# Patient Record
Sex: MALE | Race: WHITE | NOT HISPANIC OR LATINO | Employment: OTHER | URBAN - METROPOLITAN AREA
[De-identification: names, ages, dates, MRNs, and addresses within clinical notes are randomized per-mention and may not be internally consistent; named-entity substitution may affect disease eponyms.]

---

## 2018-04-03 ENCOUNTER — HOSPITAL ENCOUNTER (OUTPATIENT)
Dept: LAB | Facility: MEDICAL CENTER | Age: 78
End: 2018-04-03
Attending: OBSTETRICS & GYNECOLOGY
Payer: MEDICARE

## 2018-04-03 LAB
ALP SERPL-CCNC: 61 U/L (ref 30–99)
ALT SERPL-CCNC: 14 U/L (ref 2–50)
AST SERPL-CCNC: 16 U/L (ref 12–45)
VALPROATE SERPL-MCNC: 72.6 UG/ML (ref 50–100)

## 2018-04-03 PROCEDURE — 84075 ASSAY ALKALINE PHOSPHATASE: CPT

## 2018-04-03 PROCEDURE — 84460 ALANINE AMINO (ALT) (SGPT): CPT

## 2018-04-03 PROCEDURE — 84450 TRANSFERASE (AST) (SGOT): CPT

## 2018-04-03 PROCEDURE — 36415 COLL VENOUS BLD VENIPUNCTURE: CPT

## 2018-04-03 PROCEDURE — 80164 ASSAY DIPROPYLACETIC ACD TOT: CPT

## 2019-01-11 ENCOUNTER — APPOINTMENT (OUTPATIENT)
Dept: PHARMACY | Facility: CLINIC | Age: 79
End: 2019-01-11
Payer: SELF-PAY

## 2019-01-11 ENCOUNTER — APPOINTMENT (OUTPATIENT)
Dept: OTOLARYNGOLOGY | Facility: CLINIC | Age: 79
End: 2019-01-11
Payer: MEDICARE

## 2019-01-11 VITALS — HEIGHT: 70 IN | BODY MASS INDEX: 25.77 KG/M2 | WEIGHT: 180 LBS

## 2019-01-11 VITALS
TEMPERATURE: 97.2 F | HEART RATE: 60 BPM | SYSTOLIC BLOOD PRESSURE: 105 MMHG | DIASTOLIC BLOOD PRESSURE: 61 MMHG | OXYGEN SATURATION: 99 % | RESPIRATION RATE: 20 BRPM

## 2019-01-11 DIAGNOSIS — Z82.2 FAMILY HISTORY OF DEAFNESS AND HEARING LOSS: ICD-10-CM

## 2019-01-11 DIAGNOSIS — H90.42 SENSORINEURAL HEARING LOSS, UNILATERAL, LEFT EAR, WITH UNRESTRICTED HEARING ON THE CONTRALATERAL SIDE: ICD-10-CM

## 2019-01-11 DIAGNOSIS — Z87.891 PERSONAL HISTORY OF NICOTINE DEPENDENCE: ICD-10-CM

## 2019-01-11 DIAGNOSIS — Z86.69 PERSONAL HISTORY OF OTHER DISEASES OF THE NERVOUS SYSTEM AND SENSE ORGANS: ICD-10-CM

## 2019-01-11 DIAGNOSIS — H93.90 UNSPECIFIED DISORDER OF EAR, UNSPECIFIED EAR: ICD-10-CM

## 2019-01-11 PROCEDURE — 92550 TYMPANOMETRY & REFLEX THRESH: CPT

## 2019-01-11 PROCEDURE — 92571 FILTERED SPEECH TEST: CPT

## 2019-01-11 PROCEDURE — 99203 OFFICE O/P NEW LOW 30 MIN: CPT

## 2019-01-11 PROCEDURE — V5299A: CUSTOM | Mod: NC

## 2019-01-11 PROCEDURE — 92557 COMPREHENSIVE HEARING TEST: CPT

## 2019-01-11 RX ORDER — DIVALPROEX SODIUM 125 MG/1
CAPSULE, COATED PELLETS ORAL
Refills: 0 | Status: ACTIVE | COMMUNITY

## 2019-01-11 RX ORDER — FINASTERIDE 5 MG/1
5 TABLET, FILM COATED ORAL
Refills: 0 | Status: ACTIVE | COMMUNITY

## 2019-01-11 RX ORDER — SILODOSIN 8 MG/1
8 CAPSULE ORAL
Refills: 0 | Status: ACTIVE | COMMUNITY

## 2019-01-11 NOTE — HISTORY OF PRESENT ILLNESS
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 He stated that his wife has requested to see whether amplification or something else will improve his hearing as he has had progressive difficulty. he feels his hl is severe. He denies tinnitus or vertigo. he denies noise exposure.

## 2019-02-08 ENCOUNTER — HOSPITAL ENCOUNTER (OUTPATIENT)
Dept: LAB | Facility: MEDICAL CENTER | Age: 79
End: 2019-02-08
Attending: PSYCHIATRY & NEUROLOGY
Payer: MEDICARE

## 2019-02-08 LAB
ALP SERPL-CCNC: 61 U/L (ref 30–99)
ALT SERPL-CCNC: 14 U/L (ref 2–50)
AST SERPL-CCNC: 16 U/L (ref 12–45)
PSA SERPL-MCNC: 6 NG/ML (ref 0–4)
VALPROATE SERPL-MCNC: 92.9 UG/ML (ref 50–100)

## 2019-02-08 PROCEDURE — 84153 ASSAY OF PSA TOTAL: CPT | Mod: GA

## 2019-02-08 PROCEDURE — 84075 ASSAY ALKALINE PHOSPHATASE: CPT

## 2019-02-08 PROCEDURE — 80164 ASSAY DIPROPYLACETIC ACD TOT: CPT

## 2019-02-08 PROCEDURE — 84460 ALANINE AMINO (ALT) (SGPT): CPT

## 2019-02-08 PROCEDURE — 36415 COLL VENOUS BLD VENIPUNCTURE: CPT | Mod: GA

## 2019-02-08 PROCEDURE — 84450 TRANSFERASE (AST) (SGOT): CPT

## 2019-08-19 ENCOUNTER — HOSPITAL ENCOUNTER (EMERGENCY)
Facility: MEDICAL CENTER | Age: 79
End: 2019-08-19
Attending: EMERGENCY MEDICINE
Payer: MEDICARE

## 2019-08-19 VITALS
TEMPERATURE: 98.1 F | DIASTOLIC BLOOD PRESSURE: 74 MMHG | OXYGEN SATURATION: 95 % | HEART RATE: 74 BPM | BODY MASS INDEX: 25.75 KG/M2 | SYSTOLIC BLOOD PRESSURE: 149 MMHG | WEIGHT: 179.9 LBS | HEIGHT: 70 IN | RESPIRATION RATE: 16 BRPM

## 2019-08-19 DIAGNOSIS — R11.2 NAUSEA AND VOMITING, INTRACTABILITY OF VOMITING NOT SPECIFIED, UNSPECIFIED VOMITING TYPE: ICD-10-CM

## 2019-08-19 DIAGNOSIS — R19.7 DIARRHEA, UNSPECIFIED TYPE: ICD-10-CM

## 2019-08-19 LAB
ALBUMIN SERPL BCP-MCNC: 4.4 G/DL (ref 3.2–4.9)
ALBUMIN/GLOB SERPL: 1.8 G/DL
ALP SERPL-CCNC: 59 U/L (ref 30–99)
ALT SERPL-CCNC: 10 U/L (ref 2–50)
ANION GAP SERPL CALC-SCNC: 13 MMOL/L (ref 0–11.9)
AST SERPL-CCNC: 16 U/L (ref 12–45)
BASOPHILS # BLD AUTO: 0.1 % (ref 0–1.8)
BASOPHILS # BLD: 0.01 K/UL (ref 0–0.12)
BILIRUB SERPL-MCNC: 0.7 MG/DL (ref 0.1–1.5)
BUN SERPL-MCNC: 13 MG/DL (ref 8–22)
CALCIUM SERPL-MCNC: 9.1 MG/DL (ref 8.4–10.2)
CHLORIDE SERPL-SCNC: 98 MMOL/L (ref 96–112)
CO2 SERPL-SCNC: 22 MMOL/L (ref 20–33)
CREAT SERPL-MCNC: 1.01 MG/DL (ref 0.5–1.4)
EOSINOPHIL # BLD AUTO: 0 K/UL (ref 0–0.51)
EOSINOPHIL NFR BLD: 0 % (ref 0–6.9)
ERYTHROCYTE [DISTWIDTH] IN BLOOD BY AUTOMATED COUNT: 41.2 FL (ref 35.9–50)
GLOBULIN SER CALC-MCNC: 2.5 G/DL (ref 1.9–3.5)
GLUCOSE SERPL-MCNC: 102 MG/DL (ref 65–99)
HCT VFR BLD AUTO: 42.1 % (ref 42–52)
HGB BLD-MCNC: 14.6 G/DL (ref 14–18)
IMM GRANULOCYTES # BLD AUTO: 0.03 K/UL (ref 0–0.11)
IMM GRANULOCYTES NFR BLD AUTO: 0.4 % (ref 0–0.9)
LACTATE BLD-SCNC: 2.2 MMOL/L (ref 0.5–2)
LIPASE SERPL-CCNC: 21 U/L (ref 7–58)
LYMPHOCYTES # BLD AUTO: 0.34 K/UL (ref 1–4.8)
LYMPHOCYTES NFR BLD: 5 % (ref 22–41)
MCH RBC QN AUTO: 31.3 PG (ref 27–33)
MCHC RBC AUTO-ENTMCNC: 34.7 G/DL (ref 33.7–35.3)
MCV RBC AUTO: 90.1 FL (ref 81.4–97.8)
MONOCYTES # BLD AUTO: 0.88 K/UL (ref 0–0.85)
MONOCYTES NFR BLD AUTO: 12.9 % (ref 0–13.4)
NEUTROPHILS # BLD AUTO: 5.55 K/UL (ref 1.82–7.42)
NEUTROPHILS NFR BLD: 81.6 % (ref 44–72)
NRBC # BLD AUTO: 0 K/UL
NRBC BLD-RTO: 0 /100 WBC
PLATELET # BLD AUTO: 247 K/UL (ref 164–446)
PMV BLD AUTO: 9.9 FL (ref 9–12.9)
POTASSIUM SERPL-SCNC: 3.8 MMOL/L (ref 3.6–5.5)
PROT SERPL-MCNC: 6.9 G/DL (ref 6–8.2)
RBC # BLD AUTO: 4.67 M/UL (ref 4.7–6.1)
SODIUM SERPL-SCNC: 133 MMOL/L (ref 135–145)
WBC # BLD AUTO: 6.8 K/UL (ref 4.8–10.8)

## 2019-08-19 PROCEDURE — 80053 COMPREHEN METABOLIC PANEL: CPT

## 2019-08-19 PROCEDURE — 83690 ASSAY OF LIPASE: CPT

## 2019-08-19 PROCEDURE — 99284 EMERGENCY DEPT VISIT MOD MDM: CPT

## 2019-08-19 PROCEDURE — 700105 HCHG RX REV CODE 258: Performed by: EMERGENCY MEDICINE

## 2019-08-19 PROCEDURE — 36415 COLL VENOUS BLD VENIPUNCTURE: CPT

## 2019-08-19 PROCEDURE — 83605 ASSAY OF LACTIC ACID: CPT

## 2019-08-19 PROCEDURE — 85025 COMPLETE CBC W/AUTO DIFF WBC: CPT

## 2019-08-19 RX ORDER — DIVALPROEX SODIUM 500 MG/1
1000 TABLET, EXTENDED RELEASE ORAL EVERY MORNING
Status: SHIPPED | COMMUNITY
End: 2021-04-30

## 2019-08-19 RX ORDER — ONDANSETRON 4 MG/1
4 TABLET, ORALLY DISINTEGRATING ORAL EVERY 8 HOURS PRN
Qty: 20 TAB | Refills: 0 | Status: SHIPPED | OUTPATIENT
Start: 2019-08-19 | End: 2019-11-07

## 2019-08-19 RX ORDER — SODIUM CHLORIDE 9 MG/ML
1000 INJECTION, SOLUTION INTRAVENOUS ONCE
Status: COMPLETED | OUTPATIENT
Start: 2019-08-19 | End: 2019-08-19

## 2019-08-19 RX ADMIN — SODIUM CHLORIDE 1000 ML: 9 INJECTION, SOLUTION INTRAVENOUS at 16:42

## 2019-08-19 ASSESSMENT — LIFESTYLE VARIABLES: DO YOU DRINK ALCOHOL: NO

## 2019-08-19 NOTE — ED NOTES
1518:  Assisted w/ pt care.  PIV placed in RAC, blood drawn and sent to lab.  Pt repositioned on gurney, provided w/ warm blankets.  Pt denies c/o pain at this time.

## 2019-08-19 NOTE — ED TRIAGE NOTES
Pt ambulates to triage  Chief Complaint   Patient presents with   • Diarrhea   • N/V   • Abdominal Pain   started yesterday   Pt also c/o dizziness  X 10 episodes of diarrhea, denies black or red in emesis and stool  Pt A & 0 x 4, speech clear, ambulates well  Pt asked to wait in lobby, pt updated on triage process and pt asked to inform RN of any changes.

## 2019-08-19 NOTE — ED PROVIDER NOTES
"ED Provider Note    CHIEF COMPLAINT  Chief Complaint   Patient presents with   • Diarrhea   • N/V   • Abdominal Pain       HPI  Laurent Infante is a 79 y.o. male who presents with a history of vomiting and diarrhea since 1 AM.  He describes some crampy abdominal pain.  The patient says that he lives with his wife and children and none of them have the same symptoms.  Patient also describes some dizziness.  The patient has had no travel out of the [place]. The patient has not been camping drinking out of streams. The patient has had no recent antibiotic use. The patient denies any blood or black in their vomit or stool.      REVIEW OF SYSTEMS  See HPI for further details. All other systems are negative.     PAST MEDICAL HISTORY   The patient denies    SOCIAL HISTORY  Social History     Tobacco Use   • Smoking status: Never Smoker   • Smokeless tobacco: Never Used   Substance and Sexual Activity   • Alcohol use: Yes   • Drug use: Never   • Sexual activity: Not on file       SURGICAL HISTORY   has a past surgical history that includes shoulder surgery and hernia repair.    CURRENT MEDICATIONS  Home Medications     Reviewed by Chris Rouse (Pharmacy Tech) on 08/19/19 at 1611  Med List Status: Complete   Medication Last Dose Status   divalproex ER (DEPAKOTE ER) 500 MG TABLET SR 24 HR 8/19/2019 Active                ALLERGIES  No Known Allergies    PHYSICAL EXAM  VITAL SIGNS: /69   Pulse 70   Temp 36.7 °C (98.1 °F) (Temporal)   Resp 16   Ht 1.778 m (5' 10\")   Wt 81.6 kg (179 lb 14.3 oz)   SpO2 99%   BMI 25.81 kg/m²  @MOHAMUD[[id number]::@   Pulse ox interpretation: I interpret this pulse ox as normal.  Constitutional: Alert, appears dehydrated with dry mucous membranes.  The patient appears generally weak.  HENT: No signs of trauma, Bilateral external ears normal, Nose normal.   Eyes: Pupils are equal and reactive, Conjunctiva normal, Non-icteric.   Neck: Normal range of motion, No tenderness, Supple, " No stridor.   Lymphatic: No lymphadenopathy noted.   Cardiovascular: Regular rate and rhythm, no murmurs.   Thorax & Lungs: Normal breath sounds, No respiratory distress, No wheezing, No chest tenderness.   Abdomen: Bowel sounds normal, Soft, No tenderness, No masses, No pulsatile masses. No peritoneal signs.  Skin: Warm, Dry, No erythema, No rash.   Extremities: Intact distal pulses, No edema, No tenderness, No cyanosis.  Musculoskeletal: Good range of motion in all major joints. No tenderness to palpation or major deformities noted.   Neurologic: Alert , Normal motor function, Normal sensory function, No focal deficits noted.   Psychiatric: Affect normal, Judgment normal, Mood normal.       DIAGNOSTIC STUDIES / PROCEDURES        LABS  Labs Reviewed   CBC WITH DIFFERENTIAL - Abnormal; Notable for the following components:       Result Value    RBC 4.67 (*)     Neutrophils-Polys 81.60 (*)     Lymphocytes 5.00 (*)     Lymphs (Absolute) 0.34 (*)     Monos (Absolute) 0.88 (*)     All other components within normal limits   COMP METABOLIC PANEL - Abnormal; Notable for the following components:    Sodium 133 (*)     Anion Gap 13.0 (*)     Glucose 102 (*)     All other components within normal limits   LACTIC ACID - Abnormal; Notable for the following components:    Lactic Acid 2.2 (*)     All other components within normal limits   LIPASE   ESTIMATED GFR   URINALYSIS,CULTURE IF INDICATED               COURSE & MEDICAL DECISION MAKING  Pertinent Labs & Imaging studies reviewed. (See chart for details)    Differential diagnosis: Dehydration, gastroenteritis    The patient clinically appears dehydrated and therefore was given 1 L normal saline IV.    Patient was checked after 1 L normal saline IV and he feels much better and he appears improved.    The patient be discharged with a prescription for Zofran, he will take Imodium over-the-counter as needed for diarrhea.    The patient will return for new or worsening symptoms  and is stable at the time of discharge.    The patient is referred to a primary physician for blood pressure management, diabetic screening, and for all other preventative health concerns.        DISPOSITION:  Patient will be discharged home in stable condition.    FOLLOW UP:  [facility], Emergency Dept  [address]  [place] [address]  [phone]    If symptoms worsen          see your doctor for follow up as needed      OUTPATIENT MEDICATIONS:  New Prescriptions    ONDANSETRON (ZOFRAN ODT) 4 MG TABLET DISPERSIBLE    Take 1 Tab by mouth every 8 hours as needed.         The patient will not drink alcohol nor drive with prescribed medications. The patient will return for worsening symptoms and is stable at the time of discharge. The patient verbalizes understanding and will comply.    FINAL IMPRESSION  1. Nausea and vomiting, intractability of vomiting not specified, unspecified vomiting type     2. Diarrhea, unspecified type                Electronically signed by: Sascha Gregory, 8/19/2019 4:29 PM

## 2019-08-20 ENCOUNTER — HOSPITAL ENCOUNTER (EMERGENCY)
Facility: MEDICAL CENTER | Age: 79
End: 2019-08-20
Payer: MEDICARE

## 2019-08-20 VITALS
DIASTOLIC BLOOD PRESSURE: 69 MMHG | SYSTOLIC BLOOD PRESSURE: 128 MMHG | HEART RATE: 74 BPM | OXYGEN SATURATION: 97 % | RESPIRATION RATE: 18 BRPM | BODY MASS INDEX: 25.75 KG/M2 | TEMPERATURE: 97.8 F | HEIGHT: 70 IN | WEIGHT: 179.9 LBS

## 2019-08-20 PROCEDURE — 302449 STATCHG TRIAGE ONLY (STATISTIC)

## 2019-08-20 NOTE — DISCHARGE INSTRUCTIONS
Food Choices to Help Relieve Diarrhea, Adult  When you have diarrhea, the foods you eat and your eating habits are very important. Choosing the right foods and drinks can help relieve diarrhea. Also, because diarrhea can last up to 7 days, you need to replace lost fluids and electrolytes (such as sodium, potassium, and chloride) in order to help prevent dehydration.   WHAT GENERAL GUIDELINES DO I NEED TO FOLLOW?  · Slowly drink 1 cup (8 oz) of fluid for each episode of diarrhea. If you are getting enough fluid, your urine will be clear or pale yellow.  · Eat starchy foods. Some good choices include white rice, white toast, pasta, low-fiber cereal, baked potatoes (without the skin), saltine crackers, and bagels.  · Avoid large servings of any cooked vegetables.  · Limit fruit to two servings per day. A serving is ½ cup or 1 small piece.  · Choose foods with less than 2 g of fiber per serving.  · Limit fats to less than 8 tsp (38 g) per day.  · Avoid fried foods.  · Eat foods that have probiotics in them. Probiotics can be found in certain dairy products.  · Avoid foods and beverages that may increase the speed at which food moves through the stomach and intestines (gastrointestinal tract). Things to avoid include:  ¨ High-fiber foods, such as dried fruit, raw fruits and vegetables, nuts, seeds, and whole grain foods.  ¨ Spicy foods and high-fat foods.  ¨ Foods and beverages sweetened with high-fructose corn syrup, honey, or sugar alcohols such as xylitol, sorbitol, and mannitol.  WHAT FOODS ARE RECOMMENDED?  Grains  White rice. White, Bulgarian, or nakul breads (fresh or toasted), including plain rolls, buns, or bagels. White pasta. Saltine, soda, or adrian crackers. Pretzels. Low-fiber cereal. Cooked cereals made with water (such as cornmeal, farina, or cream cereals). Plain muffins. Matzo. [place]axa toast. Zwieback.   Vegetables  Potatoes (without the skin). Strained tomato and vegetable juices. Most well-cooked and canned  vegetables without seeds. Tender lettuce.  Fruits  Cooked or canned applesauce, apricots, cherries, fruit cocktail, grapefruit, peaches, pears, or plums. Fresh bananas, apples without skin, cherries, grapes, cantaloupe, grapefruit, peaches, oranges, or plums.   Meat and Other Protein Products  Baked or boiled chicken. Eggs. Tofu. Fish. Seafood. Smooth peanut butter. Ground or well-cooked tender beef, ham, veal, lamb, pork, or poultry.   Dairy  Plain yogurt, kefir, and unsweetened liquid yogurt. Lactose-free milk, buttermilk, or soy milk. Plain hard cheese.  Beverages  Sport drinks. Clear broths. Diluted fruit juices (except prune). Regular, caffeine-free sodas such as ginger ale. Water. Decaffeinated teas. Oral rehydration solutions. Sugar-free beverages not sweetened with sugar alcohols.  Other  Bouillon, broth, or soups made from recommended foods.   The items listed above may not be a complete list of recommended foods or beverages. Contact your dietitian for more options.  WHAT FOODS ARE NOT RECOMMENDED?  Grains  Whole grain, whole wheat, bran, or rye breads, rolls, pastas, crackers, and cereals. Wild or brown rice. Cereals that contain more than 2 g of fiber per serving. Corn tortillas or taco shells. Cooked or dry oatmeal. Granola. Popcorn.  Vegetables  Raw vegetables. Cabbage, broccoli, [place] sprouts, artichokes, baked beans, beet greens, corn, kale, legumes, peas, sweet potatoes, and yams. Potato skins. Cooked spinach and cabbage.  Fruits  Dried fruit, including raisins and dates. Raw fruits. Stewed or dried prunes. Fresh apples with skin, apricots, mangoes, pears, raspberries, and strawberries.   Meat and Other Protein Products  [place] peanut butter. Nuts and seeds. Beans and lentils. Field.   Dairy  High-fat cheeses. Milk, chocolate milk, and beverages made with milk, such as milk shakes. Cream. Ice cream.  Sweets and Desserts  Sweet rolls, doughnuts, and sweet breads. Pancakes and waffles.  Fats and  Oils  Butter. Cream sauces. Margarine. Salad oils. Plain salad dressings. Olives. Avocados.   Beverages  Caffeinated beverages (such as coffee, tea, soda, or energy drinks). Alcoholic beverages. Fruit juices with pulp. Prune juice. Soft drinks sweetened with high-fructose corn syrup or sugar alcohols.  Other  Coconut. Hot sauce. Chili powder. Mayonnaise. Gravy. Cream-based or milk-based soups.   The items listed above may not be a complete list of foods and beverages to avoid. Contact your dietitian for more information.  WHAT SHOULD I DO IF I BECOME DEHYDRATED?  Diarrhea can sometimes lead to dehydration. Signs of dehydration include dark urine and dry mouth and skin. If you think you are dehydrated, you should rehydrate with an oral rehydration solution. These solutions can be purchased at pharmacies, retail stores, or online.   Drink ½-1 cup (120-240 mL) of oral rehydration solution each time you have an episode of diarrhea. If drinking this amount makes your diarrhea worse, try drinking smaller amounts more often. For example, drink 1-3 tsp (5-15 mL) every 5-10 minutes.   A general rule for staying hydrated is to drink 1½-2 L of fluid per day. Talk to your health care provider about the specific amount you should be drinking each day. Drink enough fluids to keep your urine clear or pale yellow.     This information is not intended to replace advice given to you by your health care provider. Make sure you discuss any questions you have with your health care provider.     Document Released: 03/09/2005 Document Revised: 01/08/2016 Document Reviewed: 11/10/2014  [employer] Interactive Patient Education ©2016 [employer]

## 2019-08-20 NOTE — ED NOTES
Patient verbalized understanding of plan of care and discharge information. Patient in stable condition. No signs of distress. Patient pain free. Patient ambulatory out of ED to personal vehicle with stable gait with family.

## 2019-08-21 NOTE — PROGRESS NOTES
Family came to triage and informed my that she went to the store and picked him up immodium and they were going to [facility]. They would return if his symptoms didn't improve. I explained the risks and gave him strict return instructions. He left in good condition in the care of his friend.

## 2019-08-21 NOTE — ED TRIAGE NOTES
Patient c/o diarrhea and abdominal cramping. Patient was seen here yesterday for the same. He was supposed to take zofran and immodium, but wasn't able to take immodium.

## 2019-11-07 ENCOUNTER — OFFICE VISIT (OUTPATIENT)
Dept: MEDICAL GROUP | Facility: MEDICAL CENTER | Age: 79
End: 2019-11-07
Payer: MEDICARE

## 2019-11-07 VITALS
BODY MASS INDEX: 25.88 KG/M2 | HEART RATE: 64 BPM | OXYGEN SATURATION: 96 % | WEIGHT: 180.78 LBS | HEIGHT: 70 IN | DIASTOLIC BLOOD PRESSURE: 62 MMHG | TEMPERATURE: 97.9 F | SYSTOLIC BLOOD PRESSURE: 124 MMHG

## 2019-11-07 DIAGNOSIS — Z23 NEED FOR VACCINATION: ICD-10-CM

## 2019-11-07 DIAGNOSIS — Z13.220 LIPID SCREENING: ICD-10-CM

## 2019-11-07 DIAGNOSIS — F31.62 BIPOLAR DISORDER, CURRENT EPISODE MIXED, MODERATE (HCC): ICD-10-CM

## 2019-11-07 DIAGNOSIS — R05.9 COUGH: ICD-10-CM

## 2019-11-07 DIAGNOSIS — R25.2 LEG CRAMPS: ICD-10-CM

## 2019-11-07 PROBLEM — F33.0 MILD EPISODE OF RECURRENT MAJOR DEPRESSIVE DISORDER (HCC): Status: ACTIVE | Noted: 2019-11-07

## 2019-11-07 PROCEDURE — G0008 ADMIN INFLUENZA VIRUS VAC: HCPCS | Performed by: FAMILY MEDICINE

## 2019-11-07 PROCEDURE — 90670 PCV13 VACCINE IM: CPT | Performed by: FAMILY MEDICINE

## 2019-11-07 PROCEDURE — 99204 OFFICE O/P NEW MOD 45 MIN: CPT | Mod: 25 | Performed by: FAMILY MEDICINE

## 2019-11-07 PROCEDURE — 90662 IIV NO PRSV INCREASED AG IM: CPT | Performed by: FAMILY MEDICINE

## 2019-11-07 PROCEDURE — G0009 ADMIN PNEUMOCOCCAL VACCINE: HCPCS | Performed by: FAMILY MEDICINE

## 2019-11-07 RX ORDER — FLUTICASONE PROPIONATE 50 MCG
1 SPRAY, SUSPENSION (ML) NASAL DAILY
Qty: 16 G | Refills: 2 | Status: SHIPPED | OUTPATIENT
Start: 2019-11-07 | End: 2019-12-04 | Stop reason: SDUPTHER

## 2019-11-07 ASSESSMENT — PATIENT HEALTH QUESTIONNAIRE - PHQ9
CLINICAL INTERPRETATION OF PHQ2 SCORE: 6
SUM OF ALL RESPONSES TO PHQ QUESTIONS 1-9: 14
5. POOR APPETITE OR OVEREATING: 0 - NOT AT ALL

## 2019-11-07 NOTE — ASSESSMENT & PLAN NOTE
Has been on depakote for the past 20 yrs. He has tried to stop the medication in the past with very poor   Mood is generally stable, he states his mood has been poorer   He is not intersted in trying another antidepressants.

## 2019-11-07 NOTE — PROGRESS NOTES
Subjective:     CC: Diagnoses of Bipolar disorder, current episode mixed, moderate (HCC), Leg cramps, Cough, Lipid screening, and Need for vaccination were pertinent to this visit.    HPI: Patient is a 79 y.o. male established patient who presents today to [facility] care.       Bipolar disorder, current episode mixed, moderate (HCC)  Chronic problem. Has been on depakote for the past 20 yrs. He has tried to stop the medication in the past with very poor results.  Mood is generally stable, however, he states his mood has been poorer lately due to financial problems.  He is not intersted in trying another antidepressant.  He also states he is not interested in seeing a therapist.  He does report suicidal ideation, however, states he would never follow through with this due to his wife and dogs.    Cough  New problem. Has had a chronic cough for the past year or so. The cough is worst at night.   Denies any SOB.   The cough is productive, coughs up phlegm.   No fevers or chills.   Denies any symptoms of reflux.   Does report some sinus congestion.   Has not tried any OTC allergy meds.     Leg cramps  New problem. Having cramps in the calves and thighs while sleeping. He took pedialyte after a GI illness which seems to have improved the cramping.       History reviewed. No pertinent past medical history.    Social History     Tobacco Use   • Smoking status: Never Smoker   • Smokeless tobacco: Never Used   Substance Use Topics   • Alcohol use: Yes     Comment: occasionally   • Drug use: Never       Current Outpatient Medications Ordered in Epic   Medication Sig Dispense Refill   • fluticasone (FLONASE) 50 MCG/ACT nasal spray Spray 1 Spray in nose every day. 16 g 2   • divalproex ER (DEPAKOTE ER) 500 MG TABLET SR 24 HR Take 1,000 mg by mouth every morning.       No current [facility]-ordered facility-administered medications on file.        Allergies:  Patient has no known allergies.    Health Maintenance:  "Completed    ROS:  Gen: no fevers/chill, no changes in weight  Eyes: no changes in vision  ENT: no sore throat, no hearing loss, no bloody nose  Pulm: no sob, no cough  CV: no chest pain, no palpitations  GI: no nausea/vomiting, no diarrhea  : no dysuria  MSk: no myalgias  Skin: no rash  Neuro: no headaches, no numbness/tingling  Heme/Lymph: no easy bruising      Objective:       Exam:  /62 (BP Location: Left arm, Patient Position: Sitting, BP Cuff Size: Adult)   Pulse 64   Temp 36.6 °C (97.9 °F) (Temporal)   Ht 1.778 m (5' 10\")   Wt 82 kg (180 lb 12.4 oz)   SpO2 96%   BMI 25.94 kg/m²  Body mass index is 25.94 kg/m².      General: Normal appearing. No distress.  HEAD: NCAT  EYES: conjunctiva clear, lids without ptosis, pupils equal  and reactive to light  EARS: ears normal shape and contour, bilateral hearing aids in place.  MOUTH: oropharynx is without erythema, edema or exudates.   Neck: Supple without masses. Thyroid is not enlarged. Normal ROM  Pulmonary: Clear to ausculation.  Normal effort. No rales, ronchi, or wheezing.  Cardiovascular: Regular rate and rhythm, no murmur.  Bilateral 1+ LE edema  Neurologic: Grossly normal, no focal deficits  Lymph: No cervical or supraclavicular lymph nodes are palpable  Skin: Warm and dry.  No obvious lesions.  Musculoskeletal: Normal gait and station.   Psych: Normal mood and affect. Alert and oriented x3. Judgment and insight is normal.      Assessment & Plan:     79 y.o. male with the following -     1. Bipolar disorder, current episode mixed, moderate (HCC)  Chronic problem, new to discuss.  Patient does report more depression than usual with some suicidal ideation, however, states he would never follow through with this.  Currently on Depakote, declines referral to psychiatry or psychology.  Declines the addition of any further medications.  I did order the following labs.  Plan to follow-up in 1 month.  - TSH WITH REFLEX TO FT4; Future  - CBC WITH " DIFFERENTIAL; Future    2. Leg cramps  New problem.  Ordered the following labs.  Cramping has improved since drinking Pedialyte.  - Comp Metabolic Panel; Future    3. Cough  New problem.  Most likely due to postnasal drainage.  Prescription given for Flonase, order placed for chest x-ray.  Plan to follow-up in 1 month.  - fluticasone (FLONASE) 50 MCG/ACT nasal spray; Spray 1 Spray in nose every day.  Dispense: 16 g; Refill: 2  - DX-CHEST-2 VIEWS; Future    4. Lipid screening  - Lipid Profile; Future    5. Need for vaccination  - Influenza Vaccine, High Dose (65+ Only)  - Prevnar-13 Vaccine (PCV13)      Return in about 4 weeks (around 12/5/2019).    Please note that this dictation was created using voice recognition software. I have made every reasonable attempt to correct obvious errors, but I expect that there are errors of grammar and possibly content that I did not discover before finalizing the note.

## 2019-11-07 NOTE — ASSESSMENT & PLAN NOTE
New problem. Has had a chronic cough for the past year or so. The cough is worst at night.   Denies any SOB.   The cough is productive, coughs up phlegm.   No fevers or chills.   Denies any symptoms of reflux.   Does report some sinus congestion.   Has not tried any OTC allergy meds.

## 2019-11-07 NOTE — ASSESSMENT & PLAN NOTE
New problem. Having cramps in the calves and thighs while sleeping. He took pedialyte after a GI illness which seems to have improved the cramping.

## 2019-11-11 ENCOUNTER — HOSPITAL ENCOUNTER (OUTPATIENT)
Dept: RADIOLOGY | Facility: MEDICAL CENTER | Age: 79
End: 2019-11-11
Attending: FAMILY MEDICINE
Payer: MEDICARE

## 2019-11-11 ENCOUNTER — HOSPITAL ENCOUNTER (OUTPATIENT)
Dept: LAB | Facility: MEDICAL CENTER | Age: 79
End: 2019-11-11
Attending: FAMILY MEDICINE
Payer: MEDICARE

## 2019-11-11 DIAGNOSIS — F31.62 BIPOLAR DISORDER, CURRENT EPISODE MIXED, MODERATE (HCC): ICD-10-CM

## 2019-11-11 DIAGNOSIS — Z13.220 LIPID SCREENING: ICD-10-CM

## 2019-11-11 DIAGNOSIS — R05.9 COUGH: ICD-10-CM

## 2019-11-11 DIAGNOSIS — R25.2 LEG CRAMPS: ICD-10-CM

## 2019-11-11 LAB
ALBUMIN SERPL BCP-MCNC: 3.9 G/DL (ref 3.2–4.9)
ALBUMIN/GLOB SERPL: 1.7 G/DL
ALP SERPL-CCNC: 50 U/L (ref 30–99)
ALT SERPL-CCNC: 12 U/L (ref 2–50)
ANION GAP SERPL CALC-SCNC: 7 MMOL/L (ref 0–11.9)
AST SERPL-CCNC: 13 U/L (ref 12–45)
BASOPHILS # BLD AUTO: 0.4 % (ref 0–1.8)
BASOPHILS # BLD: 0.02 K/UL (ref 0–0.12)
BILIRUB SERPL-MCNC: 0.5 MG/DL (ref 0.1–1.5)
BUN SERPL-MCNC: 17 MG/DL (ref 8–22)
CALCIUM SERPL-MCNC: 9 MG/DL (ref 8.5–10.5)
CHLORIDE SERPL-SCNC: 107 MMOL/L (ref 96–112)
CHOLEST SERPL-MCNC: 161 MG/DL (ref 100–199)
CO2 SERPL-SCNC: 26 MMOL/L (ref 20–33)
CREAT SERPL-MCNC: 0.9 MG/DL (ref 0.5–1.4)
EOSINOPHIL # BLD AUTO: 0.06 K/UL (ref 0–0.51)
EOSINOPHIL NFR BLD: 1.1 % (ref 0–6.9)
ERYTHROCYTE [DISTWIDTH] IN BLOOD BY AUTOMATED COUNT: 42.4 FL (ref 35.9–50)
FASTING STATUS PATIENT QL REPORTED: NORMAL
GLOBULIN SER CALC-MCNC: 2.3 G/DL (ref 1.9–3.5)
GLUCOSE SERPL-MCNC: 89 MG/DL (ref 65–99)
HCT VFR BLD AUTO: 41.1 % (ref 42–52)
HDLC SERPL-MCNC: 57 MG/DL
HGB BLD-MCNC: 13.6 G/DL (ref 14–18)
IMM GRANULOCYTES # BLD AUTO: 0.01 K/UL (ref 0–0.11)
IMM GRANULOCYTES NFR BLD AUTO: 0.2 % (ref 0–0.9)
LDLC SERPL CALC-MCNC: 91 MG/DL
LYMPHOCYTES # BLD AUTO: 1.67 K/UL (ref 1–4.8)
LYMPHOCYTES NFR BLD: 30.1 % (ref 22–41)
MCH RBC QN AUTO: 30.9 PG (ref 27–33)
MCHC RBC AUTO-ENTMCNC: 33.1 G/DL (ref 33.7–35.3)
MCV RBC AUTO: 93.4 FL (ref 81.4–97.8)
MONOCYTES # BLD AUTO: 0.83 K/UL (ref 0–0.85)
MONOCYTES NFR BLD AUTO: 15 % (ref 0–13.4)
NEUTROPHILS # BLD AUTO: 2.95 K/UL (ref 1.82–7.42)
NEUTROPHILS NFR BLD: 53.2 % (ref 44–72)
NRBC # BLD AUTO: 0 K/UL
NRBC BLD-RTO: 0 /100 WBC
PLATELET # BLD AUTO: 261 K/UL (ref 164–446)
PMV BLD AUTO: 9.5 FL (ref 9–12.9)
POTASSIUM SERPL-SCNC: 4 MMOL/L (ref 3.6–5.5)
PROT SERPL-MCNC: 6.2 G/DL (ref 6–8.2)
RBC # BLD AUTO: 4.4 M/UL (ref 4.7–6.1)
SODIUM SERPL-SCNC: 140 MMOL/L (ref 135–145)
TRIGL SERPL-MCNC: 65 MG/DL (ref 0–149)
WBC # BLD AUTO: 5.5 K/UL (ref 4.8–10.8)

## 2019-11-11 PROCEDURE — 71046 X-RAY EXAM CHEST 2 VIEWS: CPT

## 2019-11-11 PROCEDURE — 84443 ASSAY THYROID STIM HORMONE: CPT

## 2019-11-11 PROCEDURE — 36415 COLL VENOUS BLD VENIPUNCTURE: CPT

## 2019-11-11 PROCEDURE — 80061 LIPID PANEL: CPT | Mod: GA

## 2019-11-11 PROCEDURE — 80053 COMPREHEN METABOLIC PANEL: CPT

## 2019-11-11 PROCEDURE — 85025 COMPLETE CBC W/AUTO DIFF WBC: CPT

## 2019-11-12 LAB — TSH SERPL DL<=0.005 MIU/L-ACNC: 2.75 UIU/ML (ref 0.38–5.33)

## 2019-12-04 DIAGNOSIS — R05.9 COUGH: ICD-10-CM

## 2019-12-05 RX ORDER — FLUTICASONE PROPIONATE 50 MCG
1 SPRAY, SUSPENSION (ML) NASAL DAILY
Qty: 1 BOTTLE | Refills: 2 | Status: SHIPPED | OUTPATIENT
Start: 2019-12-05 | End: 2020-03-03

## 2019-12-09 ENCOUNTER — HOSPITAL ENCOUNTER (OUTPATIENT)
Dept: LAB | Facility: MEDICAL CENTER | Age: 79
End: 2019-12-09
Attending: FAMILY MEDICINE
Payer: MEDICARE

## 2019-12-09 ENCOUNTER — OFFICE VISIT (OUTPATIENT)
Dept: MEDICAL GROUP | Facility: MEDICAL CENTER | Age: 79
End: 2019-12-09
Payer: MEDICARE

## 2019-12-09 VITALS
BODY MASS INDEX: 26.69 KG/M2 | SYSTOLIC BLOOD PRESSURE: 128 MMHG | TEMPERATURE: 97.6 F | DIASTOLIC BLOOD PRESSURE: 68 MMHG | HEART RATE: 60 BPM | HEIGHT: 70 IN | WEIGHT: 186.4 LBS | OXYGEN SATURATION: 100 %

## 2019-12-09 DIAGNOSIS — R05.9 COUGH: ICD-10-CM

## 2019-12-09 DIAGNOSIS — F31.62 BIPOLAR DISORDER, CURRENT EPISODE MIXED, MODERATE (HCC): ICD-10-CM

## 2019-12-09 DIAGNOSIS — D64.9 ANEMIA, UNSPECIFIED TYPE: ICD-10-CM

## 2019-12-09 LAB
BASOPHILS # BLD AUTO: 0.3 % (ref 0–1.8)
BASOPHILS # BLD: 0.02 K/UL (ref 0–0.12)
EOSINOPHIL # BLD AUTO: 0.04 K/UL (ref 0–0.51)
EOSINOPHIL NFR BLD: 0.7 % (ref 0–6.9)
ERYTHROCYTE [DISTWIDTH] IN BLOOD BY AUTOMATED COUNT: 42.2 FL (ref 35.9–50)
HCT VFR BLD AUTO: 40.9 % (ref 42–52)
HGB BLD-MCNC: 13.6 G/DL (ref 14–18)
IMM GRANULOCYTES # BLD AUTO: 0.02 K/UL (ref 0–0.11)
IMM GRANULOCYTES NFR BLD AUTO: 0.3 % (ref 0–0.9)
LYMPHOCYTES # BLD AUTO: 2.35 K/UL (ref 1–4.8)
LYMPHOCYTES NFR BLD: 38.4 % (ref 22–41)
MCH RBC QN AUTO: 30.9 PG (ref 27–33)
MCHC RBC AUTO-ENTMCNC: 33.3 G/DL (ref 33.7–35.3)
MCV RBC AUTO: 93 FL (ref 81.4–97.8)
MONOCYTES # BLD AUTO: 0.71 K/UL (ref 0–0.85)
MONOCYTES NFR BLD AUTO: 11.6 % (ref 0–13.4)
NEUTROPHILS # BLD AUTO: 2.98 K/UL (ref 1.82–7.42)
NEUTROPHILS NFR BLD: 48.7 % (ref 44–72)
NRBC # BLD AUTO: 0 K/UL
NRBC BLD-RTO: 0 /100 WBC
PLATELET # BLD AUTO: 261 K/UL (ref 164–446)
PMV BLD AUTO: 9.6 FL (ref 9–12.9)
RBC # BLD AUTO: 4.4 M/UL (ref 4.7–6.1)
VALPROATE SERPL-MCNC: 69.9 UG/ML (ref 50–100)
WBC # BLD AUTO: 6.1 K/UL (ref 4.8–10.8)

## 2019-12-09 PROCEDURE — 99214 OFFICE O/P EST MOD 30 MIN: CPT | Performed by: FAMILY MEDICINE

## 2019-12-09 PROCEDURE — 36415 COLL VENOUS BLD VENIPUNCTURE: CPT

## 2019-12-09 PROCEDURE — 85025 COMPLETE CBC W/AUTO DIFF WBC: CPT

## 2019-12-09 PROCEDURE — 80164 ASSAY DIPROPYLACETIC ACD TOT: CPT

## 2019-12-09 RX ORDER — OMEPRAZOLE 20 MG/1
20 CAPSULE, DELAYED RELEASE ORAL DAILY
Qty: 30 CAP | Refills: 2 | Status: SHIPPED | OUTPATIENT
Start: 2019-12-09 | End: 2020-02-06

## 2019-12-09 NOTE — PROGRESS NOTES
Subjective:     CC: Diagnoses of Cough, Bipolar disorder, current episode mixed, moderate (HCC), and Anemia, unspecified type were pertinent to this visit.    HPI: Patient is a 79 y.o. male established patient who presents today to follow-up.      Bipolar disorder, current episode mixed, moderate (HCC)  Chronic problem.  The patient reports that he continues to feel depressed, denies any plans for suicide.  He reports that his mood is generally stable.  Has been on Depakote for the past 20 years.  Recent CBC did show some mild anemia, TSH was normal.  Metabolic panel was normal.  The patient prefers not to see a psychiatrist or psychologist.    Cough  Chronic problem.  Chest x-ray was normal.  The patient continues to have a chronic cough, worst around and after dinnertime.  No improvement with Flonase.    Anemia  New problem.  Noted on recent CBC.  Hemoglobin is 13.6.  Last CBC was in August with a hemoglobin of 14.5.  However, the patient states that he was quite dehydrated at this time given the fact that he had severe GI bug.  He is unsure if he is ever had anemia in the past.  He does report that he is up-to-date on his colonoscopy, recently done earlier this year.  Showed 1 polyp, was noncancerous.      History reviewed. No pertinent past medical history.    Social History     Tobacco Use   • Smoking status: Never Smoker   • Smokeless tobacco: Never Used   Substance Use Topics   • Alcohol use: Yes     Comment: occasionally   • Drug use: Never       Current Outpatient Medications Ordered in Epic   Medication Sig Dispense Refill   • omeprazole (PRILOSEC) 20 MG delayed-release capsule Take 1 Cap by mouth every day. 30 Cap 2   • fluticasone (FLONASE) 50 MCG/ACT nasal spray SPRAY 1 SPRAY IN NOSE EVERY DAY. 1 Bottle 2   • divalproex ER (DEPAKOTE ER) 500 MG TABLET SR 24 HR Take 1,000 mg by mouth every morning.       No current [facility]-ordered facility-administered medications on file.        Allergies:  Patient has no  "known allergies.    Health Maintenance: Completed    ROS:    Pulm: no sob, no cough  CV: no chest pain, no palpitations      Objective:       Exam:  /68 (BP Location: Left arm, Patient Position: Sitting, BP Cuff Size: Adult long)   Pulse 60   Temp 36.4 °C (97.6 °F) (Temporal)   Ht 1.778 m (5' 10\")   Wt 84.6 kg (186 lb 6.4 oz)   SpO2 100%   BMI 26.75 kg/m²  Body mass index is 26.75 kg/m².    General: Normal appearing. No distress.  HEAD: NCAT  EYES: conjunctiva clear, lids without ptosis, pupils equal and reactive to light  EARS: ears normal shape and contour.  MOUTH: normal dentition   Neck:  Normal ROM  Pulmonary: Normal effort. Normal respiratory rate.  Cardiovascular: Well perfused. No LE edema  Neurologic: Grossly normal, no focal deficits  Skin: Warm and dry.  No obvious lesions.  Musculoskeletal: Normal gait and station.   Psych: Normal mood and affect. Alert and oriented x3. Judgment and insight is normal.       Labs: 11/11/2019 results reviewed and discussed with the patient, questions answered.    Assessment & Plan:     79 y.o. male with the following -     1. Cough  Chronic problem, uncontrolled.  The patient continues to have a cough.  No improvement with Flonase.  Cough is worse around dinnertime.  Order placed for omeprazole.  Plan to follow-up in 1 month.  Of note, chest x-ray was normal.  - omeprazole (PRILOSEC) 20 MG delayed-release capsule; Take 1 Cap by mouth every day.  Dispense: 30 Cap; Refill: 2    2. Bipolar disorder, current episode mixed, moderate (HCC)  Chronic problem.  CBC did show mild anemia, unclear if this is related to his valproate.  Order placed for valproic acid level.  The patient does report some depression, however, reports that his mood is generally stable.  Has been on the Depakote for 20 years.  He prefers not to see psychiatry or psychology, however, I did recommend that he start seeing psychiatry.  - REFERRAL TO PSYCHIATRY  - VALPROIC ACID; Future    3. Anemia, " unspecified type  New problem.  Hemoglobin 13.6.  Down from August although that may have been falsely elevated due to dehydration.  Repeat CBC ordered.  If hemoglobin remains low we will plan to do a fit test.  - CBC WITH DIFFERENTIAL; Future      Return in about 4 weeks (around 1/6/2020).    Please note that this dictation was created using voice recognition software. I have made every reasonable attempt to correct obvious errors, but I expect that there are errors of grammar and possibly content that I did not discover before finalizing the note.

## 2019-12-09 NOTE — ASSESSMENT & PLAN NOTE
Chronic problem.  Chest x-ray was normal.  The patient continues to have a chronic cough, worst around and after dinnertime.  No improvement with Flonase.

## 2019-12-09 NOTE — ASSESSMENT & PLAN NOTE
Chronic problem.  The patient reports that he continues to feel depressed, denies any plans for suicide.  He reports that his mood is generally stable.  Has been on Depakote for the past 20 years.  Recent CBC did show some mild anemia, TSH was normal.  Metabolic panel was normal.  The patient prefers not to see a psychiatrist or psychologist.

## 2020-01-06 ENCOUNTER — OFFICE VISIT (OUTPATIENT)
Dept: MEDICAL GROUP | Facility: MEDICAL CENTER | Age: 80
End: 2020-01-06
Payer: MEDICARE

## 2020-01-06 VITALS
HEIGHT: 70 IN | DIASTOLIC BLOOD PRESSURE: 62 MMHG | HEART RATE: 68 BPM | SYSTOLIC BLOOD PRESSURE: 110 MMHG | TEMPERATURE: 98.3 F | OXYGEN SATURATION: 96 % | WEIGHT: 188.93 LBS | BODY MASS INDEX: 27.05 KG/M2

## 2020-01-06 DIAGNOSIS — D64.9 ANEMIA, UNSPECIFIED TYPE: ICD-10-CM

## 2020-01-06 DIAGNOSIS — R05.9 COUGH: ICD-10-CM

## 2020-01-06 PROCEDURE — 99214 OFFICE O/P EST MOD 30 MIN: CPT | Performed by: FAMILY MEDICINE

## 2020-01-07 NOTE — ASSESSMENT & PLAN NOTE
Chronic problem.  The patient has noted significant improvement since starting omeprazole.  His cough has completely resolved.  He has not initiated any dietary changes as of yet.

## 2020-01-07 NOTE — PROGRESS NOTES
"Subjective:     CC: Diagnoses of Cough and Anemia, unspecified type were pertinent to this visit.    HPI: Patient is a 79 y.o. male established patient who presents today to follow-up on cough and labs.      Cough  Chronic problem.  The patient has noted significant improvement since starting omeprazole.  His cough has completely resolved.  He has not initiated any dietary changes as of yet.    Anemia  Chronic problem.  Quite mild.  Unclear if this is chronic or not.  We have yet to receive labs [place]  Currently on Depakote, has been for the past 20 years.      History reviewed. No pertinent past medical history.    Social History     Tobacco Use   • Smoking status: Never Smoker   • Smokeless tobacco: Never Used   Substance Use Topics   • Alcohol use: Yes     Comment: occasionally   • Drug use: Never       Current Outpatient Medications Ordered in Epic   Medication Sig Dispense Refill   • omeprazole (PRILOSEC) 20 MG delayed-release capsule Take 1 Cap by mouth every day. 30 Cap 2   • divalproex ER (DEPAKOTE ER) 500 MG TABLET SR 24 HR Take 1,000 mg by mouth every morning.     • fluticasone (FLONASE) 50 MCG/ACT nasal spray SPRAY 1 SPRAY IN NOSE EVERY DAY. (Patient not taking: Reported on 1/6/2020) 1 Bottle 2     No current Epic-ordered facility-administered medications on file.        Allergies:  Patient has no known allergies.    Health Maintenance: Completed    ROS:  Pulm: no sob, no cough  CV: no chest pain, no palpitations      Objective:       Exam:  /62 (BP Location: Left arm, Patient Position: Sitting, BP Cuff Size: Adult long)   Pulse 68   Temp 36.8 °C (98.3 °F) (Temporal)   Ht 1.778 m (5' 10\")   Wt 85.7 kg (188 lb 15 oz)   SpO2 96%   BMI 27.11 kg/m²  Body mass index is 27.11 kg/m².    General: Normal appearing. No distress.  HEAD: NCAT  EYES: conjunctiva clear, lids without ptosis, pupils equal and reactive to light  EARS: ears normal shape and " contour.  MOUTH: normal dentition   Neck:  Normal ROM  Pulmonary: Clear to auscultation bilaterally, no wheezes rales or rhonchi.  Normal effort. Normal respiratory rate.  Cardiovascular: Regular rate and rhythm, no murmurs rubs or gallops.  Well perfused. No LE edema  Neurologic: Grossly normal, no focal deficits  Skin: Warm and dry.  No obvious lesions.  Musculoskeletal: Normal gait and station.   Psych: Normal mood and affect. Alert and oriented x3. Judgment and insight is normal.      Labs: 12/9/2019 results reviewed and discussed with the patient, questions answered.    Assessment & Plan:     79 y.o. male with the following -     1. Cough  Chronic problem, resolved with omeprazole.  We did discuss that it would be beneficial to try some dietary changes and be able to not use the omeprazole.  Discussed various common triggers.  The patient voiced understanding and will work on his diet.    2. Anemia, unspecified type  Chronic problem.  Uncontrolled.  Unclear if this is chronic or not.  Normal MCV, unlikely to be due to iron deficiency.  Requested labs from prior PCP to compare.  Possibly chronic and may not need any further work-up.      Return if symptoms worsen or fail to improve.    Please note that this dictation was created using voice recognition software. I have made every reasonable attempt to correct obvious errors, but I expect that there are errors of grammar and possibly content that I did not discover before finalizing the note.

## 2020-01-07 NOTE — ASSESSMENT & PLAN NOTE
Chronic problem.  Quite mild.  Unclear if this is chronic or not.  We have yet to receive labs from [place].

## 2020-02-05 DIAGNOSIS — R05.9 COUGH: ICD-10-CM

## 2020-02-06 RX ORDER — OMEPRAZOLE 20 MG/1
CAPSULE, DELAYED RELEASE ORAL
Qty: 30 CAP | Refills: 2 | Status: SHIPPED | OUTPATIENT
Start: 2020-02-06 | End: 2020-04-24

## 2020-03-02 DIAGNOSIS — R05.9 COUGH: ICD-10-CM

## 2020-03-03 RX ORDER — FLUTICASONE PROPIONATE 50 MCG
1 SPRAY, SUSPENSION (ML) NASAL DAILY
Qty: 48 ML | Refills: 0 | Status: SHIPPED | OUTPATIENT
Start: 2020-03-03 | End: 2021-03-01

## 2020-04-24 DIAGNOSIS — R05.9 COUGH: ICD-10-CM

## 2020-04-24 RX ORDER — OMEPRAZOLE 20 MG/1
CAPSULE, DELAYED RELEASE ORAL
Qty: 30 CAP | Refills: 2 | Status: SHIPPED | OUTPATIENT
Start: 2020-04-24 | End: 2020-07-17

## 2020-07-17 ENCOUNTER — OFFICE VISIT (OUTPATIENT)
Dept: MEDICAL GROUP | Facility: MEDICAL CENTER | Age: 80
End: 2020-07-17
Payer: MEDICARE

## 2020-07-17 VITALS
TEMPERATURE: 98.3 F | HEART RATE: 68 BPM | WEIGHT: 184 LBS | HEIGHT: 70 IN | OXYGEN SATURATION: 97 % | BODY MASS INDEX: 26.34 KG/M2 | DIASTOLIC BLOOD PRESSURE: 64 MMHG | SYSTOLIC BLOOD PRESSURE: 126 MMHG

## 2020-07-17 DIAGNOSIS — M50.30 DDD (DEGENERATIVE DISC DISEASE), CERVICAL: ICD-10-CM

## 2020-07-17 DIAGNOSIS — R20.0 NUMBNESS AND TINGLING OF RIGHT ARM: ICD-10-CM

## 2020-07-17 DIAGNOSIS — R05.9 COUGH: ICD-10-CM

## 2020-07-17 DIAGNOSIS — R25.1 TREMOR: ICD-10-CM

## 2020-07-17 DIAGNOSIS — M25.511 ACUTE PAIN OF RIGHT SHOULDER: ICD-10-CM

## 2020-07-17 DIAGNOSIS — R26.9 GAIT ABNORMALITY: ICD-10-CM

## 2020-07-17 DIAGNOSIS — R20.2 NUMBNESS AND TINGLING OF RIGHT ARM: ICD-10-CM

## 2020-07-17 DIAGNOSIS — M50.30 OTHER CERVICAL DISC DEGENERATION, UNSPECIFIED CERVICAL REGION: ICD-10-CM

## 2020-07-17 PROCEDURE — 99214 OFFICE O/P EST MOD 30 MIN: CPT | Performed by: FAMILY MEDICINE

## 2020-07-17 RX ORDER — DIVALPROEX SODIUM 500 MG/1
TABLET, EXTENDED RELEASE ORAL
COMMUNITY
Start: 2020-06-17 | End: 2020-08-11

## 2020-07-17 RX ORDER — MELOXICAM 15 MG/1
15 TABLET ORAL DAILY
Qty: 15 TAB | Refills: 0 | Status: SHIPPED | OUTPATIENT
Start: 2020-07-17 | End: 2020-07-30

## 2020-07-17 RX ORDER — DIVALPROEX SODIUM 125 MG/1
CAPSULE, COATED PELLETS ORAL
COMMUNITY
End: 2020-08-11

## 2020-07-17 RX ORDER — AZITHROMYCIN 250 MG/1
TABLET, FILM COATED ORAL
COMMUNITY
Start: 2020-06-17 | End: 2020-08-11

## 2020-07-17 RX ORDER — CITALOPRAM 20 MG/1
1 TABLET ORAL DAILY
COMMUNITY
End: 2020-08-11

## 2020-07-17 RX ORDER — OMEPRAZOLE 20 MG/1
CAPSULE, DELAYED RELEASE ORAL
Qty: 90 CAP | Refills: 0 | Status: SHIPPED | OUTPATIENT
Start: 2020-07-17 | End: 2020-10-08

## 2020-07-17 ASSESSMENT — FIBROSIS 4 INDEX: FIB4 SCORE: 1.14

## 2020-07-17 NOTE — ASSESSMENT & PLAN NOTE
More instablity  Slower gait  No shuffling.   Noted intention tremor when doing certain fine motor skills  Wife notes slight cognitive change, memory not as good as it was.

## 2020-07-17 NOTE — TELEPHONE ENCOUNTER
Received request via: Pharmacy    Was the patient seen in the last year in this department? Yes    Does the patient have an active prescription (recently filled or refills available) for medication(s) requested? No     Future Appointments       Provider Department Center    7/17/2020 11:20 AM Roxy Ramon M.D. [facility]

## 2020-07-17 NOTE — PROGRESS NOTES
Subjective:     CC: Diagnoses of Acute pain of right shoulder, Numbness and tingling of right arm, Gait abnormality, Other cervical disc degeneration, unspecified cervical region, DDD (degenerative disc disease), cervical, and Tremor were pertinent to this visit.    HPI: Patient is a 80 y.o. male established patient who presents today to with multiple concerns.       Gait abnormality  More instablity  Slower gait  No shuffling.   Noted intention tremor when doing certain fine motor skills  Wife notes slight cognitive change, memory not as good as it was.     Acute Right Shoulder Pain  Right shoulder pain started July 3rd after working in the yard. After about 1 week later he scheduled an appt with Dr. Christopher Tenorio ([facility]). Got cortisone shot on July 14. Concern regading poss failed rotator cuff surgery.  Has tingling in the arm as well.  Has known arthritis in the neck.   Using tylenol with no significant improvement in pain. Tried one norco (from his wife) with no improvement.   Has had significant pain since then.     Tingling/numbness in the Right arm  Tingling in the lower part of the arm and hand. Intermittent. signifncat while trying to use his arm at the grocery store.   Loss of strength when the tingling is severe.     Gait abnormality  More instablity  Slower gait  No shuffling.   Noted intention tremor when doing certain fine motor skills  Wife notes slight cognitive change, memory not as good as it was.       History reviewed. No pertinent past medical history.    Social History     Tobacco Use   • Smoking status: Never Smoker   • Smokeless tobacco: Never Used   Substance Use Topics   • Alcohol use: Yes     Comment: occasionally   • Drug use: Never       Current Outpatient Medications Ordered in Epic   Medication Sig Dispense Refill   • omeprazole (PRILOSEC) 20 MG delayed-release capsule TAKE 1 CAPSULE BY MOUTH EVERY DAY 90 Cap 0   • divalproex (DEPAKOTE SPRINKLE) 125 MG Capsule Delayed Release Sprinkle  "Take  by mouth.     • azithromycin (ZITHROMAX) 250 MG Tab TAKE 2 TABLETS BY MOUTH TODAY, THEN TAKE 1 TABLET DAILY FOR 4 DAYS     • citalopram (CELEXA) 20 MG Tab Take 1 Tab by mouth every day.     • meloxicam (MOBIC) 15 MG tablet Take 1 Tab by mouth every day. 15 Tab 0   • fluticasone (FLONASE) 50 MCG/ACT nasal spray SPRAY 1 SPRAY IN NOSE EVERY DAY. 48 mL 0   • divalproex ER (DEPAKOTE ER) 500 MG TABLET SR 24 HR Take 1,000 mg by mouth every morning.     • divalproex ER (DEPAKOTE ER) 500 MG TABLET SR 24 HR TAKE TWO TABLETS (1,000 MG TOTAL) BY MOUTH DAILY FOR 30 DAYS.       No current [facility]-ordered facility-administered medications on file.        Allergies:  Patient has no known allergies.    Health Maintenance: Completed    ROS:  Pulm: no sob, no cough  CV: no chest pain, no palpitations      Objective:       Exam:  /64 (BP Location: Right arm, Patient Position: Sitting, BP Cuff Size: Adult long)   Pulse 68   Temp 36.8 °C (98.3 °F) (Temporal)   Ht 1.778 m (5' 10\")   Wt 83.5 kg (184 lb)   SpO2 97%   BMI 26.40 kg/m²  Body mass index is 26.4 kg/m².    General: Normal appearing. No distress.  HEAD: NCAT  EYES: conjunctiva clear, lids without ptosis, pupils equal and reactive to light  EARS: ears normal shape and contour.  MOUTH: normal dentition   Neck:  Normal ROM  Pulmonary: Normal effort. Normal respiratory rate.  Cardiovascular: Well perfused. No LE edema  Neurologic: Grossly normal, no focal deficits  Normal strength in the hands and arms.  Normal sensation.  The patient does report current tingling of the right hand.  Skin: Warm and dry.  No obvious lesions.  Musculoskeletal: Normal gait and station.   Psych: Normal mood and affect. Alert and oriented x3. Judgment and insight is normal. EXAM        Assessment & Plan:     80 y.o. male with the following -     1. Acute pain of right shoulder  New problem.  Noted after doing work out in the yard.  Seen by orthopedic surgery, x-ray done.  They feel this is " likely failure of a rotator cuff surgery.  He did get a cortisone injection 3 days ago, no improvement yet.  He is continuing to have significant pain, prescription given for meloxicam.  Plan to follow-up in 1 month.  - meloxicam (MOBIC) 15 MG tablet; Take 1 Tab by mouth every day.  Dispense: 15 Tab; Refill: 0    2. Numbness and tingling of right arm  New problem.  Notes significant numbness and tingling in the right hand and lower arm, waxes and wanes and can even become weak.  Concern is he has a history of arthritis of the C-spine.  MRI ordered.  - MR-CERVICAL SPINE-W/O; Future    3. Gait abnormality  New problem.  Slowly progressing.  He reports an associated tremor in the hands as well as some possible cognitive decline.  Referral placed to neurology.  - REFERRAL TO NEUROLOGY    4. Other cervical disc degeneration, unspecified cervical region  - MR-CERVICAL SPINE-W/O; Future    5. DDD (degenerative disc disease), cervical  - MR-CERVICAL SPINE-W/O; Future    6. Tremor  - REFERRAL TO NEUROLOGY    Return in about 4 weeks (around 8/14/2020).    Please note that this dictation was created using voice recognition software. I have made every reasonable attempt to correct obvious errors, but I expect that there are errors of grammar and possibly content that I did not discover before finalizing the note.

## 2020-07-27 ENCOUNTER — APPOINTMENT (OUTPATIENT)
Dept: RADIOLOGY | Facility: MEDICAL CENTER | Age: 80
End: 2020-07-27
Attending: FAMILY MEDICINE
Payer: MEDICARE

## 2020-07-29 ENCOUNTER — HOSPITAL ENCOUNTER (OUTPATIENT)
Dept: RADIOLOGY | Facility: MEDICAL CENTER | Age: 80
End: 2020-07-29
Attending: FAMILY MEDICINE
Payer: MEDICARE

## 2020-07-29 DIAGNOSIS — R20.2 NUMBNESS AND TINGLING OF RIGHT ARM: ICD-10-CM

## 2020-07-29 DIAGNOSIS — M50.30 DDD (DEGENERATIVE DISC DISEASE), CERVICAL: ICD-10-CM

## 2020-07-29 DIAGNOSIS — M25.511 ACUTE PAIN OF RIGHT SHOULDER: ICD-10-CM

## 2020-07-29 DIAGNOSIS — M50.30 OTHER CERVICAL DISC DEGENERATION, UNSPECIFIED CERVICAL REGION: ICD-10-CM

## 2020-07-29 DIAGNOSIS — R20.0 NUMBNESS AND TINGLING OF RIGHT ARM: ICD-10-CM

## 2020-07-29 PROCEDURE — 72141 MRI NECK SPINE W/O DYE: CPT

## 2020-07-30 DIAGNOSIS — M48.02 CERVICAL STENOSIS OF SPINAL CANAL: ICD-10-CM

## 2020-07-30 DIAGNOSIS — R20.2 NUMBNESS AND TINGLING OF RIGHT ARM: ICD-10-CM

## 2020-07-30 DIAGNOSIS — R20.0 NUMBNESS AND TINGLING OF RIGHT ARM: ICD-10-CM

## 2020-07-30 RX ORDER — MELOXICAM 15 MG/1
TABLET ORAL
Qty: 15 TAB | Refills: 0 | Status: SHIPPED | OUTPATIENT
Start: 2020-07-30 | End: 2021-01-08

## 2020-08-11 ENCOUNTER — OFFICE VISIT (OUTPATIENT)
Dept: NEUROLOGY | Facility: MEDICAL CENTER | Age: 80
End: 2020-08-11
Payer: MEDICARE

## 2020-08-11 VITALS
DIASTOLIC BLOOD PRESSURE: 70 MMHG | SYSTOLIC BLOOD PRESSURE: 120 MMHG | RESPIRATION RATE: 16 BRPM | HEIGHT: 70 IN | TEMPERATURE: 98.1 F | BODY MASS INDEX: 26.16 KG/M2 | OXYGEN SATURATION: 97 % | HEART RATE: 70 BPM | WEIGHT: 182.76 LBS

## 2020-08-11 DIAGNOSIS — R26.9 GAIT ABNORMALITY: ICD-10-CM

## 2020-08-11 DIAGNOSIS — R25.1 TREMOR: ICD-10-CM

## 2020-08-11 DIAGNOSIS — F31.62 BIPOLAR DISORDER, CURRENT EPISODE MIXED, MODERATE (HCC): ICD-10-CM

## 2020-08-11 DIAGNOSIS — R25.2 LEG CRAMPS: ICD-10-CM

## 2020-08-11 DIAGNOSIS — M54.12 CERVICAL RADICULOPATHY: ICD-10-CM

## 2020-08-11 PROCEDURE — 99205 OFFICE O/P NEW HI 60 MIN: CPT | Performed by: PSYCHIATRY & NEUROLOGY

## 2020-08-11 ASSESSMENT — ENCOUNTER SYMPTOMS
FALLS: 0
WEIGHT LOSS: 0
SHORTNESS OF BREATH: 0
BRUISES/BLEEDS EASILY: 0
SORE THROAT: 0
EYE DISCHARGE: 0
FEVER: 0
ABDOMINAL PAIN: 0
HALLUCINATIONS: 0

## 2020-08-11 ASSESSMENT — FIBROSIS 4 INDEX: FIB4 SCORE: 1.14

## 2020-08-11 NOTE — PROGRESS NOTES
"Chief Complaint   Patient presents with   • New Patient     Gait abnormality , tremors x 1 year bilateral hand tremoring x 6 months .      Patient is referred by Roxy Ramon M.D. for initial consult.    History of present illness:  Laurent Infante 80 y.o. male presents today for gait disturbance.   History is obtained from patient.  and Patient is accompanied by self. Left handed.     Duration/timing: ~2017, gradual onset, with slow progression since 2019  Context: Gait disturbance: wife noticed he was not as upright and not a [facility] stride. This is associated with a tremor - in the hands and when he holds an implement, L>R. Affecting his ability to penmanship. Been on depakote since 1970. 2 cups of coffee a day.   Location: Posture  Quality: Stooped  Severity: Mild  Modifying factors: Worse with time  Associated signs/symptoms: burning pain in the right shoulder with tingling down to the right hand and weakness in 7/2020 not improved with cortisone shot/took pain killer with help but weakness and tingling continued - now \"back to normal\"; leg cramps in his thighs improved with pedialyte; urinary urgency worsening  Denies: bladder incontinence, bowel incontinence, headaches, vision changes/loss or diplopia, other weakness, other numbness/tingling, swallowing difficulties, speech disturbance, incoordination, hallucinations, REM behavior disorder, falls, personality change and HIV or syphilis risk factors, change in depakote medication, tremor elsewhere, improvement of tremor with EtOH, near falls, low back pain, no other psychotropic medications    He comes today because about 2018 he saw internist and stated his neck problems is causing his posture as above. 2019 he got salmonella - since then his tremors became apparent.     Patient has tried:  -Depakote - started in 1970s, stopping medication caused severe depression, last change 8 years ago, no side effects   -Celexa - stopped   -Psychiatric cocktail " "1960s      Past medical history:   Past Medical History:   Diagnosis Date   • Bipolar 1 disorder (HCC)    • Depression        Past surgical history:   Past Surgical History:   Procedure Laterality Date   • DENTAL EXTRACTION(S)     • HERNIA REPAIR     • SHOULDER SURGERY      bilateral rotator cuff repair       Family history:   Family History   Problem Relation Age of Onset   • Cancer Mother    • Stroke Father    • Psychiatric Illness Sister    No neurological disease in family.     Social history:   Tobacco Use   • Smoking status: Never Smoker   • Smokeless tobacco: Never Used   Substance and Sexual Activity   • Alcohol use: Yes     Comment: occasionally   • Drug use: Never       Current medications:   Current Outpatient Medications   Medication   • meloxicam (MOBIC) 15 MG tablet   • omeprazole (PRILOSEC) 20 MG delayed-release capsule   • fluticasone (FLONASE) 50 MCG/ACT nasal spray   • divalproex ER (DEPAKOTE ER) 500 MG TABLET SR 24 HR     No current facility-administered medications for this visit.        Medication Allergy:  No Known Allergies    Review of Systems   Constitutional: Negative for fever and weight loss.   HENT: Negative for sore throat.    Eyes: Negative for discharge.   Respiratory: Negative for shortness of breath.    Cardiovascular: Negative for leg swelling.   Gastrointestinal: Negative for abdominal pain.   Genitourinary: Negative for dysuria.   Musculoskeletal: Negative for falls.   Skin: Negative for rash.   Neurological:        As per HPI   Endo/Heme/Allergies: Does not bruise/bleed easily.   Psychiatric/Behavioral: Negative for hallucinations.       Physical examination:   Vitals:    08/11/20 1004   BP: 120/70   BP Location: Left arm   Patient Position: Sitting   Pulse: 70   Resp: 16   Temp: 36.7 °C (98.1 °F)   TempSrc: Temporal   SpO2: 97%   Weight: 82.9 kg (182 lb 12.2 oz)   Height: 1.778 m (5' 10\")     General: Patient in well nourished in no apparent distress.  Eyes: Ophthalmoscopic " examination performed but discs cannot be visualized well enough to characterize bilaterally.  HENT: Normocephalic, atraumatic. Mallapatic score 2  Cardiovascular: No lower extremity edema.  Respiratory: Normal respiratory effort.   Skin: No appreciable signs of acute rashes or bruising.   Musculoskeletal: No signs of joint or muscle swelling.   Psychiatric: Pleasant.     NEUROLOGICAL EXAM:   Mental status: Awake, alert and fully oriented to person, place, time and situation. Normal attention, concentration and fund of knowledge for education level.   Speech and language: Speech is fluent without errors and clear.  Cranial nerve exam:  II: Pupils are equally round and reactive to light. Visual fields are intact by confrontation.  III, IV, VI: EOMI, no diplopia, no ptosis.  V: Sensation to light touch is normal over V1-3 distributions bilaterally.  .  VII: Facial movements are symmetrical. There is no facial droop. .  VIII: Hearing aids present on  bilaterally  IX: Palate elevates symmetrically, uvula is midline. Dysarthria is not present.  XI: Shoulder shrug are symmetrical and strong.   XII: Tongue protrudes midline.    Motor exam:  Muscle tone is normal in all 4 limbs. and Find kinetic tremor in the left upper extremity, mild.  Not appreciated in the right upper extremity.  Representative tapping is of good amplitude and speed..    Muscle strength:     Right  Left  Deltoid   5/5  5/5      Biceps   5/5  5/5  Triceps  5/5  5/5   Wrist extensors 5/5  5/5  Wrist flexors  5/5  5/5     5/5  5/5  Interossei  5/5  5/5  Thenar (APB)  NT/5  NT/5   Hip flexors  5/5  5/5  Quadriceps  5/5  5/5    Hamstrings  5/5  5/5  Dorsiflexors  5/5  5/5  Plantarflexors  5/5  5/5  Toe extension  NT/5  NT/5  NT = not tested    Sensory exam:  Intact to Light touch, Pinprick, Temperature and Proprioception in bilateral upper and lower extremity.  Mild decreased vibration in the right hallux.  Present on the left and in the  arms.    Reflexes:       Right  Left  Biceps   0/4  0/4  Triceps  0/4  0/4  Brachioradialis 0/4  0/4  Knee jerk  0/4  0/4  Ankle jerk  0/4  0/4   bilateral toes are downgoing to plantar stimulation..      Coordination: shows a normal finger-nose-finger and heel to shin bilaterally.   Gait: Casual gait is normal., Heel walk is normal., Toe walk is normal. and Arm swing is robust and symmetric.  Is minimally stooped.      ANCILLARY DATA REVIEWED:   Lab Data Review:  Lab Results   Component Value Date/Time    WBC 6.1 12/09/2019 04:29 PM    RBC 4.40 (L) 12/09/2019 04:29 PM    HEMOGLOBIN 13.6 (L) 12/09/2019 04:29 PM    HEMATOCRIT 40.9 (L) 12/09/2019 04:29 PM    MCV 93.0 12/09/2019 04:29 PM    MCH 30.9 12/09/2019 04:29 PM    MCHC 33.3 (L) 12/09/2019 04:29 PM    MPV 9.6 12/09/2019 04:29 PM    NEUTSPOLYS 48.70 12/09/2019 04:29 PM    LYMPHOCYTES 38.40 12/09/2019 04:29 PM    MONOCYTES 11.60 12/09/2019 04:29 PM    EOSINOPHILS 0.70 12/09/2019 04:29 PM    BASOPHILS 0.30 12/09/2019 04:29 PM      Lab Results   Component Value Date/Time    SODIUM 140 11/11/2019 12:57 PM    POTASSIUM 4.0 11/11/2019 12:57 PM    CHLORIDE 107 11/11/2019 12:57 PM    CO2 26 11/11/2019 12:57 PM    GLUCOSE 89 11/11/2019 12:57 PM    BUN 17 11/11/2019 12:57 PM    CREATININE 0.90 11/11/2019 12:57 PM     Lab Results   Component Value Date/Time    ASTSGOT 13 11/11/2019 1257    ALTSGPT 12 11/11/2019 1257    ALKPHOSPHAT 50 11/11/2019 1257    ALBUMIN 3.9 11/11/2019 1257     TSH 2.7  VPA level 69.9 in December 2019    Imaging:   MRI C-spine without contrast July 2020:  1. Mild to moderate multilevel degenerative change of the cervical spine, most at C4-5.  2. Mild spinal canal narrowing at C4-5.  3. Multilevel neuroforaminal narrowing, most at C4-5 and C5-6.    At the C4-5 level: Disc desiccation with small posterior disk osteophyte complex. Mild spinal canal narrowing. There is moderate right and severe left neuroforaminal narrowing due to uncovertebral  hypertrophy.     At the C5-6 level: Disc desiccation. There is no appreciable disk osteophyte complex or spinal canal narrowing. There is severe right and severe left neuroforaminal narrowing due to uncovertebral hypertrophy.     At the C6-7 level: Disc desiccation with small posterior disk osteophyte complex. No spinal canal narrowing. There is moderate right and severe left neuroforaminal narrowing due to uncovertebral hypertrophy.        Records reviewed: PCP note reviewed dated July 2020.  Patient having more stability, slower gait without shuffling.  He has an intentional tremor as well.  There may be a cognitive change associated.  He also has tingling on the lower part of his arm and hand.  MRI C-spine was ordered.        ASSESSMENT AND PLAN:    1. Gait abnormality: Uncertain etiology.  Patient describes an unstable stooped-like posture.  He is mildly stooped however there is no strong evidence of parkinsonism.  In association with a tremor, gait instability, worsening urinary urgency ruling out increased intracranial pressure is necessary.  Will evaluate for vitamin B12 deficiency as well given that he is on a PPI.  Otherwise will likely monitor clinically.  He is not a fall risk at this point in time.  Cannot rule out a contribution from osteopenia affecting his posture.  -Consider PT for gait training.  - METHYLMALONIC ACID, SERUM; Future  - VITAMIN B12; Future  - FOLATE; Future  - MR-BRAIN-W/O; Future  -Consider imaging studies to evaluate for osteopenia/porosis if work-up above is unremarkable    2. Tremor: Patient has a mild kinetic tremor in the left upper extremity of uncertain etiology.  Based on his clinical exam I do not suspect that he has Parkinson's disease at this point in time.  Seems more consistent with an essential type tremor however it may be early in the course given that it is unilateral.  Currently not affecting ADLs or IADLs.  Recent TSH and valproic acid level were  unremarkable.  -Monitor clinical exam for signs of parkinsonism versus essential tremor  -Consider DaTscan in the future if concerning and clarification is necessary for management  -We discussed some medication options for essential type tremor which she declines today    3. Cervical radiculopathy: Based on clinical history patient likely has a cervical radiculopathy.  He does have significant degenerative disc disease of the neck mostly C4-C7 without significant spinal canal narrowing.  He states he has since improved but did experience weakness and numbness.  Will evaluate for subtle changes and denervation with EMG/NCS.  This may help surgical decision making.  - REFERRAL TO NEURODIAGNOSTICS (EEG,EP,EMG/NCS/DBS) Modality Requested: EMG/NCS-Comment Extremities  -Recommend follow-up with spine surgeon as ordered by primary care    4. Bipolar disorder, current episode mixed, moderate (HCC): Chronically on Depakote which can cause tremor but tremor seems to be unrelated    5. Leg cramps: Mild and likely benign in nature.  There is no weakness or evidence suggestive of a myopathy or claudication symptoms.  Improved with Pedialyte.  -Conservative measures at this point in time      FOLLOW-UP: Return in about 6 months (around 2/11/2021).  Sooner if needed, for clinical monitoring and review of work-up  EDUCATION AND COUNSELING:  -I personally discussed the following with the patient:   The above procedure risks/benefits/side effects/alternatives. and Differential diagnosis, medical reasoning as above, reasons for testing    The patient communicates understanding of the above and agrees that due to the complexity of his/her diagnosis, results of any testing and further recommendations will typically be discussed/made during a face to face encounter in my office. The patient and/or family further understands it is their responsibility to keep proper follow up.     Disclaimer  This dictation was created using voice  recognition software. I have made every reasonable attempt to avoid dictation errors, but this document may contain an error not identified before finalizing. If the error changes the accuracy of the document, I would appreciate it being brought to my attention. Thank you very much.     Massiel Dowd MD  Neurology  [facility]

## 2020-08-17 ENCOUNTER — OFFICE VISIT (OUTPATIENT)
Dept: MEDICAL GROUP | Facility: MEDICAL CENTER | Age: 80
End: 2020-08-17
Payer: MEDICARE

## 2020-08-17 VITALS
DIASTOLIC BLOOD PRESSURE: 58 MMHG | OXYGEN SATURATION: 97 % | SYSTOLIC BLOOD PRESSURE: 106 MMHG | HEIGHT: 70 IN | TEMPERATURE: 98.6 F | BODY MASS INDEX: 26.05 KG/M2 | HEART RATE: 75 BPM | WEIGHT: 182 LBS

## 2020-08-17 DIAGNOSIS — R26.9 GAIT ABNORMALITY: ICD-10-CM

## 2020-08-17 DIAGNOSIS — R25.1 TREMOR: ICD-10-CM

## 2020-08-17 DIAGNOSIS — R20.0 NUMBNESS AND TINGLING OF RIGHT ARM: ICD-10-CM

## 2020-08-17 DIAGNOSIS — R20.2 NUMBNESS AND TINGLING OF RIGHT ARM: ICD-10-CM

## 2020-08-17 PROCEDURE — 99214 OFFICE O/P EST MOD 30 MIN: CPT | Performed by: FAMILY MEDICINE

## 2020-08-17 RX ORDER — DIVALPROEX SODIUM 500 MG/1
TABLET, EXTENDED RELEASE ORAL
COMMUNITY
Start: 2020-07-29 | End: 2021-01-18

## 2020-08-17 ASSESSMENT — FIBROSIS 4 INDEX: FIB4 SCORE: 1.14

## 2020-08-18 PROBLEM — R25.1 TREMOR: Status: ACTIVE | Noted: 2020-08-18

## 2020-08-18 PROBLEM — R20.2 NUMBNESS AND TINGLING OF RIGHT ARM: Status: ACTIVE | Noted: 2020-08-18

## 2020-08-18 PROBLEM — R20.0 NUMBNESS AND TINGLING OF RIGHT ARM: Status: ACTIVE | Noted: 2020-08-18

## 2020-08-18 NOTE — PROGRESS NOTES
Subjective:     CC: Diagnoses of Gait abnormality, Tremor, and Numbness and tingling of right arm were pertinent to this visit.    HPI: Patient is a 80 y.o. male established patient who presents today to follow-up on gait abnormality/tremors.      Gait abnormality  Tremor  The patient was seen by neurology.  He has a mild loose drooped posture with a slightly unstable gait.  He also has an associated tremor of the hands.  He has had some worsening urinary urgency, MRI order was placed for the brain.  Dr. Morris also ordered B12, MMA, folate.      Right arm numbness and tingling  Chronic problem.  MRI of the C-spine ordered which did show multilevel neural neuroforaminal narrowing most at C4-5 and C5-6 with mild spinal canal narrowing at C4-5 and mild to moderate multi level degenerative changes.    Past Medical History:   Diagnosis Date   • Bipolar 1 disorder (HCC)    • Depression        Social History     Tobacco Use   • Smoking status: Never Smoker   • Smokeless tobacco: Never Used   Substance Use Topics   • Alcohol use: Yes     Comment: occasionally   • Drug use: Never       Current Outpatient Medications Ordered in Epic   Medication Sig Dispense Refill   • meloxicam (MOBIC) 15 MG tablet TAKE 1 TABLET BY MOUTH EVERY DAY 15 Tab 0   • omeprazole (PRILOSEC) 20 MG delayed-release capsule TAKE 1 CAPSULE BY MOUTH EVERY DAY 90 Cap 0   • fluticasone (FLONASE) 50 MCG/ACT nasal spray SPRAY 1 SPRAY IN NOSE EVERY DAY. 48 mL 0   • divalproex ER (DEPAKOTE ER) 500 MG TABLET SR 24 HR Take 1,000 mg by mouth every morning. Two tabs in am     • divalproex ER (DEPAKOTE ER) 500 MG TABLET SR 24 HR TAKE 2 TABLETS (1,000MG) ORALLY EVERY DAY FOR 30 DAYS       No current [facility]-ordered facility-administered medications on file.        Allergies:  Patient has no known allergies.    Health Maintenance: Completed    ROS:  Pulm: no sob, no cough  CV: no chest pain, no palpitations      Objective:       Exam:  /58 (BP Location: Right arm,  "Patient Position: Sitting, BP Cuff Size: Adult long)   Pulse 75   Temp 37 °C (98.6 °F) (Temporal)   Ht 1.778 m (5' 10\")   Wt 82.6 kg (182 lb)   SpO2 97%   BMI 26.11 kg/m²  Body mass index is 26.11 kg/m².    General: Normal appearing. No distress.  HEAD: NCAT  EYES: conjunctiva clear, lids without ptosis, pupils equal and reactive to light  EARS: ears normal shape and contour.  MOUTH: normal dentition   Neck:  Normal ROM  Pulmonary: Normal effort. Normal respiratory rate.  Cardiovascular: Well perfused. No LE edema  Neurologic: Grossly normal, no focal deficits  Skin: Warm and dry.  No obvious lesions.  Musculoskeletal: Slightly unstable gait.  Psych: Normal mood and affect. Alert and oriented x3. Judgment and insight is normal.    Labs: 12/9/2019 results reviewed and discussed with the patient, questions answered.    Assessment & Plan:     80 y.o. male with the following -     1. Gait abnormality  Chronic problem.  Unlikely be due to Parkinson's according to neurology.  She did order an MRI as he has been having some worsening urinary urgency.  She also ordered B12, MMA and folate.  He plans to follow-up with Dr. Dowd.    2. Tremor  Chronic problem.  Now being followed by neurology.  Labs ordered as above.    3. Numbness and tingling of arm  Chronic problem.  There were some abnormalities of the C-spine of the MRI.  Referral placed to neurosurgery, gave patient phone number to get that scheduled.    Return if symptoms worsen or fail to improve.    Please note that this dictation was created using voice recognition software. I have made every reasonable attempt to correct obvious errors, but I expect that there are errors of grammar and possibly content that I did not discover before finalizing the note.      "

## 2020-08-26 ENCOUNTER — HOSPITAL ENCOUNTER (OUTPATIENT)
Dept: LAB | Facility: MEDICAL CENTER | Age: 80
End: 2020-08-26
Attending: PSYCHIATRY & NEUROLOGY
Payer: MEDICARE

## 2020-08-26 ENCOUNTER — NON-PROVIDER VISIT (OUTPATIENT)
Dept: NEUROLOGY | Facility: MEDICAL CENTER | Age: 80
End: 2020-08-26
Payer: MEDICARE

## 2020-08-26 ENCOUNTER — HOSPITAL ENCOUNTER (OUTPATIENT)
Dept: RADIOLOGY | Facility: MEDICAL CENTER | Age: 80
End: 2020-08-26
Attending: PSYCHIATRY & NEUROLOGY
Payer: MEDICARE

## 2020-08-26 DIAGNOSIS — M54.12 CERVICAL RADICULOPATHY: ICD-10-CM

## 2020-08-26 DIAGNOSIS — R26.9 GAIT ABNORMALITY: ICD-10-CM

## 2020-08-26 LAB
FOLATE SERPL-MCNC: 17.5 NG/ML
VIT B12 SERPL-MCNC: 350 PG/ML (ref 211–911)

## 2020-08-26 PROCEDURE — 82607 VITAMIN B-12: CPT

## 2020-08-26 PROCEDURE — 36415 COLL VENOUS BLD VENIPUNCTURE: CPT

## 2020-08-26 PROCEDURE — 70551 MRI BRAIN STEM W/O DYE: CPT

## 2020-08-26 PROCEDURE — 95886 MUSC TEST DONE W/N TEST COMP: CPT | Mod: RT | Performed by: PSYCHIATRY & NEUROLOGY

## 2020-08-26 PROCEDURE — 83921 ORGANIC ACID SINGLE QUANT: CPT

## 2020-08-26 PROCEDURE — 82746 ASSAY OF FOLIC ACID SERUM: CPT

## 2020-08-26 PROCEDURE — 95912 NRV CNDJ TEST 11-12 STUDIES: CPT | Performed by: PSYCHIATRY & NEUROLOGY

## 2020-08-26 NOTE — PROCEDURES
"NERVE CONDUCTION STUDIES AND ELECTROMYOGRAPHY REPORT  [facility]  08/26/2020           IMPRESSION:  This is an abnormal electrodiagnostic study due to:  1. Low right median motor response of uncertain etiology with corresponding chronic inactive reinnervation changes in the right APB.  This is of uncertain etiology but can represent prior injury to muscular branch given sparing of the right median sensory response.   2. 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     There is no evidence of right cervical radiculopathy or right brachial plexopathy.  Recommend clinical correlation of the above.    Massiel Dowd MD  Neurology - Neurophysiology  [facility]        REASON FOR REFERRAL:  Mr. Laurent Infante 80 y.o. referred by myself for an evaluation of burning pain in the right shoulder with radiation down to the right hand beginning in July 2020.  Symptoms have persisted for some time followed by essentially complete resolution.  He does report tingling in his hand intermittently but does not remember a particular history of carpal tunnel syndrome on the right.  The left arm is asymptomatic. He is right handed.     Height: 5'10\"  Weight: 182 lbs    Symptom focused neurological exam shows normal strength and sensation in the right upper extremity.      ELECTRODIAGNOSTIC EXAMINATION:  Nerve conduction studies (NCS) and electromyography (EMG) are utilized to evaluate direct or indirect damage to the peripheral nervous system. NCS are performed to measure the nerve(s) response(s) to electrostimulation across a given nerve segment. EMG evaluates the passive and active electrical activity of the muscle(s) in question.  Muscles are innervated by specific peripheral nerves and roots. Often times, several nerves the muscle to be examined in order to determine the presence or absence of the disease process. Furthermore, nerves and muscles may need to be tested in a [id number] comparison, " as well as in additional extremities, as this may be crucial in characterizing the extent of the disease process, which may be diffuse or isolated and of varying degree of severity. The extent of the neurodiagnostic exam is justified as it may help arrive to a proper diagnosis, which ultimately may contribute to better management of the patient. Therefore, the nerves to muscles examined during the study were medically necessary.    Unless otherwise noted, temperature of the extremity(s) study was monitored before and during the examination and is difficult to maintain between 32 and 36 degrees C for the upper extremities. The patient tolerated testing well, without any complications.       NERVE CONDUCTION STUDY SUMMARY:  Selected nerves of the bilateral upper extremity are studied.    Normal left median sensory and motor responses.  Slowing suspect secondary to temperature.  F-wave is prolonged.  Normal right median sensory response.  Abnormal right median motor response due to low amplitude and secondary slowing.  F wave is unobtainable.  Normal left ulnar sensory and motor responses.  Slowing thought to be technical.  F-wave is prolonged.  Abnormal right ulnar motor response due to prolonged distal latency.  F-wave is prolonged.  Normal right ulnar sensory response.  Normal right radial sensory response.    NEEDLE EMG SUMMARY:  Concentric needle study of selected right upper extremity muscles is performed.     Insertion activity is normal in all muscles sampled.   Large amplitude prolonged duration motor unit potentials are appreciated in the right abductor pollicis brevis.  With activation, there are normal morphology (amplitude/duration) motor unit action potentials firing with normal recruitment in muscles tested.       PATIENT DATA TABLES  Nerve Conduction Studies     Stim Site NR Onset (ms) Norm Onset (ms) O-P Amp (µV) Norm O-P Amp Site1 Site2 Delta-P (ms) Dist (cm) Brando (m/s) Norm Brando (m/s)   Left Median  Anti Sensory (2nd Digit)   Wrist    3.6 <3.8 17.4 >10 Wrist 2nd Digit 4.5 14.0 *31 >50   Right Median Anti Sensory (2nd Digit)   Wrist    3.2 <3.8 21.3 >10 Wrist 2nd Digit 4.4 14.0 *32 >50   Right Radial Anti Sensory (Base 1st Digit)   Wrist    2.1 <2.8 19.5 >10 Wrist Base 1st Digit 2.8 10.0 *36 >50   Left Ulnar Anti Sensory (5th Digit)   Wrist    2.9 <3.2 16.9 >5 Wrist 5th Digit 4.3 14.0 *33 >50   Right Ulnar Anti Sensory (5th Digit)   Wrist    3.2 <3.2 11.9 >5 Wrist 5th Digit 4.5 14.0 *31 >50        Stim Site NR Onset (ms) Norm Onset (ms) O-P Amp (mV) Norm O-P Amp Site1 Site2 Delta-0 (ms) Dist (cm) Brando (m/s) Norm Brando (m/s)   Left Median Motor (Abd Poll Brev)   Wrist    3.6 <4 10.2 >5 Elbow Wrist 5.9 28.0 *47 >50   Elbow    9.5  9.3          Right Median Motor (Abd Poll Brev)   Wrist    4.0 <4 *2.0 >5 Elbow Wrist 6.9 30.0 *43 >50   Elbow    10.9  1.4          Left Ulnar Motor (Abd Dig Min)   Wrist    3.1 <3.1 10.2 >7 B Elbow Wrist 5.6 21.0 *38 >50   B Elbow    8.7  8.7  A Elbow B Elbow 2.8 10.0 36    A Elbow    11.5  8.5          Right Ulnar Motor (Abd Dig Min)   Wrist    *3.8 <3.1 7.1 >7 B Elbow Wrist 3.9 22.0 56 >50   B Elbow    7.7  5.3  A Elbow B Elbow 2.5 11.0 44    A Elbow    10.2  5.3               Stim Site NR Peak (ms) Norm Peak (ms) P-T Amp (µV) Site1 Site2 Delta-P (ms) Norm Delta (ms)   Right Median/Ulnar Palm Comparison (Wrist - 8cm)   Median Palm    2.3 <2.3 46.0 Median Palm Ulnar Palm 0.0 <0.3   Ulnar Palm    2.3 <2.3 15.4         F Wave Studies     NR F-Lat (ms) Lat Norm (ms)   Left Median (Abd Poll Brev)      *33.93 <31   Right Median (Abd Poll Brev)   *NR  <31   Left Ulnar (Abd Dig Min)      *34.52 <32   Right Ulnar (Abd Dig Min)      *35.00 <32                                           Electromyography     Side Muscle Nerve Root Ins Act Fibs Psw Amp Dur Poly Recrt Int Pat Comment   Right 1stDorInt Ulnar C8-T1 Nml Nml Nml Nml Nml 0 Nml Nml    Right PronatorTeres Median C6-7 Nml Nml Nml Nml Nml 0  Nml Nml    Right Biceps Musculocut C5-6 Nml Nml Nml Nml Nml 0 Nml Nml    Right Triceps Radial C6-7-8 Nml Nml Nml Nml Nml 0 Nml Nml    Right Deltoid Axillary C5-6 Nml Nml Nml Nml Nml 0 Nml Nml    Right Abd Dig Min Ulnar C8-T1 Nml Nml Nml Nml Nml 0 Nml Nml    Right Abd Poll Brev Median C8-T1 Nml Nml Nml *Incr *>12ms 0 Nml Nml    Right FlexDigProf Ulnar C8,T1 Nml Nml Nml Nml Nml 0 Nml Nml

## 2020-08-26 NOTE — PROGRESS NOTES
Personally spoke with patient regarding result.  He is currently asymptomatic.  Given there are chronic inactive changes and no other suspicious findings I think monitoring is reasonable.  We discussed clinical symptoms to monitor for and to reach out if present.  Maintaining adequate temperature was extremely difficult during the study and I believe there are some technical factors as well.  Patient communicated understanding of the above and to reach out if symptoms develop.

## 2020-08-29 LAB — METHYLMALONATE SERPL-SCNC: <0.1 UMOL/L (ref 0–0.4)

## 2020-10-05 ENCOUNTER — TELEPHONE (OUTPATIENT)
Dept: MEDICAL GROUP | Facility: MEDICAL CENTER | Age: 80
End: 2020-10-05

## 2020-10-05 DIAGNOSIS — R26.9 GAIT ABNORMALITY: ICD-10-CM

## 2020-10-05 DIAGNOSIS — R26.89 IMBALANCE: ICD-10-CM

## 2020-10-05 NOTE — TELEPHONE ENCOUNTER
1. Caller Name:  Laurent Infante  Phone Number: [phone] (home)   How would the patient prefer to be contacted with a response: Phone call OK to leave a detailed message    2. SPECIFIC Action To Be Taken: Referral pending, please sign.    3. Diagnosis/Clinical Reason for Request: Patient falling frequently/balance problems     4. Specialty & Provider Name/Lab/Imaging Location:     5. Is appointment scheduled for requested order/referral: no    Patient was informed they will receive a return phone call from the office ONLY if there are any questions before processing their request. Advised to call back if they haven't received a call from the referral department in 5 days.

## 2020-10-08 DIAGNOSIS — R05.9 COUGH: ICD-10-CM

## 2020-10-08 RX ORDER — OMEPRAZOLE 20 MG/1
CAPSULE, DELAYED RELEASE ORAL
Qty: 90 CAP | Refills: 0 | Status: SHIPPED | OUTPATIENT
Start: 2020-10-08 | End: 2021-01-18

## 2020-10-19 ENCOUNTER — PHYSICAL THERAPY (OUTPATIENT)
Dept: PHYSICAL THERAPY | Facility: MEDICAL CENTER | Age: 80
End: 2020-10-19
Attending: FAMILY MEDICINE
Payer: MEDICARE

## 2020-10-19 DIAGNOSIS — R26.9 GAIT ABNORMALITY: ICD-10-CM

## 2020-10-19 DIAGNOSIS — R26.89 IMBALANCE: ICD-10-CM

## 2020-10-19 PROCEDURE — 97161 PT EVAL LOW COMPLEX 20 MIN: CPT

## 2020-10-19 PROCEDURE — 97110 THERAPEUTIC EXERCISES: CPT

## 2020-10-19 ASSESSMENT — BALANCE ASSESSMENTS
REACHING FORWARD WITH OUTSTRETCHED ARM WHILE STANDING: 3
LOOK OVER LEFT AND RIGHT SHOULDERS WHILE STANDING: 4
STANDING UNSUPPORTED WITH FEET TOGETHER: 3
PICK UP OBJECT FROM THE FLOOR FROM A STANDING POSITION: 4
TRANSFERS: 4
BALANCE - STANDING DYNAMIC: FAIR
SITTING UNSUPPORTED: 4
LONG VERSION TOTAL SCORE (MAX 56): 47
TURN 360 DEGREES: 4
SITTING TO STANDING: 3
PLACE ALTERNATE FOOT ON STEP OR STOOL WHILE STANDING UNSUPPORTED: 3
STANDING UNSUPPORTED WITH EYES CLOSED: 3
BALANCE - STANDING STATIC: GOOD
STANDING TO SITTING: 3
BALANCE - SITTING STATIC: NORMAL
LONG VERSION TOTAL SCORE (MAX 56): 47
STANDING UNSUPPORTED: 4
STANDING ON ONE LEG: 3
BALANCE - SITTING DYNAMIC: NORMAL
STANDING UNSUPPORTED ONE FOOT IN FRONT: 2

## 2020-10-19 ASSESSMENT — GAIT ASSESSMENTS
GAIT AND PIVOT TURN: NORMAL: PIVOT TURNS SAFELY WITHIN 3 SECONDS AND STOPS QUICKLY WITH NO LOSS OF BALANCE
STEP AROUND OBSTACLES: NORMAL: IS ABLE TO WALK AROUND CONES SAFELY WITHOUT CHANGING GAIT SPEED, NO EVIDENCE OF IMBALANCE
DYNAMIC GAIT INDEX SCORE: 79.17
GAIT WITH VERTICAL HEAD TURNS: MILD IMPAIRMENT: PERFORMS HEAD TURNS SMOOTHLY WITH SLIGHT CHANGE IN GAIT VELOCITY, IE, MINOR DISRUPTION TO SMOOTH GAIT PATH OR USES WALKING AID
GAIT LEVEL SURFACE: NORMAL: WALKS 20', NO ASSISTIVE DEVICES, GOOD SPEED, NO EVIDENCE FOR IMBALANCE, NORMAL GAIT PATTERN
CHANGE IN GAIT SPEED: MILD IMPAIRMENT: IS ABLE TO CHANGE SPEED BUT DEMONSTRATES MILD GAIT DEVIATIONS, OR NO GAIT DEVIATIONS BUT UNABLE TO ACHIEVE A SIGNIFICANT CHANGE IN VELOCITY, OR USES AN ASSISTIVE DEVICE
STEP OVER OBSTACLE: MILD IMPAIRMENT: IS ABLE TO STEP OVER BOX, BUT MUST SLOW DOWN AND ADJUST TO CLEAR THE BOX SAFELY
STEPS: MILD IMPAIRMENT: ALTERNATING FEET, MUST USE RAIL
GAIT WITH HORIZONTAL HEAD TURNS: MILD IMPAIRMENT: PERFORMS HEAD TURNS SMOOTHLY WITH SLIGHT CHANGE IN GAIT VELOCITY, IE, MINOR DISRUPTION TO SMOOTH GAIT PATH OR USES WALKING AID

## 2020-10-19 ASSESSMENT — ACTIVITIES OF DAILY LIVING (ADL): POOR_BALANCE: 1

## 2020-10-19 NOTE — OP THERAPY EVALUATION
"  Outpatient Physical Therapy  INITIAL EVALUATION    [facility] Therapy  [address]  [place] [address]  Phone:  [phone]  Fax:  [phone]    Date of Evaluation: 10/19/2020    Patient: Laurent Infante  YOB: 1940  MRN: [id number]     Referring Provider: Roxy Ramon M.D.  [address]  [address]  [place] [address]   Referring Diagnosis Gait abnormality [R26.9];Imbalance [R26.89]     Time Calculation    Start time: 1405  Stop time: 1455 Time Calculation (min): 50 minutes             Chief Complaint: Loss Of Balance    Visit Diagnoses     ICD-10-CM   1. Gait abnormality  R26.9   2. Imbalance  R26.89         Subjective   History of Present Illness:     Date of onset:  4/19/2020    History of chief complaint:  Patient is a 80 year old male who presents to physical therapy for evaluation and treatment of gait and balance instability. Patient reports that approximately 3 years ago he began noticing changes in his posture and gait and has been \"shuffling\" more over the last couple of years. Patient states that he has had 3-4 falls over the last 6 months, but not yet suffered any serious injury. States that falls have been like \"tripping\". Towards the end of the day patient states he feels \"weak\" if standing for too long.     Patient active in walking, gardening, playing with his dog/walking the dog. Uses Pedometer to measures steps with a goal of 12,000 daily.     Prior level of function:  Independent in all activities without limitationss.    Pain:     Pain comments:  Patient reports no significant pain and states that pain is not a functional concern at this time.     Social Support:     Lives in:  One-story house    Lives with:  Spouse    Hand Dominance:     Hand dominance:  Left    Treatments:     Treatments to date:  None    Patient Goals:     Patient goals for therapy:  \"stop falling\"      Past Medical History:   Diagnosis Date   • " Bipolar 1 disorder (HCC)    • Depression      Past Surgical History:   Procedure Laterality Date   • DENTAL EXTRACTION(S)     • HERNIA REPAIR     • SHOULDER SURGERY      bilateral rotator cuff repair       Precautions:       Objective   Observation and functional movement:  Patient with fair posture in sitting and standing with mild forward head, rounded shoulders, increased trunk flexion and forward lean.     Range of motion and strength:    Active range of motion is within functional limits.    Bilateral LE strength grossly rated as 4+/5 with exception of bilateral hip abduction rated as 4/5  UE strength WFL  Gross postural strength rated as 4/5    Sensation and reflexes:     Sensation is intact.      Reflexes are normal and symmetrical.    Palpation and joint mobility:     No tenderness to palpation noted.    Mild hypomobility in thoracic and lumbar spine with PA and unilateral mobs.     Special tests:      5x repeated STS 13.6 seconds, consistent use of B LE braced against chair for balance  TUG 11.8 seconds  Tandem stance 3 seconds  DLS on solid: 30seconds  DLS on solid with EC: 30 seconds  DLS on foam with EO: 30 seconds with moderate sway  DLS on foam with EC: 20 seconds prior to step and UE support needed    Balance:     Sitting (static): Normal    Sitting (dynamic): Normal    Standing (static): Good    Standing (dynamic): Fair    Gait:      Decreased step height, step and stride length, forward lean with increased trunk flexion.     Basic self care and IADL's:     Independent with all self care.    Independent with all ADL's.    Cognition and visual perception:     No cognition deficits noted.    No visual perception deficits noted.            Therapeutic Exercises (CPT 14342):     1. Repeated STS, [id number]    2. Mid rows, x20 elbows bent, elbows straight      Time-based treatments/modalities:    Physical Therapy Timed Treatment Charges  Therapeutic exercise minutes (CPT 56212): 12 minutes      Assessment,  Response and Plan:   Impairments: abnormal gait, impaired balance, impaired physical strength and lacks appropriate home exercise program    Assessment details:  Patient is a 80 year old male who presents to physical therapy for evaluation and treatment of gait and balance instability and history of 4 falls over the last 4-6 months. Patient presents with deficits including poor postural awareness and control, decreased postural and LE strength, and balance deficits with narrow INNA, on uneven/varying surfaces and in vision reduced tasks. Functional test scores in Murray, DGI, TUG and repeated STS place patient at increased risk for falls. Patient would benefit from skilled therapy services to address deficits seen during evaluation to allow for decreased risk for falls and injury and return to PLOF.   Barriers to therapy:  Age  Prognosis: good    Goals:   Short Term Goals:   1. Establish initial HEP  2. Report no falls over 2 week time frame  3. Demonstrate TUG in less than 10 seconds  Short term goal time span:  2-4 weeks      Long Term Goals:    1. Independent and compliant in HEP and progressions  2. Murray Balance score of 51 or higher to show decreased fall risk  3. DGI score of minimum 21/24  4. Demonstrate good posture in sitting, standing and dynamic activities to reduce fall risk.   Long term goal time span:  6-8 weeks    Plan:   Therapy options:  Physical therapy treatment to continue  Planned therapy interventions:  Therapeutic Exercise (CPT 01675), Therapeutic Activities (CPT 65527), Neuromuscular Re-education (CPT 29748) and Manual Therapy (CPT 44840)  Frequency:  2x week  Duration in weeks:  6  Duration in visits:  12  Discussed with:  Patient      Functional Assessment Used  Murray Balance Total Score (0-56): 47  Dynamic Gait Index Total Score: 79.17     Referring provider co-signature:  I have reviewed this plan of care and my co-signature certifies the need for services.    Certification Period: 10/19/2020  to  12/21/20    Physician Signature: ________________________________ Date: ______________

## 2020-10-26 ENCOUNTER — PHYSICAL THERAPY (OUTPATIENT)
Dept: PHYSICAL THERAPY | Facility: MEDICAL CENTER | Age: 80
End: 2020-10-26
Attending: FAMILY MEDICINE
Payer: MEDICARE

## 2020-10-26 DIAGNOSIS — R26.9 GAIT ABNORMALITY: ICD-10-CM

## 2020-10-26 DIAGNOSIS — R26.89 IMBALANCE: ICD-10-CM

## 2020-10-26 PROCEDURE — 97112 NEUROMUSCULAR REEDUCATION: CPT

## 2020-10-26 PROCEDURE — 97110 THERAPEUTIC EXERCISES: CPT

## 2020-10-26 NOTE — OP THERAPY DAILY TREATMENT
Outpatient Physical Therapy  DAILY TREATMENT     [facility] Outpatient Physical Therapy  [address]  [place] [address]  Phone:  [phone]  Fax:  [phone]    Date: 10/26/2020    Patient: Laurent Infante  YOB: 1940  MRN: [id number]     Time Calculation    Start time: 1400  Stop time: 1435 Time Calculation (min): 35 minutes         Chief Complaint: Loss Of Balance    Visit #: 2    SUBJECTIVE:  Patient reports compliance with initial HEP and presents for continued treatment secondary to history of falls.     OBJECTIVE:  Current objective measures:         Therapeutic Exercises (CPT 46517):     1. Repeated STS, [id number]    2. Mid rows, x20 elbows bent, elbows straight    3. Pallof press, [id number], bilaterally    4. Retro stepping, 3steps x10 with blue tubing    5. Lateral stepping, 3 steps x10 blue tubing    Therapeutic Treatments and Modalities:     1. Neuromuscular Re-education (CPT 51227), see below    Therapeutic Treatment and Modalities Summary: Diagonal stepping with forward progression 30'x4  Forward/retro stepping with lateral progression 30'x4  Semi tandem walking 30'x4    Time-based treatments/modalities:    Physical Therapy Timed Treatment Charges  Neuromusc re-ed, balance, coor, post minutes (CPT 94018): 10 minutes  Therapeutic exercise minutes (CPT 34388): 20 minutes          ASSESSMENT:   Response to treatment: Patient with good response to treatment. Required 3 seated rest breaks during exercise due to fatigue. CGA-SBA during neuro re-ed due to high fall risk. Progress as tolerated.     PLAN/RECOMMENDATIONS:   Plan for treatment: therapy treatment to continue next visit.  Planned interventions for next visit: continue with current treatment.

## 2020-11-02 ENCOUNTER — PHYSICAL THERAPY (OUTPATIENT)
Dept: PHYSICAL THERAPY | Facility: MEDICAL CENTER | Age: 80
End: 2020-11-02
Attending: FAMILY MEDICINE
Payer: MEDICARE

## 2020-11-02 DIAGNOSIS — R26.89 IMBALANCE: ICD-10-CM

## 2020-11-02 DIAGNOSIS — R26.9 GAIT ABNORMALITY: ICD-10-CM

## 2020-11-02 PROCEDURE — 97110 THERAPEUTIC EXERCISES: CPT

## 2020-11-02 PROCEDURE — 97112 NEUROMUSCULAR REEDUCATION: CPT

## 2020-11-02 NOTE — OP THERAPY DAILY TREATMENT
Outpatient Physical Therapy  DAILY TREATMENT     [facility] Outpatient Physical Therapy  [address]  [place] [address]  Phone:  [phone]  Fax:  [phone]    Date: 11/02/2020    Patient: Laurent Infante  YOB: 1940  MRN: [id number]     Time Calculation    Start time: 1400  Stop time: 1445 Time Calculation (min): 45 minutes         Chief Complaint: Loss Of Balance    Visit #: 3    SUBJECTIVE:  No new complaints. Went for a hike with my wife on Saturday and did not have any falls or significant LOB.     OBJECTIVE:  Current objective measures:         Therapeutic Exercises (CPT 89838):     1. Repeated STS, [id number]    2. Mid rows, high rows, x20 elbows bent, elbows straight    3. Pallof press, [id number], bilaterally    4. 1/2 foam roller with alternating UEs and angels, x20 each    Therapeutic Treatments and Modalities:     1. Neuromuscular Re-education (CPT 98903), see below    Therapeutic Treatment and Modalities Summary: Diagonal stepping with forward progression 30'x4  Forward/retro stepping with lateral progression 30'x4  Tandem walking, 30'x6 with UE support on walking stick  [place], 10'x10    Time-based treatments/modalities:    Physical Therapy Timed Treatment Charges  Neuromusc re-ed, balance, coor, post minutes (CPT 51350): 23 minutes  Therapeutic exercise minutes (CPT 73353): 18 minutes      Pain rating (1-10) before treatment:  0  Pain rating (1-10) after treatment:  0    ASSESSMENT:   Response to treatment: Patient showing improved postural control in static and dynamic settings. Progress as tolerated.     PLAN/RECOMMENDATIONS:   Plan for treatment: therapy treatment to continue next visit.  Planned interventions for next visit: continue with current treatment.

## 2020-11-09 ENCOUNTER — PHYSICAL THERAPY (OUTPATIENT)
Dept: PHYSICAL THERAPY | Facility: MEDICAL CENTER | Age: 80
End: 2020-11-09
Attending: FAMILY MEDICINE
Payer: MEDICARE

## 2020-11-09 DIAGNOSIS — R26.89 IMBALANCE: ICD-10-CM

## 2020-11-09 DIAGNOSIS — R26.9 GAIT ABNORMALITY: ICD-10-CM

## 2020-11-09 PROCEDURE — 97112 NEUROMUSCULAR REEDUCATION: CPT

## 2020-11-09 PROCEDURE — 97110 THERAPEUTIC EXERCISES: CPT

## 2020-11-09 NOTE — OP THERAPY DAILY TREATMENT
Outpatient Physical Therapy  DAILY TREATMENT     [facility] Outpatient Physical Therapy  [address]  [place] [address]  Phone:  [phone]  Fax:  [phone]    Date: 11/09/2020    Patient: Laurent Infante  YOB: 1940  MRN: [id number]     Time Calculation    Start time: 1400  Stop time: 1442 Time Calculation (min): 42 minutes         Chief Complaint: Loss Of Balance    Visit #: 4    SUBJECTIVE:  Patient reports no falls since beginning therapy. No new complaints.     OBJECTIVE:  Current objective measures:         Therapeutic Exercises (CPT 69550):     2. Lateral and retro stepping with resistance, 2 steps, x20, blue band    3. Pallof press, [id number], bilaterally    4. 1/2 foam roller with alternating UEs and angels, x20 each    Therapeutic Treatments and Modalities:     1. Neuromuscular Re-education (CPT 47124), see below    Therapeutic Treatment and Modalities Summary: Obstacle course stepping over/around obstacles, up/down steps, on/off foam  Pushing weight ball down line, SL push, 30'x3 forward and lateral   Walking on foam, forward and lateral 10'x8    Time-based treatments/modalities:    Physical Therapy Timed Treatment Charges  Neuromusc re-ed, balance, coor, post minutes (CPT 55281): 25 minutes  Therapeutic exercise minutes (CPT 76227): 15 minutes      Pain rating (1-10) before treatment:  0  Pain rating (1-10) after treatment:  0    ASSESSMENT:   Response to treatment: Patient demonstrating improved postural control in static and dynamic situations. Reports no falls since initiating therapy. Progress as tolerated.     PLAN/RECOMMENDATIONS:   Plan for treatment: therapy treatment to continue next visit.  Planned interventions for next visit: continue with current treatment.

## 2020-11-16 ENCOUNTER — PHYSICAL THERAPY (OUTPATIENT)
Dept: PHYSICAL THERAPY | Facility: MEDICAL CENTER | Age: 80
End: 2020-11-16
Attending: FAMILY MEDICINE
Payer: MEDICARE

## 2020-11-16 DIAGNOSIS — R26.9 GAIT ABNORMALITY: ICD-10-CM

## 2020-11-16 DIAGNOSIS — R26.89 IMBALANCE: ICD-10-CM

## 2020-11-16 PROCEDURE — 97530 THERAPEUTIC ACTIVITIES: CPT

## 2020-11-16 NOTE — OP THERAPY DAILY TREATMENT
Outpatient Physical Therapy  DAILY TREATMENT     [facility] Outpatient Physical Therapy  [address]  [place] [address]  Phone:  [phone]  Fax:  [phone]    Date: 11/16/2020    Patient: Laurent Infante  YOB: 1940  MRN: [id number]     Time Calculation    Start time: 1400  Stop time: 1445 Time Calculation (min): 45 minutes         Chief Complaint: Loss Of Balance    Visit #: 5    SUBJECTIVE:  Patient reports he was able to go hiking with his wife over the weekend without any difficulty.    OBJECTIVE:  Current objective measures:   5x repeated STS 9.6 seconds  30 second repeated STS 15 reps  TUG 6.4 seconds  Murray Balance 55/56  DGI 22/24  Tinetti Gait and Balance 27/28          Exercises/Treatment  Time-based treatments/modalities:    Physical Therapy Timed Treatment Charges  Therapeutic activity minutes (CPT 68264): 40 minutes      Pain rating (1-10) before treatment:  0  Pain rating (1-10) after treatment:  0    ASSESSMENT:   Response to treatment: Patient showing significant progress in all measured areas and improving functional test scores. Progressing towards all established goals. Patient to return for 1 additional appointment for progressions and to finalize HEP with DC following.     PLAN/RECOMMENDATIONS:   Plan for treatment: therapy treatment to continue next visit.  Planned interventions for next visit: continue with current treatment.

## 2020-11-23 ENCOUNTER — APPOINTMENT (OUTPATIENT)
Dept: PHYSICAL THERAPY | Facility: MEDICAL CENTER | Age: 80
End: 2020-11-23
Attending: FAMILY MEDICINE
Payer: MEDICARE

## 2020-12-01 ENCOUNTER — OFFICE VISIT (OUTPATIENT)
Dept: MEDICAL GROUP | Facility: MEDICAL CENTER | Age: 80
End: 2020-12-01
Payer: MEDICARE

## 2020-12-01 VITALS
BODY MASS INDEX: 26.05 KG/M2 | DIASTOLIC BLOOD PRESSURE: 68 MMHG | HEIGHT: 70 IN | HEART RATE: 65 BPM | OXYGEN SATURATION: 99 % | SYSTOLIC BLOOD PRESSURE: 118 MMHG | WEIGHT: 182 LBS | TEMPERATURE: 98.2 F

## 2020-12-01 DIAGNOSIS — R20.0 NUMBNESS AND TINGLING OF RIGHT ARM: ICD-10-CM

## 2020-12-01 DIAGNOSIS — H53.9 VISION CHANGES: ICD-10-CM

## 2020-12-01 DIAGNOSIS — R20.2 NUMBNESS AND TINGLING OF RIGHT ARM: ICD-10-CM

## 2020-12-01 DIAGNOSIS — Z23 NEED FOR VACCINATION: ICD-10-CM

## 2020-12-01 DIAGNOSIS — H90.3 SENSORINEURAL HEARING LOSS (SNHL) OF BOTH EARS: ICD-10-CM

## 2020-12-01 DIAGNOSIS — R97.20 ELEVATED PSA: ICD-10-CM

## 2020-12-01 PROCEDURE — 99214 OFFICE O/P EST MOD 30 MIN: CPT | Mod: 25 | Performed by: FAMILY MEDICINE

## 2020-12-01 PROCEDURE — G0008 ADMIN INFLUENZA VIRUS VAC: HCPCS | Performed by: FAMILY MEDICINE

## 2020-12-01 PROCEDURE — 90662 IIV NO PRSV INCREASED AG IM: CPT | Performed by: FAMILY MEDICINE

## 2020-12-01 ASSESSMENT — FIBROSIS 4 INDEX: FIB4 SCORE: 1.14

## 2020-12-02 ENCOUNTER — PHYSICAL THERAPY (OUTPATIENT)
Dept: PHYSICAL THERAPY | Facility: MEDICAL CENTER | Age: 80
End: 2020-12-02
Attending: FAMILY MEDICINE
Payer: MEDICARE

## 2020-12-02 DIAGNOSIS — R26.89 IMBALANCE: ICD-10-CM

## 2020-12-02 DIAGNOSIS — R26.9 GAIT ABNORMALITY: ICD-10-CM

## 2020-12-02 PROCEDURE — 97112 NEUROMUSCULAR REEDUCATION: CPT

## 2020-12-02 PROCEDURE — 97110 THERAPEUTIC EXERCISES: CPT

## 2020-12-02 NOTE — OP THERAPY DAILY TREATMENT
Outpatient Physical Therapy  DAILY TREATMENT     [facility] Outpatient Physical Therapy  [address]  [place] [address]  Phone:  [phone]  Fax:  [phone]    Date: 12/02/2020    Patient: Laurent Infante  YOB: 1940  MRN: [id number]     Time Calculation    Start time: 0900  Stop time: 0932 Time Calculation (min): 32 minutes         Chief Complaint: Loss Of Balance    Visit #: 6    SUBJECTIVE:  Patient reports no falls and states that he is feeling stronger and more balanced.    OBJECTIVE:  Current objective measures: 5x repeated STS 9.6 seconds  30 second repeated STS 15 reps  TUG 6.4 seconds  Murray Balance 55/56  DGI 22/24            Therapeutic Exercises (CPT 57723):     1. HEP progressions    2. Lateral and retro stepping with resistance, 2 steps, x20, blue band    3. Pallof press, [id number], bilaterally    4. 1/2 foam roller with alternating UEs and angels, x20 each    Therapeutic Treatments and Modalities:     1. Neuromuscular Re-education (CPT 85150), see below, HEP progressions    Therapeutic Treatment and Modalities Summary: Obstacle course stepping over/around obstacles, up/down steps, on/off foam  Pushing weight ball down line, SL push, 30'x3 forward and lateral   Walking on foam, forward and lateral 10'x8    Time-based treatments/modalities:    Physical Therapy Timed Treatment Charges  Neuromusc re-ed, balance, coor, post minutes (CPT 55474): 15 minutes  Therapeutic exercise minutes (CPT 49071): 15 minutes      Pain rating (1-10) before treatment:  0  Pain rating (1-10) after treatment:  0    ASSESSMENT:   Response to treatment: Patient has tolerated treatment well and progressed in all measured areas. Functional test scores in Murray Balance, TUG, and repeated STS all WFL and show low risk for falls. Patient has met established goals and is ready for DC from skilled treatment.     PLAN/RECOMMENDATIONS:   Plan for treatment: discharge patient due to  accomplished goals.

## 2020-12-02 NOTE — OP THERAPY DISCHARGE SUMMARY
Outpatient Physical Therapy  DISCHARGE SUMMARY NOTE      [facility] Outpatient Physical Therapy  [address]  [place] [address]  Phone:  [phone]  Fax:  [phone]    Date of Visit: 12/02/2020    Patient: Laurent Infante  YOB: 1940  MRN: [id number]     Referring Provider: Roxy Ramon M.D.  [address]  [address]  [place] [address]   Referring Diagnosis Unspecified abnormalities of gait and mobility [R26.9];Other abnormalities of gait and mobility [R26.89]         Functional Assessment Used        Your patient is being discharged from Physical Therapy with the following comments:   · Goals met    Comments:All following goals as established at evaluation have been met.   Short Term Goals:   1. Establish initial HEP  2. Report no falls over 2 week time frame  3. Demonstrate TUG in less than 10 seconds  Short term goal time span:  2-4 weeks      Long Term Goals:    1. Independent and compliant in HEP and progressions  2. Murray Balance score of 51 or higher to show decreased fall risk  3. DGI score of minimum 21/24  4. Demonstrate good posture in sitting, standing and dynamic activities to reduce fall risk.     Limitations Remaining:  Patient reporting no significant limitations at this time    Recommendations:  Continue with HEP and progress independently as able. Follow up with MD as needed.     Amauri Barrera, PT, DPT    Date: 12/2/2020

## 2020-12-02 NOTE — ASSESSMENT & PLAN NOTE
Chornic problem. Last PSA about 1.5 yrs ago was 6.0  Has had biopsy about 10 yrs ago,   Has not established with uro here in [place].
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
## 2020-12-16 ENCOUNTER — HOSPITAL ENCOUNTER (OUTPATIENT)
Facility: MEDICAL CENTER | Age: 80
End: 2020-12-16
Attending: UROLOGY
Payer: MEDICARE

## 2020-12-16 PROCEDURE — 84153 ASSAY OF PSA TOTAL: CPT

## 2020-12-17 LAB — PSA SERPL-MCNC: 17.3 NG/ML (ref 0–4)

## 2021-01-07 ENCOUNTER — OFFICE VISIT (OUTPATIENT)
Dept: MEDICAL GROUP | Facility: MEDICAL CENTER | Age: 81
End: 2021-01-07
Payer: MEDICARE

## 2021-01-07 ENCOUNTER — HOSPITAL ENCOUNTER (OUTPATIENT)
Dept: LAB | Facility: MEDICAL CENTER | Age: 81
End: 2021-01-07
Attending: FAMILY MEDICINE
Payer: MEDICARE

## 2021-01-07 VITALS
TEMPERATURE: 97.7 F | DIASTOLIC BLOOD PRESSURE: 72 MMHG | BODY MASS INDEX: 26.2 KG/M2 | WEIGHT: 183 LBS | OXYGEN SATURATION: 96 % | HEART RATE: 70 BPM | HEIGHT: 70 IN | SYSTOLIC BLOOD PRESSURE: 116 MMHG

## 2021-01-07 DIAGNOSIS — M79.605 LEFT LEG PAIN: ICD-10-CM

## 2021-01-07 DIAGNOSIS — Z23 NEED FOR VACCINATION: ICD-10-CM

## 2021-01-07 DIAGNOSIS — I80.03 THROMBOPHLEBITIS OF SUPERFICIAL VEINS OF BOTH LOWER EXTREMITIES: ICD-10-CM

## 2021-01-07 PROBLEM — M79.604 RIGHT LEG PAIN: Status: ACTIVE | Noted: 2021-01-07

## 2021-01-07 LAB — D DIMER PPP IA.FEU-MCNC: 1.42 UG/ML (FEU) (ref 0–0.5)

## 2021-01-07 PROCEDURE — 99214 OFFICE O/P EST MOD 30 MIN: CPT | Mod: 25 | Performed by: FAMILY MEDICINE

## 2021-01-07 PROCEDURE — 90750 HZV VACC RECOMBINANT IM: CPT | Performed by: FAMILY MEDICINE

## 2021-01-07 PROCEDURE — 36415 COLL VENOUS BLD VENIPUNCTURE: CPT

## 2021-01-07 PROCEDURE — 85379 FIBRIN DEGRADATION QUANT: CPT

## 2021-01-07 PROCEDURE — 90471 IMMUNIZATION ADMIN: CPT | Performed by: FAMILY MEDICINE

## 2021-01-07 RX ORDER — DIVALPROEX SODIUM 500 MG/1
TABLET, EXTENDED RELEASE ORAL
COMMUNITY
Start: 2020-12-18 | End: 2021-01-18

## 2021-01-07 RX ORDER — AZITHROMYCIN 250 MG/1
TABLET, FILM COATED ORAL
COMMUNITY
End: 2021-03-01

## 2021-01-07 ASSESSMENT — FIBROSIS 4 INDEX: FIB4 SCORE: 1.14

## 2021-01-07 NOTE — ASSESSMENT & PLAN NOTE
New problem.   About 3 weeks ago he awoke with pain and swelling in the left leg, starting in the thigh, extending to this lower leg.   Decreased ROM of knee due to it feeling swollen, no noticeable swelling  No injury or fall.   No bruising.  Has been using arnica, asa and aleve which has all be helpful.   Pain recurs after walking.   Would like to see vein specialist.   No fevers or chills  Thrombosed veins not currently painful.

## 2021-01-07 NOTE — PROGRESS NOTES
Subjective:     CC: Diagnoses of Left leg pain, Thrombophlebitis of superficial veins of both lower extremities, and Need for vaccination were pertinent to this visit.    HPI: Patient is a 80 y.o. male established patient who presents today with the following concern.       Left leg pain  New problem.   About 3 weeks ago he awoke with pain and swelling in the left leg, starting in the thigh, extending to this lower leg.   Decreased ROM of knee due to it feeling swollen, no noticeable swelling  No injury or fall.   No bruising.  Has been using arnica, asa and aleve which has all be helpful.   Pain recurs after walking.   Would like to see vein specialist.   No fevers or chills  Has h/o superficial thrombophlebitis, those veins not currently painful.         Past Medical History:   Diagnosis Date   • Bipolar 1 disorder (HCC)    • Depression        Social History     Tobacco Use   • Smoking status: Never Smoker   • Smokeless tobacco: Never Used   Substance Use Topics   • Alcohol use: Yes     Comment: occasionally   • Drug use: Never       Current Outpatient Medications Ordered in Epic   Medication Sig Dispense Refill   • Mirabegron ER 25 MG TABLET SR 24 HR Myrbetriq 25 mg tablet,extended release   Take 1 tablet every day by oral route for 30 days.     • omeprazole (PRILOSEC) 20 MG delayed-release capsule TAKE 1 CAPSULE BY MOUTH EVERY DAY 90 Cap 0   • fluticasone (FLONASE) 50 MCG/ACT nasal spray SPRAY 1 SPRAY IN NOSE EVERY DAY. 48 mL 0   • divalproex ER (DEPAKOTE ER) 500 MG TABLET SR 24 HR Take 1,000 mg by mouth every morning. Two tabs in am     • azithromycin (ZITHROMAX) 250 MG Tab azithromycin 250 mg tablet   TAKE 2 TABLETS BY MOUTH TODAY, THEN TAKE 1 TABLET DAILY FOR 4 DAYS     • divalproex ER (DEPAKOTE ER) 500 MG TABLET SR 24 HR TAKE 2 TABLETS (1,000MG) ORALLY EVERY DAY FOR 30 DAYS     • divalproex ER (DEPAKOTE ER) 500 MG TABLET SR 24 HR TAKE 2 TABLETS (1,000MG) ORALLY EVERY DAY FOR 30 DAYS     • meloxicam (MOBIC)  "15 MG tablet TAKE 1 TABLET BY MOUTH EVERY DAY (Patient not taking: Reported on 1/7/2021) 15 Tab 0     No current Epic-ordered facility-administered medications on file.        Allergies:  Patient has no known allergies.    Health Maintenance: Completed    ROS:  Gen: no fevers/chill, no changes in weight  Eyes: no changes in vision  ENT: no sore throat, no hearing loss, no bloody nose  Pulm: no sob, no cough  CV: no chest pain, no palpitations  GI: no nausea/vomiting, no diarrhea  : no dysuria  MSk: no myalgias  Skin: no rash  Neuro: no headaches, no numbness/tingling  Heme/Lymph: no easy bruising      Objective:       Exam:  /72 (BP Location: Right arm, Patient Position: Sitting, BP Cuff Size: Adult long)   Pulse 70   Temp 36.5 °C (97.7 °F) (Temporal)   Ht 1.778 m (5' 10\")   Wt 83 kg (183 lb)   SpO2 96%   BMI 26.26 kg/m²  Body mass index is 26.26 kg/m².    General: Normal appearing. No distress.  HEAD: NCAT  EYES: conjunctiva clear, lids without ptosis, pupils equal and reactive to light  EARS: ears normal shape and contour.  MOUTH: normal dentition   Neck:  Normal ROM  Pulmonary: Normal effort. Normal respiratory rate.  Cardiovascular: Well perfused. RRR.   Neurologic: Grossly normal, no focal deficits  Skin: Warm and dry. Thrombosed superficial veins bilaterly. No edema. Tender to palpation in the inner upper thigh,   Musculoskeletal: Normal gait and station.   Psych: Normal mood and affect. Alert and oriented x3. Judgment and insight is normal.      Labs: 12/16/20 Results reviewed and discussed with the patient, questions answered.    Assessment & Plan:     80 y.o. male with the following -     1. Left leg pain  New problem. 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  These have been present for 40 years or so.  However, he now has new left-sided leg pain, tender to palpation in the medial thigh.  Order placed for D-dimer, ultrasound order " placed as well to rule out DVT.  Otherwise, we will plan to refer to vascular surgery to review options for these thrombosed superficial veins.  I will contact the patient as soon as we have results from blood and imaging testing.  - US-EXTREMITY VENOUS LOWER BILAT; Future  - D-DIMER; Future  - REFERRAL TO VASCULAR SURGERY    2. 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 Need for vaccination  - Shingrix Vaccine    Return if symptoms worsen or fail to improve.    Please note that this dictation was created using voice recognition software. I have made every reasonable attempt to correct obvious errors, but I expect that there are errors of grammar and possibly content that I did not discover before finalizing the note.

## 2021-01-08 ENCOUNTER — HOSPITAL ENCOUNTER (OUTPATIENT)
Dept: RADIOLOGY | Facility: MEDICAL CENTER | Age: 81
End: 2021-01-08
Attending: FAMILY MEDICINE
Payer: MEDICARE

## 2021-01-08 DIAGNOSIS — I82.4Y2 ACUTE DEEP VEIN THROMBOSIS (DVT) OF PROXIMAL VEIN OF LEFT LOWER EXTREMITY (HCC): ICD-10-CM

## 2021-01-08 DIAGNOSIS — I82.4Z9 ACUTE DEEP VEIN THROMBOSIS (DVT) OF DISTAL VEIN OF LOWER EXTREMITY, UNSPECIFIED LATERALITY (HCC): ICD-10-CM

## 2021-01-08 DIAGNOSIS — M79.605 LEFT LEG PAIN: ICD-10-CM

## 2021-01-08 PROCEDURE — 93970 EXTREMITY STUDY: CPT

## 2021-01-09 NOTE — PROGRESS NOTES
Called received from pager, pt's phone is out of service. The caller mentioned that pt would like to switch eliquis to possibly xarelto which was ordered.   Again could not reach pt with multiple trial.

## 2021-01-11 ENCOUNTER — TELEPHONE (OUTPATIENT)
Dept: VASCULAR LAB | Facility: MEDICAL CENTER | Age: 81
End: 2021-01-11

## 2021-01-11 DIAGNOSIS — Z23 NEED FOR VACCINATION: ICD-10-CM

## 2021-01-11 NOTE — TELEPHONE ENCOUNTER
[facility]    Received anticoagulation referral for acute DVT from Dr. Ramon on 1-7-21    Attempted to reach patient, no answer, VM box is full.  Will attempt to reach at a later time.    Insurance: Medicare  PCP: Roxy Ramon  Locations to be seen: [facility] or [facility] at [phone], fax [phone]    Ravinder Guy, PharmD, BCACP

## 2021-01-13 ENCOUNTER — TELEPHONE (OUTPATIENT)
Dept: VASCULAR LAB | Facility: MEDICAL CENTER | Age: 81
End: 2021-01-13

## 2021-01-14 ENCOUNTER — TELEPHONE (OUTPATIENT)
Dept: VASCULAR LAB | Facility: MEDICAL CENTER | Age: 81
End: 2021-01-14

## 2021-01-14 NOTE — TELEPHONE ENCOUNTER
Left voicemail message for patient to return call to [facility] to establish care  VALE Solis, Clinical Pharmacist, CDE, CACP

## 2021-01-18 ENCOUNTER — ANTICOAGULATION VISIT (OUTPATIENT)
Dept: MEDICAL GROUP | Facility: MEDICAL CENTER | Age: 81
End: 2021-01-18
Payer: MEDICARE

## 2021-01-18 DIAGNOSIS — I82.402 ACUTE DEEP VEIN THROMBOSIS (DVT) OF LEFT LOWER EXTREMITY, UNSPECIFIED VEIN (HCC): ICD-10-CM

## 2021-01-18 PROBLEM — I82.409 DEEP VEIN THROMBOSIS (HCC): Status: ACTIVE | Noted: 2021-01-18

## 2021-01-18 PROCEDURE — 99211 OFF/OP EST MAY X REQ PHY/QHP: CPT | Performed by: INTERNAL MEDICINE

## 2021-01-18 NOTE — PROGRESS NOTES
NEW DOAC   .  Anticoagulation Patient Findings      PCP: Roxy Ramon M.D.  Cardiologist: none  Dx: Acute LLE DVT, acute LLE SVT  CHADSVASC = n/a  HAS-BLED = 1 (age)  Target End Date = tbd    Pt Hx: Pt has a long-standing hx of bilateral thrombophlebitis for about 40 years. On 1/8, an US was found + for acute LLE DVT and SVT. Pt was then started on Xarelto. This is pt's first VTE.      Labs:  Lab Results   Component Value Date/Time    WBC 6.1 12/09/2019 04:29 PM    RBC 4.40 (L) 12/09/2019 04:29 PM    HEMOGLOBIN 13.6 (L) 12/09/2019 04:29 PM    HEMATOCRIT 40.9 (L) 12/09/2019 04:29 PM    MCV 93.0 12/09/2019 04:29 PM    MCH 30.9 12/09/2019 04:29 PM    MCHC 33.3 (L) 12/09/2019 04:29 PM    MPV 9.6 12/09/2019 04:29 PM    NEUTSPOLYS 48.70 12/09/2019 04:29 PM    LYMPHOCYTES 38.40 12/09/2019 04:29 PM    MONOCYTES 11.60 12/09/2019 04:29 PM    EOSINOPHILS 0.70 12/09/2019 04:29 PM    BASOPHILS 0.30 12/09/2019 04:29 PM      Lab Results   Component Value Date/Time    SODIUM 140 11/11/2019 12:57 PM    POTASSIUM 4.0 11/11/2019 12:57 PM    CHLORIDE 107 11/11/2019 12:57 PM    CO2 26 11/11/2019 12:57 PM    GLUCOSE 89 11/11/2019 12:57 PM    BUN 17 11/11/2019 12:57 PM    CREATININE 0.90 11/11/2019 12:57 PM          Pt is new to Xarelto and new to [facility]. Discussed:   · Indication for DOAC therapy.  · Importance of monitoring and compliance.   · Monitoring parameters, signs and symptoms of bleeding or clotting.  · DOAC therapy, side effects, potential DDIs, OTC medications  · Lifestyle safety, ie smoking, ETOH, hobby safety, fall safety/prevention  · Procedures for missed doses or suspected missed doses, surgeries/procedures, travel, dental work, any medication changes     Start with Xarelto 15mg taken 2 times a day for 21 days and then change to 20mg taken once daily. Pt will transition to 20mg dose on 2/1/2021    DOAC affordable = yes    Samples provided: no    Labs to be completed prior to next f/u - CBC, CMP    F/U - in 6  weeks  Initial vascular appt set for 4/22    Rika Rowell, PharmD    Added [facility] to care team   Send to Bloch

## 2021-01-19 ENCOUNTER — TELEPHONE (OUTPATIENT)
Dept: VASCULAR LAB | Facility: MEDICAL CENTER | Age: 81
End: 2021-01-19

## 2021-01-20 ENCOUNTER — TELEPHONE (OUTPATIENT)
Dept: VASCULAR LAB | Facility: MEDICAL CENTER | Age: 81
End: 2021-01-20

## 2021-01-20 NOTE — TELEPHONE ENCOUNTER
Per Dr. Bloch request - Called pt to attempt to move up [place] appt to next available.     Unable to LVM b/c mailbox was full. I will call back at a later date.

## 2021-01-20 NOTE — TELEPHONE ENCOUNTER
Initial anticoag note and most recent pcp note reviewed.   Patient with no flow through GSV and apparent thrombus extending into junction with deep system.  Anticoag initiated by PCP.  Await vascular medicine consultation to determine LOT. Will need to make sure that patient has not had previous GSV intervention that is being inappropriately read as thrombus.    Will defer any indicated age appropriate screening for occult malignancy to pcp.    Michael Bloch, MD  Vascular Care    Cc: SUDHA Ramon; VITO Loo

## 2021-01-25 ENCOUNTER — TELEPHONE (OUTPATIENT)
Dept: VASCULAR LAB | Facility: MEDICAL CENTER | Age: 81
End: 2021-01-25

## 2021-01-25 DIAGNOSIS — I82.402 ACUTE DEEP VEIN THROMBOSIS (DVT) OF LEFT LOWER EXTREMITY, UNSPECIFIED VEIN (HCC): Primary | ICD-10-CM

## 2021-01-26 NOTE — TELEPHONE ENCOUNTER
Patient contacted clinic with reports of nearly daily nose bleeds shortly after starting Xarelto. He states the intensity and duration of bleeding varies - heavy bleeding event following a 7 mile walk lasted 30 minutes; light bleeding today though this lasted ~1 hour. Pt reports any dizziness/lightheadedness or shortness of breath. Confirmed with pt that he takes as prescribed.    Will have patient switch DOAC therapy to Eliquis at this time. Considering this is pt's first DVT and given how recent this event was, will restart DVT treatment with new medication. Discussed the ADRs to monitor for and report any issues or concerns with clinic.     Start Eliquis 10mg PO in the morning and 10mg PO in the evening for 7 days, then reduce to 5mg PO in the morning and 5mg PO in the evening.     Advised patient to seek emergency services if experiencing any signs/symptoms of clots or bleeding.   Will maintain current follow up and monitoring plan:    2/8/2021 - Initial Vasc + LOT with Dr Barrett  3/1/2021 - Anticoag follow up with pharmacist          Elian Sanders, Dora        CC:  Roxy Ramon M.D.  Jason Crawford, M.D. Michael Bloch, M.D.        1/27/2021    Called patient after discussion with Damian Joseph PharmD. Pt currently denies any recent nose bleeds after switching to Eliquis. Due to concerns of bleeding and that patient had been on higher dose of Xarelto for at least 7 days, will reduce Eliquis to 5mg twice daily and will reassess at follow up appointment.      Elian Sanders, WillieD

## 2021-01-28 ENCOUNTER — IMMUNIZATION (OUTPATIENT)
Dept: FAMILY PLANNING/WOMEN'S HEALTH CLINIC | Facility: IMMUNIZATION CENTER | Age: 81
End: 2021-01-28
Attending: INTERNAL MEDICINE
Payer: MEDICARE

## 2021-01-28 ENCOUNTER — HOSPITAL ENCOUNTER (OUTPATIENT)
Facility: MEDICAL CENTER | Age: 81
End: 2021-01-28
Attending: UROLOGY
Payer: MEDICARE

## 2021-01-28 DIAGNOSIS — Z23 NEED FOR VACCINATION: ICD-10-CM

## 2021-01-28 DIAGNOSIS — Z23 ENCOUNTER FOR VACCINATION: Primary | ICD-10-CM

## 2021-01-28 LAB — PSA SERPL-MCNC: 25.1 NG/ML (ref 0–4)

## 2021-01-28 PROCEDURE — 91301 MODERNA SARS-COV-2 VACCINE: CPT | Performed by: INTERNAL MEDICINE

## 2021-01-28 PROCEDURE — 84153 ASSAY OF PSA TOTAL: CPT

## 2021-01-28 PROCEDURE — 0011A MODERNA SARS-COV-2 VACCINE: CPT | Performed by: INTERNAL MEDICINE

## 2021-02-08 ENCOUNTER — OFFICE VISIT (OUTPATIENT)
Dept: VASCULAR LAB | Facility: MEDICAL CENTER | Age: 81
End: 2021-02-08
Attending: FAMILY MEDICINE
Payer: MEDICARE

## 2021-02-08 VITALS
SYSTOLIC BLOOD PRESSURE: 116 MMHG | BODY MASS INDEX: 26.2 KG/M2 | WEIGHT: 183 LBS | HEIGHT: 70 IN | HEART RATE: 66 BPM | DIASTOLIC BLOOD PRESSURE: 56 MMHG

## 2021-02-08 DIAGNOSIS — Z79.01 CHRONIC ANTICOAGULATION: ICD-10-CM

## 2021-02-08 DIAGNOSIS — R97.20 ELEVATED PSA: ICD-10-CM

## 2021-02-08 DIAGNOSIS — I87.2 CHRONIC VENOUS INSUFFICIENCY: ICD-10-CM

## 2021-02-08 DIAGNOSIS — I82.812 ACUTE SUPERFICIAL VENOUS THROMBOSIS OF LOWER EXTREMITY, LEFT: ICD-10-CM

## 2021-02-08 DIAGNOSIS — I82.412 ACUTE DEEP VEIN THROMBOSIS (DVT) OF FEMORAL VEIN OF LEFT LOWER EXTREMITY (HCC): ICD-10-CM

## 2021-02-08 PROCEDURE — 99204 OFFICE O/P NEW MOD 45 MIN: CPT | Performed by: FAMILY MEDICINE

## 2021-02-08 PROCEDURE — 99212 OFFICE O/P EST SF 10 MIN: CPT

## 2021-02-08 ASSESSMENT — ENCOUNTER SYMPTOMS
FEVER: 0
NAUSEA: 0
WHEEZING: 0
BRUISES/BLEEDS EASILY: 0
WEAKNESS: 0
COUGH: 0
PALPITATIONS: 0
CLAUDICATION: 0
CHILLS: 0
FOCAL WEAKNESS: 0
ABDOMINAL PAIN: 0
SHORTNESS OF BREATH: 0
TREMORS: 0
MYALGIAS: 0
DIARRHEA: 0
SEIZURES: 0
HEADACHES: 0
VOMITING: 0
HEMOPTYSIS: 0
DIZZINESS: 0

## 2021-02-08 ASSESSMENT — FIBROSIS 4 INDEX: FIB4 SCORE: 1.14

## 2021-02-08 NOTE — PROGRESS NOTES
INITIAL VASCULAR ANTICOAGULATION VISIT  Subjective:   Laurent Infante is a 80 y.o. male who presents today 02/08/21 for   Chief Complaint   Patient presents with   • Office Visit     DX:acute DVT, hx of thrombophlebitis     Initially referred by Roxy Ramon M.D. for length of therapy (LOT) determination and management of anticoagulation in context of acute venothromboembolic disease    HPI:    VTE disease / Anticoagulation:   Current symptoms:  Denies current SOB, CP, leg swelling, edema, leg pain, cyanosis of digits, redness of extremities.  Current antithrombotic agent: eliquis 5mg BID   Complications: none, tolerating well, no bleeding or bruising.  Had bleeding from R varicose vein. Stopped spontaneous   Date of initiation of anticoagulation: 1/8/21   Adherence: reports complete, no missed doses      Pertinent VTE pmhx:   Date of Diagnosis: 1/8/21  Type of Venous thromboembolic disease (VTE): acute LLE SVT and DVT at SPJ into CFV  Type of imaging: duplex   Preceding/presenting symptoms: prior to date of dx had LLE pain with swelling that she awoke with acutely from thigh to distal leg.  ROM limited at knee from swelling.  Seen by PCP and had duplex   Antithrombotic therapy at time of VTE event: no  VTE tx course: Eliquis 10mg BID x 7d, now eliquis 5mg BID   Any personal VTE hx? Yes, Details: acute/chronic LLE SVT, varicosities BLE - long-standing   Any family VTE hx? No    UNPROVOKED VS PROVOKED:   Recent surgery ? No  Recent trauma ? No  Smoker?  reports that he has never smoked. He has never used smokeless tobacco.    Extended travel? No  Other periods of immobility? No  Other risk factors for VTE disease:  no  MEN:  Colorectal CA screening:yes              Prostate CA screening: most recently 25, reports many years elevated, seeing urology (Dr. Hagen    Hx of Testosterone therapy: no  Hypercoaguability work-up completed?  no (see assessment for details)    Past Medical History:   Diagnosis Date    • Acute deep vein thrombosis (DVT) of left lower extremity (HCC) 01/2021   • Bipolar 1 disorder (HCC)    • Depression      Past Surgical History:   Procedure Laterality Date   • DENTAL EXTRACTION(S)     • HERNIA REPAIR     • SHOULDER SURGERY      bilateral rotator cuff repair       Current Outpatient Medications:   •  apixaban, 5 mg, Oral, BID, Taking  •  azithromycin, azithromycin 250 mg tablet  TAKE 2 TABLETS BY MOUTH TODAY, THEN TAKE 1 TABLET DAILY FOR 4 DAYS, PRN  •  Mirabegron ER, Myrbetriq 25 mg tablet,extended release  Take 1 tablet every day by oral route for 30 days., Taking  •  divalproex ER, 1,000 mg, Oral, QAM, Taking  •  fluticasone, 1 Spray, Nasal, DAILY (Patient not taking: Reported on 2/8/2021), Not Taking   No Known Allergies  Family History   Problem Relation Age of Onset   • Cancer Mother    • Stroke Father    • Psychiatric Illness Sister      Social History     Tobacco Use   • Smoking status: Never Smoker   • Smokeless tobacco: Never Used   Substance Use Topics   • Alcohol use: Yes     Comment: occasionally   • Drug use: Never       DIET AND EXERCISE:  Weight Change:stable   BMI Readings from Last 5 Encounters:   02/08/21 26.26 kg/m²   01/07/21 26.26 kg/m²   12/01/20 26.11 kg/m²   08/17/20 26.11 kg/m²   08/11/20 26.22 kg/m²     Diet: common adult  Exercise: moderate regular exercise program     Review of Systems   Constitutional: Negative for chills, fever and malaise/fatigue.   Respiratory: Negative for cough, hemoptysis, shortness of breath and wheezing.    Cardiovascular: Negative for chest pain, palpitations, claudication and leg swelling.   Gastrointestinal: Negative for abdominal pain, diarrhea, nausea and vomiting.   Musculoskeletal: Negative for joint pain and myalgias.   Skin: Negative for itching and rash.   Neurological: Negative for dizziness, tremors, focal weakness, seizures, weakness and headaches.   Endo/Heme/Allergies: Does not bruise/bleed easily.        Objective:     Vitals:  "   02/08/21 1338   BP: 116/56   BP Location: Left arm   Patient Position: Sitting   BP Cuff Size: Adult   Pulse: 66   Weight: 83 kg (183 lb)   Height: 1.778 m (5' 10\")      BP Readings from Last 5 Encounters:   02/08/21 116/56   01/07/21 116/72   12/01/20 118/68   08/17/20 106/58   08/11/20 120/70      Body mass index is 26.26 kg/m².  Physical Exam  Vitals signs reviewed.   Constitutional:       Appearance: Normal appearance.   HENT:      Head: Normocephalic and atraumatic.      Nose: Nose normal.      Mouth/Throat:      Mouth: Mucous membranes are moist.      Pharynx: Oropharynx is clear.   Eyes:      Extraocular Movements: Extraocular movements intact.      Conjunctiva/sclera: Conjunctivae normal.   Neck:      Musculoskeletal: Normal range of motion and neck supple.   Cardiovascular:      Rate and Rhythm: Normal rate and regular rhythm.      Pulses: Normal pulses.           Carotid pulses are 2+ on the right side and 2+ on the left side.       Radial pulses are 2+ on the right side and 2+ on the left side.        Dorsalis pedis pulses are 2+ on the right side and 2+ on the left side.        Posterior tibial pulses are 2+ on the right side and 2+ on the left side.      Heart sounds: Normal heart sounds.      Comments:    Spider telangectasia:       RLE:  None      LLE: none   Varicosities:           RLE: none      LLE: none   Corona phlebectatica:      RLE:  None        LLE:  None   Cording:         RLE:  None     LLE: None     Pulmonary:      Effort: Pulmonary effort is normal.      Breath sounds: Normal breath sounds.   Abdominal:      General: Abdomen is flat. Bowel sounds are normal.      Palpations: Abdomen is soft.   Musculoskeletal:      Right lower leg: No edema.      Left lower leg: No edema.   Skin:     General: Skin is warm and dry.      Capillary Refill: Capillary refill takes less than 2 seconds.   Neurological:      General: No focal deficit present.      Mental Status: He is alert and oriented to " person, place, and time. Mental status is at baseline.   Psychiatric:         Mood and Affect: Mood normal.         Behavior: Behavior normal.         Lab Results   Component Value Date    CHOLSTRLTOT 161 11/11/2019    LDL 91 11/11/2019    HDL 57 11/11/2019    TRIGLYCERIDE 65 11/11/2019           Lab Results   Component Value Date    SODIUM 140 11/11/2019    POTASSIUM 4.0 11/11/2019    CHLORIDE 107 11/11/2019    CO2 26 11/11/2019    GLUCOSE 89 11/11/2019    BUN 17 11/11/2019    CREATININE 0.90 11/11/2019    IFAFRICA >60 11/11/2019    IFNOTAFR >60 11/11/2019        Lab Results   Component Value Date    WBC 6.1 12/09/2019    RBC 4.40 (L) 12/09/2019    HEMOGLOBIN 13.6 (L) 12/09/2019    HEMATOCRIT 40.9 (L) 12/09/2019    MCV 93.0 12/09/2019    MCH 30.9 12/09/2019    MCHC 33.3 (L) 12/09/2019    MPV 9.6 12/09/2019         Ref. Range 1/7/2021 15:46   D-Dimer Screen Latest Ref Range: 0.00 - 0.50 ug/mL (FEU) 1.42 (H)   Results for FARHEEN AMBRIZ (MRN [id number]) as of 2/8/2021 13:50   Ref. Range 2/8/2019 13:52 11/11/2019 12:57 8/26/2020 09:51 12/16/2020 15:56 1/28/2021 15:20   Prostatic Specific Antigen Tot Latest Ref Range: 0.00 - 4.00 ng/mL 6.00 (H)   17.30 (H) 25.10 (H)     VASCULAR IMAGING:     Last EKG:   No results found for this or any previous visit.     BLE venous 1/8/21   1. Acute DVT in the LEFT saphenofemoral junction, extending into the common    femoral vein.   2. Acute SVT from the LEFT ankle to thigh.   3. No RIGHT DVT or SVT.    Medical Decision Making:  Today's Assessment / Status / Plan:     1. Acute deep vein thrombosis (DVT) of femoral vein of left lower extremity (HCC)     2. Acute superficial venous thrombosis of lower extremity, left     3. Chronic venous insufficiency     4. Chronic anticoagulation     5. Elevated PSA       PATIENT TYPE: Primary Prevention    Etiology of Established CVD if Present:   1) acute LLE DVT, SVT, 1/2021     ANTITHROMBOTIC THERAPY:  Anti-Platelet/Anti-Coagulant Tx  recommended: yes  Indication: acute LLE DVT, SVT, 2021   Date of initiation: 21  HAS-BLED bleeding risk calc ([email or web address]): 1 pt, 3.4%, low risk  Thrombophilia/hypercoag evaluation:  not recommended  Factors to consider for indefinite OAC: Unprovoked VTE event and Male sex   Last CBC, BMP: 2019  Expected duration: reviewed standard of care as per ACCP AT10 guidelines (2016) is 3-6 months minimum on full dose OAC.  After review of risks/benefits/alternatives, through shared decision-making, we will continue full dose OAC, review extension to 6mo at next visit.  Based upon current factors including male gender, extensive unprovoked VTE, age, baseline varicosities with hx of SVT will strongly consider indefinite therapy at future visits.  Elevated PSA and any dx of prostate CA will also factor into this, so will need further input from Urology, as active malignancy and worsening stage will further increase risk of recurrent VTE if off OAC.   No strong drug-drug interactions with depakote or mirabegron as no effect on CY.   Antithrombotic therapy plan:  - continue eliquis 5mg BID through April, then likely extend to full 6-month  - stressed strict adherence to tx and avoid early termination due to increased risk for recurrent VTE  - check CBC, BMP (CMP if any underlying liver disease), and monitor CrCl as needed Q6mo while on DOAC  - counseled on signs and symptoms of acute VTE that require seeking prompt attention in the ED to include shortness of breath, chest pain, pain with deep inhalation, acute leg swelling and/or pain in calf or leg   - elevate legs as much as possible, use compression stockings/socks if directed by your provider  - Avoid hormonal therapies including estrogen or testosterone-containing meds, or raloxifene or tamoxifene (commonly used for osteoporosis)  - Avoid sedentary periods  - continue complete avoidance of tobacco products  - if having any invasive procedure,please make sure the  doctor knows of your history of blood clots and current anticoagulation status  - avoid Aspirin and anti-inflammatories (eg. Advil, ibuprofen, Aleve, naproxen, etc) while anticoagulated   - avoid skiing or other dangerous activities to reduce risk of head injury and brain bleeds  - recommended to see your PCP to discuss if you need age-appropriate cancer screenings as a small % of blood clots may be caused by an underlying malignancy  - if any bleeding lasting 30min without stopping, please seek care with your PCP, urgent care, or ED  - reversal agents for most blood thinners are now available and used if you have major bleeding    LIPID MANAGEMENT:   Qualifies for Statin Therapy Based on 2018 ACC/AHA Guidelines: no, Outside age range for consideration of statin therapy and primary prevention per ACC/AHA guidelines.   10-yr ASCVD risk score: N/A  Major ASCVD events: None  High-risk conditions: >64yr old   Currently on Statin: No  Treatment goals: LDL-C <100 (consider non-HDL-C <130, apoB <90)  At goal? N/A  Plan:   - reinforced ongoing TLC measures as noted   - monitor labs, deferred to PCP   Meds:   - none at this time      BLOOD PRESSURE MANAGEMENT:  ACC/AHA (2017) goal <130/80  Home BP at goal: yes  Office BP at goal:  yes   Plan:   - continue healthy diet, activity, weight mgmt   Monitoring:   - routine clinic-based BP measurements at least once annually   Medications: no meds indicated at this time         GLYCEMIC STATUS:  Normal    LIFESTYLE RECOMMENDATIONS:     Smoking:  reports that he has never smoked. He has never used smokeless tobacco.   - continued complete avoidance of all tobacco products     Physical Activity: continue healthy activity to improve CV fitness, see care instructions for additional details     Weight Management and Nutrition: Dietary plan was discussed with patient at this visit including DASH, low sodium and/or as outlined in care instructions     OTHER:    1) elevated PSA, increased  velocity   Seeing urologist, last PSA = 25, rapid increase   Though prostate CA is a lower risk malignancy in its association with VTE, there remains at least a 4-fold increased risk for recurrent VTE with increasing risk with age, stage, mets.   - defer surveillance of PSA and dx/tx options to urology     Instructed to follow-up with PCP for remainder of adult medical needs: yes  We will partner with other providers in the management of established vascular disease and cardiometabolic risk factors.    Studies to Be Obtained:    Labs to Be Obtained: as noted above     Follow up in: 2 months with me     Damian Barrett M.D.  [facility]   [facility]   [phone]

## 2021-02-17 ENCOUNTER — TELEPHONE (OUTPATIENT)
Dept: MEDICAL GROUP | Facility: MEDICAL CENTER | Age: 81
End: 2021-02-17

## 2021-02-17 NOTE — TELEPHONE ENCOUNTER
Pt. Called back and stated that the hip pain has been going on for three days. He states that it is worse and night and is causing him problems with sleep. He states he was wondering if it had anything to do with the blood clot. He states he has tried getting a hold of vascular medicine and has heard nothing back. Informed pt. i can put him on the schedule tomorrow with  at 4:30 however if he thinks it could be the clot to be seen today at Urgent Care. He verbalized understanding and will be seen with Urgent care today to be examined and potentially get imaging.

## 2021-02-17 NOTE — TELEPHONE ENCOUNTER
Phone Number Called: [phone] (home)     Call outcome: Unable to leave message/ voicemail is full    Message: pt. Called and left a voicemail stating that he is having some hip pain and wants to know if it is ok for him to take Meloxicam.  Pt. States that he will also reach out to his Vascular medicine doctor. Attempted to call patient to schedule an appointment however it went to voicemail and the voicemail was full.

## 2021-02-18 ENCOUNTER — OFFICE VISIT (OUTPATIENT)
Dept: MEDICAL GROUP | Facility: MEDICAL CENTER | Age: 81
End: 2021-02-18
Payer: MEDICARE

## 2021-02-18 VITALS
TEMPERATURE: 98 F | DIASTOLIC BLOOD PRESSURE: 66 MMHG | OXYGEN SATURATION: 97 % | BODY MASS INDEX: 26.05 KG/M2 | SYSTOLIC BLOOD PRESSURE: 118 MMHG | HEART RATE: 71 BPM | HEIGHT: 70 IN | WEIGHT: 182 LBS

## 2021-02-18 DIAGNOSIS — M25.552 LEFT HIP PAIN: ICD-10-CM

## 2021-02-18 DIAGNOSIS — I82.402 ACUTE DEEP VEIN THROMBOSIS (DVT) OF LEFT LOWER EXTREMITY, UNSPECIFIED VEIN (HCC): ICD-10-CM

## 2021-02-18 PROCEDURE — 99214 OFFICE O/P EST MOD 30 MIN: CPT | Performed by: FAMILY MEDICINE

## 2021-02-18 ASSESSMENT — FIBROSIS 4 INDEX: FIB4 SCORE: 1.14

## 2021-02-19 ENCOUNTER — APPOINTMENT (OUTPATIENT)
Dept: RADIOLOGY | Facility: MEDICAL CENTER | Age: 81
End: 2021-02-19
Attending: FAMILY MEDICINE
Payer: MEDICARE

## 2021-02-19 DIAGNOSIS — I82.402 ACUTE DEEP VEIN THROMBOSIS (DVT) OF LEFT LOWER EXTREMITY, UNSPECIFIED VEIN (HCC): ICD-10-CM

## 2021-02-19 DIAGNOSIS — M25.552 LEFT HIP PAIN: ICD-10-CM

## 2021-02-19 PROCEDURE — 93971 EXTREMITY STUDY: CPT | Mod: LT

## 2021-02-19 NOTE — ASSESSMENT & PLAN NOTE
Difficulty getting to sleep  L buttock  Only notices it at night  Somewhat mild, although was burning last night  Recent DVT diagnosed   Has been on blood thinner for 3 weeks now.   Not tender or sore to the touch  Not hot to the touch

## 2021-02-19 NOTE — PROGRESS NOTES
Subjective:     CC: Diagnoses of Left hip pain and Acute deep vein thrombosis (DVT) of left lower extremity, unspecified vein (HCC) were pertinent to this visit.    HPI: Patient is a 80 y.o. male established patient who presents today with the following concern.       Left hip pain  New problem. L buttock, posterior.   Difficulty getting to sleep due to the pain x 3 days.   Only notices it at night. Seems to be getting worse.   Somewhat mild, although was burning rather significantly last night  Recent DVT diagnosed   Has been on blood thinner for 3 weeks now.   DVT was quite large extending into the common femoral vein.   Not tender or sore to the touch  Not hot to the touch      Past Medical History:   Diagnosis Date   • Acute deep vein thrombosis (DVT) of left lower extremity (HCC) 01/2021   • Bipolar 1 disorder (HCC)    • Depression        Social History     Tobacco Use   • Smoking status: Never Smoker   • Smokeless tobacco: Never Used   Substance Use Topics   • Alcohol use: Yes     Comment: occasionally   • Drug use: Never       Current Outpatient Medications Ordered in Epic   Medication Sig Dispense Refill   • Acetaminophen (TYLENOL) 325 MG Cap Tylenol 325 mg capsule   Take by oral route.     • mupirocin (BACTROBAN) 2 % Ointment APPLY TO AFFECTED AREA 3 TIMES A DAY FOR 7 DAYS     • apixaban (ELIQUIS) 5mg Tab Take 1 Tab by mouth 2 Times a Day. 60 Tab 2   • azithromycin (ZITHROMAX) 250 MG Tab azithromycin 250 mg tablet   TAKE 2 TABLETS BY MOUTH TODAY, THEN TAKE 1 TABLET DAILY FOR 4 DAYS     • Mirabegron ER 25 MG TABLET SR 24 HR Myrbetriq 25 mg tablet,extended release   Take 1 tablet every day by oral route for 30 days.     • divalproex ER (DEPAKOTE ER) 500 MG TABLET SR 24 HR Take 1,000 mg by mouth every morning. Two tabs in am     • apixaban (ELIQUIS) 5mg Tab Eliquis 5 mg tablet     • fluticasone (FLONASE) 50 MCG/ACT nasal spray SPRAY 1 SPRAY IN NOSE EVERY DAY. (Patient not taking: Reported on 2/8/2021) 48 mL 0  "    No current [facility]-ordered facility-administered medications on file.       Allergies:  Patient has no known allergies.    Health Maintenance: Completed        Objective:       Exam:  /66 (BP Location: Left arm, Patient Position: Sitting, BP Cuff Size: Adult long)   Pulse 71   Temp 36.7 °C (98 °F) (Temporal)   Ht 1.778 m (5' 10\")   Wt 82.6 kg (182 lb)   SpO2 97%   BMI 26.11 kg/m²  Body mass index is 26.11 kg/m².    General: Normal appearing. No distress.  HEAD: NCAT  EYES: conjunctiva clear, lids without ptosis, pupils equal and reactive to light  EARS: ears normal shape and contour.  MOUTH: normal dentition   Neck:  Normal ROM  Pulmonary: Normal effort. Normal respiratory rate.  Cardiovascular: Well perfused.   Neurologic: Grossly normal, no focal deficits  Skin: Warm and dry.  No obvious lesions.  Musculoskeletal: Normal gait and station. No tenderness in the buttock.   Psych: Normal mood and affect. Alert and oriented x3. Judgment and insight is normal.    Imagin21 Results reviewed and discussed with the patient, questions answered.    Assessment & Plan:     80 y.o. male with the following -     1. Left hip pain  New problem. Started 3 days ago. Worst at night. Concerned it could be in relation to his DVT. Repeat u/s ordered to ensure no propagation of the clot. Currently on eliquis. Stat order placed, has appt for tomorrow at 2:15.    - US-EXTREMITY VENOUS LOWER UNILAT LEFT; Future    2. Acute deep vein thrombosis (DVT) of left lower extremity, unspecified vein (HCC)  - US-EXTREMITY VENOUS LOWER UNILAT LEFT; Future      Return if symptoms worsen or fail to improve.    Please note that this dictation was created using voice recognition software. I have made every reasonable attempt to correct obvious errors, but I expect that there are errors of grammar and possibly content that I did not discover before finalizing the note.      "

## 2021-03-01 ENCOUNTER — ANTICOAGULATION VISIT (OUTPATIENT)
Dept: MEDICAL GROUP | Facility: MEDICAL CENTER | Age: 81
End: 2021-03-01
Payer: MEDICARE

## 2021-03-01 DIAGNOSIS — I82.412 ACUTE DEEP VEIN THROMBOSIS (DVT) OF FEMORAL VEIN OF LEFT LOWER EXTREMITY (HCC): ICD-10-CM

## 2021-03-01 PROCEDURE — 99211 OFF/OP EST MAY X REQ PHY/QHP: CPT | Performed by: INTERNAL MEDICINE

## 2021-03-01 NOTE — PROGRESS NOTES
Target end date: Likely extend to full 6 months per Dr Barrett - 7/11/21  Indication: acute LLE DVT, SVT  Drug: Eliquis 5 mg BID  CHADsVASC = n/a  HAS-BLED = 1 (age)    Health Status Since Last Assessment   Patient denies any new relevant medical problems, ED visits or hospitalizations   Patient denies any embolic events (stroke/tia/systemic embolism)    Adherence with DOAC Therapy   Pt has not missed any doses in the average week    Bleeding Risk Assessment     Pt experienced Epistaxis on Xarelto, then was switched to Eliquis - denies any epistaxis since then   Pt denies any excessive or unusual bleeding/hematomas.  Pt denies any GI bleeds or hematemesis.  Pt denies any concerning daily headache or sub dural hematoma symptoms.     Pt denies any hematuria    Latest Hemoglobin pending   Platelets: pending   ETOH overuse denies     Creatinine Clearance/Renal Function     Latest ClCr pending    Hepatic function   Latest LFTs pending   Pt denies any history of liver dysfunction      Drug Interactions   Medications reconciled    ASA/other antiplatelets none   NSAID none   Other drug interactions    Verified no azole therapy, education provided for future use. - Pt is on Depakote, however there is no DDI with Eliquis     Examination   Blood Pressure declined d/t COVID-19 concerns.  There were no vitals filed for this visit.   Symptomatic hypotension no   Significant gait impairment/imbalance/high risk for falls? no    Final Assessment and Recommendations:   Patient appears stable from the anticoagulation standpoint.     Benefits of continued DOAC therapy outweigh risks for this patient   Recommend pt continue with current DOAC therapy     Other Actions: cmp/ cbc hemogram ordered prior to next visit    Follow up:  1 month with Dr. Barrett   Will follow up with patient 3  months.      Rika Rowell, WillieD

## 2021-03-05 ENCOUNTER — IMMUNIZATION (OUTPATIENT)
Dept: FAMILY PLANNING/WOMEN'S HEALTH CLINIC | Facility: IMMUNIZATION CENTER | Age: 81
End: 2021-03-05
Attending: INTERNAL MEDICINE
Payer: MEDICARE

## 2021-03-05 DIAGNOSIS — Z23 ENCOUNTER FOR VACCINATION: Primary | ICD-10-CM

## 2021-03-05 PROCEDURE — 0012A MODERNA SARS-COV-2 VACCINE: CPT

## 2021-03-05 PROCEDURE — 91301 MODERNA SARS-COV-2 VACCINE: CPT

## 2021-04-08 ENCOUNTER — OFFICE VISIT (OUTPATIENT)
Dept: VASCULAR LAB | Facility: MEDICAL CENTER | Age: 81
End: 2021-04-08
Attending: FAMILY MEDICINE
Payer: MEDICARE

## 2021-04-08 VITALS
WEIGHT: 182 LBS | DIASTOLIC BLOOD PRESSURE: 59 MMHG | HEART RATE: 63 BPM | BODY MASS INDEX: 26.05 KG/M2 | HEIGHT: 70 IN | SYSTOLIC BLOOD PRESSURE: 123 MMHG

## 2021-04-08 DIAGNOSIS — I82.402 ACUTE DEEP VEIN THROMBOSIS (DVT) OF LEFT LOWER EXTREMITY, UNSPECIFIED VEIN (HCC): ICD-10-CM

## 2021-04-08 PROCEDURE — 99212 OFFICE O/P EST SF 10 MIN: CPT

## 2021-04-08 PROCEDURE — 99214 OFFICE O/P EST MOD 30 MIN: CPT | Performed by: FAMILY MEDICINE

## 2021-04-08 RX ORDER — TOLTERODINE 4 MG/1
CAPSULE, EXTENDED RELEASE ORAL
COMMUNITY

## 2021-04-08 ASSESSMENT — ENCOUNTER SYMPTOMS
COUGH: 0
ABDOMINAL PAIN: 0
MYALGIAS: 0
PALPITATIONS: 0
DIARRHEA: 0
SEIZURES: 0
CHILLS: 0
HEMOPTYSIS: 0
CLAUDICATION: 0
SHORTNESS OF BREATH: 0
DIZZINESS: 0
BRUISES/BLEEDS EASILY: 0
VOMITING: 0
TREMORS: 0
WHEEZING: 0
FOCAL WEAKNESS: 0
FEVER: 0
HEADACHES: 0
WEAKNESS: 0
NAUSEA: 0

## 2021-04-08 ASSESSMENT — FIBROSIS 4 INDEX: FIB4 SCORE: 1.14

## 2021-04-08 NOTE — PROGRESS NOTES
FOLLOW-UP VASCULAR ANTICOAGULATION VISIT  Subjective:   Laurent Infante is a 80 y.o. male who presents today 02/08/21 for   Chief Complaint   Patient presents with   • Follow-Up     Initially referred by Roxy Ramon M.D. for length of therapy (LOT) determination and management of anticoagulation in context of acute venothromboembolic disease    HPI:     VTE disease / Anticoagulation:   Current symptoms:  Reports some intermittent L gluteal region dull aching, can awaken him at night.  Denies claudication or distal LE discoloration.   Current antithrombotic agent: eliquis 5mg BID   Complications: none, tolerating well, no bleeding or bruising.  Had bleeding from R varicose vein. Stopped spontaneous   Date of initiation of anticoagulation: 1/8/21   Adherence: reports complete, no missed doses      Pertinent VTE pmhx:   Date of Diagnosis: 1/8/21  Type of Venous thromboembolic disease (VTE): acute LLE SVT and DVT at SPJ into CFV  Type of imaging: duplex   Preceding/presenting symptoms: prior to date of dx had LLE pain with swelling that she awoke with acutely from thigh to distal leg.  ROM limited at knee from swelling.  Seen by PCP and had duplex   Antithrombotic therapy at time of VTE event: no  VTE tx course: Eliquis 10mg BID x 7d, now eliquis 5mg BID   Any personal VTE hx? Yes, Details: acute/chronic LLE SVT, varicosities BLE - long-standing   Any family VTE hx? No    UNPROVOKED VS PROVOKED:   Recent surgery ? No  Recent trauma ? No  Smoker?  reports that he has never smoked. He has never used smokeless tobacco.    Extended travel? No  Other periods of immobility? No  Other risk factors for VTE disease:  no  MEN:  Colorectal CA screening:yes              Prostate CA screening: most recently 25, reports many years elevated, seeing urology (Dr. Hagen) - pending f/u and ongoing care.     Hx of Testosterone therapy: no  Hypercoaguability work-up completed?  no (see assessment for details)    Current  "Outpatient Medications:   •  apixaban, 5 mg, Oral, BID  •  Tylenol, Tylenol 325 mg capsule  Take by oral route., Taking  •  mupirocin, APPLY TO AFFECTED AREA 3 TIMES A DAY FOR 7 DAYS, Taking  •  divalproex ER, 1,000 mg, Oral, QAM, Taking  •  tolterodine ER, tolterodine ER 4 mg capsule,extended release 24 hr  TAKE 1 CAPSULE BY MOUTH EVERY DAY   No Known Allergies  Social History     Tobacco Use   • Smoking status: Never Smoker   • Smokeless tobacco: Never Used   Substance Use Topics   • Alcohol use: Yes     Comment: occasionally   • Drug use: Never       DIET AND EXERCISE:  Weight Change:stable   BMI Readings from Last 5 Encounters:   04/08/21 26.11 kg/m²   02/18/21 26.11 kg/m²   02/08/21 26.26 kg/m²   01/07/21 26.26 kg/m²   12/01/20 26.11 kg/m²     Diet: common adult  Exercise: moderate regular exercise program     Review of Systems   Constitutional: Negative for chills, fever and malaise/fatigue.   Respiratory: Negative for cough, hemoptysis, shortness of breath and wheezing.    Cardiovascular: Negative for chest pain, palpitations, claudication and leg swelling.   Gastrointestinal: Negative for abdominal pain, diarrhea, nausea and vomiting.   Musculoskeletal: Negative for joint pain and myalgias.   Skin: Negative for itching and rash.   Neurological: Negative for dizziness, tremors, focal weakness, seizures, weakness and headaches.   Endo/Heme/Allergies: Does not bruise/bleed easily.        Objective:     Vitals:    04/08/21 1454   BP: 123/59   BP Location: Left arm   Patient Position: Sitting   BP Cuff Size: Adult   Pulse: 63   Weight: 82.6 kg (182 lb)   Height: 1.778 m (5' 10\")      BP Readings from Last 5 Encounters:   04/08/21 123/59   02/18/21 118/66   02/08/21 116/56   01/07/21 116/72   12/01/20 118/68      Body mass index is 26.11 kg/m².  Physical Exam  Vitals reviewed.   Constitutional:       Appearance: Normal appearance.   HENT:      Head: Normocephalic and atraumatic.      Nose: Nose normal.      " Mouth/Throat:      Mouth: Mucous membranes are moist.      Pharynx: Oropharynx is clear.   Eyes:      Extraocular Movements: Extraocular movements intact.      Conjunctiva/sclera: Conjunctivae normal.   Cardiovascular:      Rate and Rhythm: Normal rate and regular rhythm.      Pulses: Normal pulses.           Carotid pulses are 2+ on the right side and 2+ on the left side.       Radial pulses are 2+ on the right side and 2+ on the left side.        Dorsalis pedis pulses are 2+ on the right side and 2+ on the left side.        Posterior tibial pulses are 2+ on the right side and 2+ on the left side.      Heart sounds: Normal heart sounds.      Comments:    Spider telangectasia:       RLE:  None      LLE: none   Varicosities:           RLE: none      LLE: none   Corona phlebectatica:      RLE:  None        LLE:  None   Cording:         RLE:  None     LLE: None     Pulmonary:      Effort: Pulmonary effort is normal.      Breath sounds: Normal breath sounds.   Abdominal:      General: Abdomen is flat. Bowel sounds are normal.      Palpations: Abdomen is soft.   Musculoskeletal:      Cervical back: Normal range of motion and neck supple.      Right lower leg: No edema.      Left lower leg: No edema.   Skin:     General: Skin is warm and dry.      Capillary Refill: Capillary refill takes less than 2 seconds.   Neurological:      General: No focal deficit present.      Mental Status: He is alert and oriented to person, place, and time. Mental status is at baseline.   Psychiatric:         Mood and Affect: Mood normal.         Behavior: Behavior normal.         Lab Results   Component Value Date    CHOLSTRLTOT 161 11/11/2019    LDL 91 11/11/2019    HDL 57 11/11/2019    TRIGLYCERIDE 65 11/11/2019           Lab Results   Component Value Date    SODIUM 140 11/11/2019    POTASSIUM 4.0 11/11/2019    CHLORIDE 107 11/11/2019    CO2 26 11/11/2019    GLUCOSE 89 11/11/2019    BUN 17 11/11/2019    CREATININE 0.90 11/11/2019    IFAFRICA  >60 11/11/2019    IFNOTAFR >60 11/11/2019        Lab Results   Component Value Date    WBC 6.1 12/09/2019    RBC 4.40 (L) 12/09/2019    HEMOGLOBIN 13.6 (L) 12/09/2019    HEMATOCRIT 40.9 (L) 12/09/2019    MCV 93.0 12/09/2019    MCH 30.9 12/09/2019    MCHC 33.3 (L) 12/09/2019    MPV 9.6 12/09/2019         Ref. Range 1/7/2021 15:46   D-Dimer Screen Latest Ref Range: 0.00 - 0.50 ug/mL (FEU) 1.42 (H)      Ref. Range 2/8/2019 13:52 11/11/2019 12:57 8/26/2020 09:51 12/16/2020 15:56 1/28/2021 15:20   Prostatic Specific Antigen Tot Latest Ref Range: 0.00 - 4.00 ng/mL 6.00 (H)   17.30 (H) 25.10 (H)     VASCULAR IMAGING:     Last EKG:   No results found for this or any previous visit.     BLE venous 1/8/21   1. Acute DVT in the LEFT saphenofemoral junction, extending into the common    femoral vein.   2. Acute SVT from the LEFT ankle to thigh.   3. No RIGHT DVT or SVT.    Medical Decision Making:  Today's Assessment / Status / Plan:     1. Acute deep vein thrombosis (DVT) of left lower extremity, unspecified vein (HCC)  apixaban (ELIQUIS) 5mg Tab     PATIENT TYPE: Primary Prevention    Etiology of Established CVD if Present:   1) acute LLE DVT, SVT, 1/2021     ANTITHROMBOTIC THERAPY:  Anti-Platelet/Anti-Coagulant Tx recommended: yes  Indication: acute LLE DVT, SVT, 1/2021   Date of initiation: 1/8/21  HAS-BLED bleeding risk calc ([email or web address]): 1 pt, 3.4%, low risk  Thrombophilia/hypercoag evaluation:  not recommended  Factors to consider for indefinite OAC: Unprovoked VTE event and Male sex   Last CBC, BMP: 2019  Expected duration: reviewed standard of care as per ACCP AT10 guidelines (2016) is 3-6 months minimum on full dose OAC.  After review of risks/benefits/alternatives, through shared decision-making, we will continue full dose OAC, review extension to 6mo at next visit.    - Based upon current factors including male gender, extensive unprovoked VTE, age, baseline varicosities with hx of SVT will strongly consider  indefinite therapy at future visits.    - Elevated PSA and any dx of prostate CA will also factor into this, so will need further input from Urology, as active malignancy and worsening stage will further increase risk of recurrent VTE if off OAC.   Antithrombotic therapy plan:  - continue eliquis 5mg BID through 7/2021, then consider transition to ASA   - stressed strict adherence to tx and avoid early termination due to increased risk for recurrent VTE  - check CBC, BMP (CMP if any underlying liver disease), and monitor CrCl as needed Q6mo while on DOAC  - counseled on signs and symptoms of acute VTE that require seeking prompt attention in the ED to include shortness of breath, chest pain, pain with deep inhalation, acute leg swelling and/or pain in calf or leg   - elevate legs as much as possible, use compression stockings/socks if directed by your provider  - Avoid hormonal therapies including estrogen or testosterone-containing meds, or raloxifene or tamoxifene (commonly used for osteoporosis)  - Avoid sedentary periods  - continue complete avoidance of tobacco products  - if having any invasive procedure,please make sure the doctor knows of your history of blood clots and current anticoagulation status  - avoid Aspirin and anti-inflammatories (eg. Advil, ibuprofen, Aleve, naproxen, etc) while anticoagulated   - avoid skiing or other dangerous activities to reduce risk of head injury and brain bleeds  - recommended to see your PCP to discuss if you need age-appropriate cancer screenings as a small % of blood clots may be caused by an underlying malignancy  - if any bleeding lasting 30min without stopping, please seek care with your PCP, urgent care, or ED  - reversal agents for most blood thinners are now available and used if you have major bleeding    LIPID MANAGEMENT:   Qualifies for Statin Therapy Based on 2018 ACC/AHA Guidelines: no, Outside age range for consideration of statin therapy and primary  prevention per ACC/AHA guidelines.   10-yr ASCVD risk score: N/A  Major ASCVD events: None  High-risk conditions: >64yr old   Currently on Statin: No  Treatment goals: LDL-C <100 (consider non-HDL-C <130, apoB <90)  At goal? N/A  Plan:   - reinforced ongoing TLC measures as noted   - monitor labs, deferred to PCP   Meds:   - none at this time      BLOOD PRESSURE MANAGEMENT:  ACC/AHA (2017) goal <130/80  Home BP at goal: yes  Office BP at goal:  yes   Plan:   - continue healthy diet, activity, weight mgmt   Monitoring:   - routine clinic-based BP measurements at least once annually   Medications: no meds indicated at this time         GLYCEMIC STATUS:  Normal    LIFESTYLE RECOMMENDATIONS:     Smoking:  reports that he has never smoked. He has never used smokeless tobacco.   - continued complete avoidance of all tobacco products     Physical Activity: continue healthy activity to improve CV fitness, see care instructions for additional details     Weight Management and Nutrition: Dietary plan was discussed with patient at this visit including DASH, low sodium and/or as outlined in care instructions     OTHER:    1) elevated PSA, increased velocity   Seeing urologist, last PSA = 25, rapid increase   Though prostate CA is a lower risk malignancy in its association with VTE, there remains at least a 4-fold increased risk for recurrent VTE with increasing risk with age, stage, mets.   - defer surveillance of PSA and dx/tx options to urology     Instructed to follow-up with PCP for remainder of adult medical needs: yes  We will partner with other providers in the management of established vascular disease and cardiometabolic risk factors.    Studies to Be Obtained:  Consider repeaet imaging if sx at 6mo   Labs to Be Obtained: as noted above     Follow up in:  July with me     Damian Barrett M.D.  [facility]   [facility]   [phone]

## 2021-04-26 DIAGNOSIS — I82.402 ACUTE DEEP VEIN THROMBOSIS (DVT) OF LEFT LOWER EXTREMITY, UNSPECIFIED VEIN (HCC): ICD-10-CM

## 2021-04-27 ENCOUNTER — HOSPITAL ENCOUNTER (OUTPATIENT)
Facility: MEDICAL CENTER | Age: 81
End: 2021-04-27
Attending: PHYSICIAN ASSISTANT
Payer: MEDICARE

## 2021-04-27 LAB — PSA SERPL-MCNC: 19.9 NG/ML (ref 0–4)

## 2021-04-27 PROCEDURE — 84153 ASSAY OF PSA TOTAL: CPT

## 2021-04-27 RX ORDER — APIXABAN 5 MG/1
TABLET, FILM COATED ORAL
Qty: 180 TABLET | Refills: 1 | Status: SHIPPED | OUTPATIENT
Start: 2021-04-27 | End: 2021-04-28

## 2021-04-28 ENCOUNTER — TELEPHONE (OUTPATIENT)
Dept: VASCULAR LAB | Facility: MEDICAL CENTER | Age: 81
End: 2021-04-28

## 2021-04-28 DIAGNOSIS — I82.402 ACUTE DEEP VEIN THROMBOSIS (DVT) OF LEFT LOWER EXTREMITY, UNSPECIFIED VEIN (HCC): ICD-10-CM

## 2021-04-28 RX ORDER — APIXABAN 5 MG/1
TABLET, FILM COATED ORAL
Qty: 60 TABLET | Refills: 2 | Status: SHIPPED | OUTPATIENT
Start: 2021-04-28 | End: 2021-07-28

## 2021-04-28 NOTE — TELEPHONE ENCOUNTER
Called pt and informed him of the below message.   ----- Message from Michael J Bloch, M.D. sent at 4/28/2021 11:31 AM PDT -----  Regarding: RE: Patient Call - Leg Pain - Questions  Hopefully tylenol will help. Agree with having him see humza in am.  Mb  ----- Message -----  From: Alicia Christensen, Med Ass't  Sent: 4/28/2021  11:26 AM PDT  To: Michael J Bloch, M.D.  Subject: FW: Patient Call - Leg Pain - Questions          Hey is there anything else we can have this pt do while he waits to see Dr. HERRERA tomorrow??    Let me know.   Thanks!   ----- Message -----  From: Mya Shelley  Sent: 4/28/2021   9:09 AM PDT  To: [facility]  Subject: Patient Call - Leg Pain - Questions              Hi,    Pt called to say that he's having a lot of pain in left hip issue. He has a blood clot in left leg and was wondering if they may be related and if there is anything that can be prescribed that can alleviate the pain. Due to the severity of the pain he hasn't slept in 3 nights.     Pt started Tylenol yesterday evening which was the first day after taking Aleve which didn't help at all. It looks like the Tylenol is starting to kick in. Pt stopped taking Aleve on Tuesday, AM.     I did schedule pt for an appointment tomorrow @ 11 with Dr. Barrett just in case you wanted him to be seen. Per your suggestion pt will decide if he needs to keep the appointment or not.     The pt stated that in his last visit with Dr. Barrett (4/8) thought this issue was unrelated but since the pain has gotten progressively worse he's reaching out to Dr. Barrett for help.         Please call     ThanksMya

## 2021-04-28 NOTE — TELEPHONE ENCOUNTER
Called and informed pt of the below message   ----- Message from Michael J Bloch, M.D. sent at 4/28/2021 11:31 AM PDT -----  Regarding: RE: Patient Call - Leg Pain - Questions  Hopefully tylenol will help. Agree with having him see humza in am.  Mb  ----- Message -----  From: Alicia Christensen, Med Ass't  Sent: 4/28/2021  11:26 AM PDT  To: Michael J Bloch, M.D.  Subject: FW: Patient Call - Leg Pain - Questions          Hey is there anything else we can have this pt do while he waits to see Dr. HERRERA tomorrow??    Let me know.   Thanks!   ----- Message -----  From: Mya Shelley  Sent: 4/28/2021   9:09 AM PDT  To: [facility]  Subject: Patient Call - Leg Pain - Questions              Hi,    Pt called to say that he's having a lot of pain in left hip issue. He has a blood clot in left leg and was wondering if they may be related and if there is anything that can be prescribed that can alleviate the pain. Due to the severity of the pain he hasn't slept in 3 nights.     Pt started Tylenol yesterday evening which was the first day after taking Aleve which didn't help at all. It looks like the Tylenol is starting to kick in. Pt stopped taking Aleve on Tuesday, AM.     I did schedule pt for an appointment tomorrow @ 11 with Dr. Barrett just in case you wanted him to be seen. Per your suggestion pt will decide if he needs to keep the appointment or not.     The pt stated that in his last visit with Dr. Barrett (4/8) thought this issue was unrelated but since the pain has gotten progressively worse he's reaching out to Dr. Barrett for help.         Please call     ThanksMya

## 2021-04-29 ENCOUNTER — OFFICE VISIT (OUTPATIENT)
Dept: VASCULAR LAB | Facility: MEDICAL CENTER | Age: 81
End: 2021-04-29
Attending: FAMILY MEDICINE
Payer: MEDICARE

## 2021-04-29 ENCOUNTER — HOSPITAL ENCOUNTER (OUTPATIENT)
Dept: RADIOLOGY | Facility: MEDICAL CENTER | Age: 81
End: 2021-04-29
Attending: FAMILY MEDICINE
Payer: MEDICARE

## 2021-04-29 VITALS
HEIGHT: 70 IN | WEIGHT: 182 LBS | HEART RATE: 64 BPM | BODY MASS INDEX: 26.05 KG/M2 | DIASTOLIC BLOOD PRESSURE: 66 MMHG | SYSTOLIC BLOOD PRESSURE: 124 MMHG

## 2021-04-29 DIAGNOSIS — M25.559 HIP PAIN: ICD-10-CM

## 2021-04-29 DIAGNOSIS — Z79.01 CHRONIC ANTICOAGULATION: ICD-10-CM

## 2021-04-29 DIAGNOSIS — M79.605 LEFT LEG PAIN: ICD-10-CM

## 2021-04-29 DIAGNOSIS — I82.812 ACUTE SUPERFICIAL VENOUS THROMBOSIS OF LOWER EXTREMITY, LEFT: ICD-10-CM

## 2021-04-29 DIAGNOSIS — R97.20 ELEVATED PSA: ICD-10-CM

## 2021-04-29 DIAGNOSIS — I82.412 ACUTE DEEP VEIN THROMBOSIS (DVT) OF FEMORAL VEIN OF LEFT LOWER EXTREMITY (HCC): ICD-10-CM

## 2021-04-29 DIAGNOSIS — I87.2 CHRONIC VENOUS INSUFFICIENCY: ICD-10-CM

## 2021-04-29 PROCEDURE — 99212 OFFICE O/P EST SF 10 MIN: CPT | Mod: 25

## 2021-04-29 PROCEDURE — 73502 X-RAY EXAM HIP UNI 2-3 VIEWS: CPT | Mod: LT

## 2021-04-29 PROCEDURE — 93971 EXTREMITY STUDY: CPT | Mod: LT

## 2021-04-29 PROCEDURE — 99214 OFFICE O/P EST MOD 30 MIN: CPT | Performed by: FAMILY MEDICINE

## 2021-04-29 PROCEDURE — 93926 LOWER EXTREMITY STUDY: CPT | Mod: LT

## 2021-04-29 ASSESSMENT — ENCOUNTER SYMPTOMS
HEADACHES: 0
PALPITATIONS: 0
TREMORS: 0
SHORTNESS OF BREATH: 0
NAUSEA: 0
WHEEZING: 0
DIARRHEA: 0
CHILLS: 0
DIZZINESS: 0
FEVER: 0
WEAKNESS: 0
CLAUDICATION: 0
SEIZURES: 0
COUGH: 0
HEMOPTYSIS: 0
BRUISES/BLEEDS EASILY: 0
ABDOMINAL PAIN: 0
VOMITING: 0
MYALGIAS: 0
FOCAL WEAKNESS: 0

## 2021-04-29 ASSESSMENT — FIBROSIS 4 INDEX: FIB4 SCORE: 1.14

## 2021-04-29 NOTE — PROGRESS NOTES
FOLLOW-UP VASCULAR ANTICOAGULATION VISIT  Subjective:   Laurent Infante is a 80 y.o. male who presents today 04/29/21 for   Chief Complaint   Patient presents with   • Follow-Up   Initially referred by Roxy Ramon M.D. for length of therapy (LOT) determination and management of anticoagulation in context of acute venothromboembolic disease    HPI:     VTE disease / Anticoagulation:   Current symptoms:  Ongoing progressive L gluteal pain, acutely worsening since Sunday.  Notes discomfort and trouble sleeping.  Tried some nsaids w/o relief. Using tylenol and heating pad with mild relief.  Notes radiating pain to ankle.  No back injuries, no hx of sciatica.  Since sunday has worsened from 2/10 to 5-6/10.    Current antithrombotic agent: eliquis 5mg BID   Complications: none, tolerating well, no bleeding or bruising.  Had bleeding from R varicose vein. Stopped spontaneous   Date of initiation of anticoagulation: 1/8/21   Adherence: reports complete, no missed doses     Pertinent VTE pmhx:   Date of Diagnosis: 1/8/21  Type of Venous thromboembolic disease (VTE): acute LLE SVT and DVT at SPJ into CFV  Type of imaging: duplex   Preceding/presenting symptoms: prior to date of dx had LLE pain with swelling that she awoke with acutely from thigh to distal leg.  ROM limited at knee from swelling.  Seen by PCP and had duplex   Antithrombotic therapy at time of VTE event: no  VTE tx course: Eliquis 10mg BID x 7d, now eliquis 5mg BID   Any personal VTE hx? Yes, Details: acute/chronic LLE SVT, varicosities BLE - long-standing   Any family VTE hx? No   UNPROVOKED VS PROVOKED:   Recent surgery ? No  Recent trauma ? No  Smoker?  reports that he has never smoked. He has never used smokeless tobacco.    Extended travel? No  Other periods of immobility? No  Other risk factors for VTE disease:  no  MEN:  Colorectal CA screening:yes              Prostate CA screening: most recently 25, reports many years elevated, seeing urology  "(Dr. Hagen) - pending f/u and ongoing care.     Hx of Testosterone therapy: no  Hypercoaguability work-up completed?  no (see assessment for details)    Current Outpatient Medications:   •  Eliquis, TAKE 1 TABLET BY MOUTH TWICE A DAY, Taking  •  tolterodine ER, tolterodine ER 4 mg capsule,extended release 24 hr  TAKE 1 CAPSULE BY MOUTH EVERY DAY, Taking  •  Tylenol, Tylenol 325 mg capsule  Take by oral route., Taking  •  mupirocin, APPLY TO AFFECTED AREA 3 TIMES A DAY FOR 7 DAYS, Taking  •  divalproex ER, 1,000 mg, Oral, QAM, Taking   No Known Allergies  Social History     Tobacco Use   • Smoking status: Never Smoker   • Smokeless tobacco: Never Used   Substance Use Topics   • Alcohol use: Yes     Comment: occasionally   • Drug use: Never       DIET AND EXERCISE:  Weight Change:stable   BMI Readings from Last 5 Encounters:   04/29/21 26.11 kg/m²   04/08/21 26.11 kg/m²   02/18/21 26.11 kg/m²   02/08/21 26.26 kg/m²   01/07/21 26.26 kg/m²     Diet: common adult  Exercise: moderate regular exercise program     Review of Systems   Constitutional: Negative for chills, fever and malaise/fatigue.   Respiratory: Negative for cough, hemoptysis, shortness of breath and wheezing.    Cardiovascular: Positive for leg swelling (L > R). Negative for chest pain, palpitations and claudication.   Gastrointestinal: Negative for abdominal pain, diarrhea, nausea and vomiting.   Musculoskeletal: Negative for joint pain and myalgias.        L gluteal pain    Skin: Negative for itching and rash.   Neurological: Negative for dizziness, tremors, focal weakness, seizures, weakness and headaches.   Endo/Heme/Allergies: Does not bruise/bleed easily.        Objective:     Vitals:    04/29/21 1101 04/29/21 1106   BP: 135/66 124/66   BP Location: Left arm Left arm   Patient Position: Sitting Sitting   BP Cuff Size: Adult Adult   Pulse: 66 64   Weight: 82.6 kg (182 lb)    Height: 1.778 m (5' 10\")       BP Readings from Last 5 Encounters: "   04/29/21 124/66   04/08/21 123/59   02/18/21 118/66   02/08/21 116/56   01/07/21 116/72      Body mass index is 26.11 kg/m².  Physical Exam  Vitals reviewed.   Constitutional:       Appearance: Normal appearance.   HENT:      Head: Normocephalic and atraumatic.      Nose: Nose normal.      Mouth/Throat:      Mouth: Mucous membranes are moist.      Pharynx: Oropharynx is clear.   Eyes:      Extraocular Movements: Extraocular movements intact.      Conjunctiva/sclera: Conjunctivae normal.   Cardiovascular:      Rate and Rhythm: Normal rate and regular rhythm.      Pulses: Normal pulses.           Carotid pulses are 2+ on the right side and 2+ on the left side.       Radial pulses are 2+ on the right side and 2+ on the left side.        Dorsalis pedis pulses are 2+ on the right side and 2+ on the left side.        Posterior tibial pulses are 2+ on the right side and 2+ on the left side.      Heart sounds: Normal heart sounds.      Comments:    Spider telangectasia:       RLE:  None      LLE: none   Varicosities:           RLE: none      LLE: diffuse lower limb   Corona phlebectatica:      RLE:  None        LLE:  mild  Cording:         RLE:  None     LLE: None     Pulmonary:      Effort: Pulmonary effort is normal.      Breath sounds: Normal breath sounds.   Abdominal:      General: Abdomen is flat. Bowel sounds are normal.      Palpations: Abdomen is soft.   Musculoskeletal:      Cervical back: Normal range of motion and neck supple.        Back:       Right lower leg: No edema.      Left lower leg: No edema.   Skin:     General: Skin is warm and dry.      Capillary Refill: Capillary refill takes less than 2 seconds.          Neurological:      General: No focal deficit present.      Mental Status: He is alert and oriented to person, place, and time. Mental status is at baseline.   Psychiatric:         Mood and Affect: Mood normal.         Behavior: Behavior normal.         Lab Results   Component Value Date     CHOLSTRLTOT 161 11/11/2019    LDL 91 11/11/2019    HDL 57 11/11/2019    TRIGLYCERIDE 65 11/11/2019           Lab Results   Component Value Date    SODIUM 140 11/11/2019    POTASSIUM 4.0 11/11/2019    CHLORIDE 107 11/11/2019    CO2 26 11/11/2019    GLUCOSE 89 11/11/2019    BUN 17 11/11/2019    CREATININE 0.90 11/11/2019    IFA[place] >60 11/11/2019    IFNOTAFR >60 11/11/2019        Lab Results   Component Value Date    WBC 6.1 12/09/2019    RBC 4.40 (L) 12/09/2019    HEMOGLOBIN 13.6 (L) 12/09/2019    HEMATOCRIT 40.9 (L) 12/09/2019    MCV 93.0 12/09/2019    MCH 30.9 12/09/2019    MCHC 33.3 (L) 12/09/2019    MPV 9.6 12/09/2019         Ref. Range 1/7/2021 15:46   D-Dimer Screen Latest Ref Range: 0.00 - 0.50 ug/mL (FEU) 1.42 (H)      Ref. Range 2/8/2019 13:52 11/11/2019 12:57 8/26/2020 09:51 12/16/2020 15:56 1/28/2021 15:20   Prostatic Specific Antigen Tot Latest Ref Range: 0.00 - 4.00 ng/mL 6.00 (H)   17.30 (H) 25.10 (H)     VASCULAR IMAGING:     Last EKG:   No results found for this or any previous visit.     BLE venous 1/8/21   1. Acute DVT in the LEFT saphenofemoral junction, extending into the common    femoral vein.   2. Acute SVT from the LEFT ankle to thigh.   3. No RIGHT DVT or SVT.    Medical Decision Making:  Today's Assessment / Status / Plan:     1. Acute deep vein thrombosis (DVT) of femoral vein of left lower extremity (HCC)  US-EXTREMITY VENOUS LOWER UNILAT LEFT    CANCELED: US-EXTREMITY VENOUS LOWER UNILAT LEFT   2. Acute superficial venous thrombosis of lower extremity, left  US-EXTREMITY VENOUS LOWER UNILAT LEFT   3. Chronic venous insufficiency  CANCELED: US-EXTREMITY VENOUS LOWER UNILAT LEFT   4. Chronic anticoagulation  US-EXTREMITY VENOUS LOWER UNILAT LEFT   5. Elevated PSA     6. Left leg pain  DX-HIP-COMPLETE - UNILATERAL 2+ LEFT    US-EXTREMITY VENOUS LOWER UNILAT LEFT    US-EXTREMITY ARTERY LOWER BILAT W/TANO (COMBO)    CANCELED: US-EXTREMITY ARTERY LOWER BILAT W/TANO (COMBO)   7. Hip pain   DX-HIP-COMPLETE - UNILATERAL 2+ LEFT    US-EXTREMITY VENOUS LOWER UNILAT LEFT     PATIENT TYPE: Primary Prevention    Etiology of Established CVD if Present:   1) acute LLE DVT, SVT, 1/2021     ANTITHROMBOTIC THERAPY:  Anti-Platelet/Anti-Coagulant Tx recommended: yes  Indication: acute LLE DVT, SVT, 1/2021   Date of initiation: 1/8/21  HAS-BLED bleeding risk calc ([email or web address]): 1 pt, 3.4%, low risk  Thrombophilia/hypercoag evaluation:  not recommended  Factors to consider for indefinite OAC: Unprovoked VTE event and Male sex   Last CBC, BMP: 2019  Expected duration: reviewed standard of care as per ACCP AT10 guidelines (2016) is 3-6 months minimum on full dose OAC.  After review of risks/benefits/alternatives, through shared decision-making, we will continue full dose OAC, review extension to 6mo at next visit.    - Based upon current factors including male gender, extensive unprovoked VTE, age, baseline varicosities with hx of SVT will strongly consider indefinite therapy at future visits.    - Elevated PSA and any dx of prostate CA will also factor into this, so will need further input from Urology, as active malignancy and worsening stage will further increase risk of recurrent VTE if off OAC.   Clear indications of postthrombotic syndrome, however gluteal pain pattern highly unusual pattern for venous mediated disease   Antithrombotic therapy plan:  - continue eliquis 5mg BID through 7/2021, then consider transition to ASA   - check imaging as note below   - stressed strict adherence to tx and avoid early termination due to increased risk for recurrent VTE  - check CBC, BMP (CMP if any underlying liver disease), and monitor CrCl as needed Q6mo while on DOAC  - counseled on signs and symptoms of acute VTE that require seeking prompt attention in the ED to include shortness of breath, chest pain, pain with deep inhalation, acute leg swelling and/or pain in calf or leg   - elevate legs as much as possible, use  compression stockings/socks if directed by your provider  - Avoid hormonal therapies including estrogen or testosterone-containing meds, or raloxifene or tamoxifene (commonly used for osteoporosis)  - Avoid sedentary periods  - continue complete avoidance of tobacco products  - if having any invasive procedure,please make sure the doctor knows of your history of blood clots and current anticoagulation status  - avoid Aspirin and anti-inflammatories (eg. Advil, ibuprofen, Aleve, naproxen, etc) while anticoagulated   - avoid skiing or other dangerous activities to reduce risk of head injury and brain bleeds  - recommended to see your PCP to discuss if you need age-appropriate cancer screenings as a small % of blood clots may be caused by an underlying malignancy  - if any bleeding lasting 30min without stopping, please seek care with your PCP, urgent care, or ED  - reversal agents for most blood thinners are now available and used if you have major bleeding    LIPID MANAGEMENT:   Qualifies for Statin Therapy Based on 2018 ACC/AHA Guidelines: no, Outside age range for consideration of statin therapy and primary prevention per ACC/AHA guidelines.   10-yr ASCVD risk score: N/A  Major ASCVD events: None  High-risk conditions: >64yr old   Currently on Statin: No  Treatment goals: LDL-C <100 (consider non-HDL-C <130, apoB <90)  At goal? N/A  Plan:   - reinforced ongoing TLC measures as noted   - monitor labs, deferred to PCP   Meds:   - none at this time      BLOOD PRESSURE MANAGEMENT:  ACC/AHA (2017) goal <130/80  Home BP at goal: yes  Office BP at goal:  Yes, white coat effect noted   Plan:   - continue healthy diet, activity, weight mgmt   Monitoring:   - routine clinic-based BP measurements at least once annually   Medications: no meds indicated at this time         GLYCEMIC STATUS:  Normal    LIFESTYLE RECOMMENDATIONS:     Smoking:  reports that he has never smoked. He has never used smokeless tobacco.   - continued  complete avoidance of all tobacco products     Physical Activity: continue healthy activity to improve CV fitness, see care instructions for additional details     Weight Management and Nutrition: Dietary plan was discussed with patient at this visit including DASH, low sodium and/or as outlined in care instructions     OTHER:    1) elevated PSA, increased velocity   Seeing urologist, last PSA = 25, rapid increase   Though prostate CA is a lower risk malignancy in its association with VTE, there remains at least a 4-fold increased risk for recurrent VTE with increasing risk with age, stage, mets.   - defer surveillance of PSA and dx/tx options to urology     2) progressive L gluteal/hip pain   Ddx: sciatica, gluteal muscle strain/spasm, hip OA, other musculoskel etiology  From a vasc standpoint ddx includes possible CVI/PTS with low prob PAD or new acutely worsening DVT as already on DOAC therapy   - check STAT LLE venous duplex and TANO to assess for vasc-mediated pain   - check hip xrays, f/u with PCP for further assessment   - may require eval of back and for musculoskel etiologies with PCP if no indications of vascular etiologies     Instructed to follow-up with PCP for remainder of adult medical needs: yes  We will partner with other providers in the management of established vascular disease and cardiometabolic risk factors.    Studies to Be Obtained:  Stat LLE VR duplex, TANO, hip xrays - will call results   Labs to Be Obtained: as noted above     Follow up in: PCP tomorrow,  July with me     Damian Barrett M.D.  [facility]   [facility]   [phone]

## 2021-04-30 ENCOUNTER — OFFICE VISIT (OUTPATIENT)
Dept: MEDICAL GROUP | Facility: MEDICAL CENTER | Age: 81
End: 2021-04-30
Payer: MEDICARE

## 2021-04-30 VITALS
SYSTOLIC BLOOD PRESSURE: 118 MMHG | OXYGEN SATURATION: 98 % | HEIGHT: 70 IN | HEART RATE: 72 BPM | DIASTOLIC BLOOD PRESSURE: 72 MMHG | WEIGHT: 182 LBS | TEMPERATURE: 97.6 F | BODY MASS INDEX: 26.05 KG/M2

## 2021-04-30 DIAGNOSIS — I82.402 ACUTE DEEP VEIN THROMBOSIS (DVT) OF LEFT LOWER EXTREMITY, UNSPECIFIED VEIN (HCC): ICD-10-CM

## 2021-04-30 DIAGNOSIS — R25.2 LEG CRAMPS: ICD-10-CM

## 2021-04-30 DIAGNOSIS — Z00.00 WELL ADULT EXAM: ICD-10-CM

## 2021-04-30 DIAGNOSIS — M54.32 LEFT SCIATIC NERVE PAIN: ICD-10-CM

## 2021-04-30 DIAGNOSIS — M25.552 LEFT HIP PAIN: ICD-10-CM

## 2021-04-30 PROCEDURE — 99213 OFFICE O/P EST LOW 20 MIN: CPT | Performed by: FAMILY MEDICINE

## 2021-04-30 RX ORDER — DIVALPROEX SODIUM 500 MG/1
TABLET, EXTENDED RELEASE ORAL
COMMUNITY
Start: 2021-03-31

## 2021-04-30 RX ORDER — METHOCARBAMOL 750 MG/1
750 TABLET, FILM COATED ORAL 2 TIMES DAILY PRN
Qty: 30 TABLET | Refills: 1 | Status: SHIPPED | OUTPATIENT
Start: 2021-04-30 | End: 2021-05-17

## 2021-04-30 ASSESSMENT — FIBROSIS 4 INDEX: FIB4 SCORE: 1.14

## 2021-05-04 NOTE — PROGRESS NOTES
Subjective:     CC: Diagnoses of Left hip pain, Left sciatic nerve pain, Well adult exam, Leg cramps, and Acute deep vein thrombosis (DVT) of left lower extremity, unspecified vein (HCC) were pertinent to this visit.    HPI: Patient is a 80 y.o. male established patient who presents today with concern for the following.      Left hip pain  Problem.  Present for weeks now.  Pain in the left buttock. Right in the middle of the buttock.  Pain is worst at night, reports significant difficulty sleeping.   + tingling sensation down the leg into the ankle.   Does sometimes have numbness.         Past Medical History:   Diagnosis Date   • Acute deep vein thrombosis (DVT) of left lower extremity (HCC) 01/2021   • Bipolar 1 disorder (HCC)    • Depression        Social History     Tobacco Use   • Smoking status: Never Smoker   • Smokeless tobacco: Never Used   Substance Use Topics   • Alcohol use: Yes     Comment: occasionally   • Drug use: Never       Current Outpatient Medications Ordered in Epic   Medication Sig Dispense Refill   • divalproex ER (DEPAKOTE ER) 500 MG TABLET SR 24 HR TAKE 2 TABLETS (1,000MG) ORALLY EVERY DAY FOR 30 DAYS     • methocarbamol (ROBAXIN) 750 MG Tab Take 1 tablet by mouth 2 times a day as needed (muscle spasm). 30 tablet 1   • ELIQUIS 5 MG Tab TAKE 1 TABLET BY MOUTH TWICE A DAY 60 tablet 2   • Acetaminophen (TYLENOL) 325 MG Cap Tylenol 325 mg capsule   Take by oral route.     • tolterodine ER (DETROL LA) 4 MG CAPSULE SR 24 HR tolterodine ER 4 mg capsule,extended release 24 hr   TAKE 1 CAPSULE BY MOUTH EVERY DAY     • mupirocin (BACTROBAN) 2 % Ointment APPLY TO AFFECTED AREA 3 TIMES A DAY FOR 7 DAYS       No current [facility]-ordered facility-administered medications on file.       Allergies:  Patient has no known allergies.    Health Maintenance: Completed    ROS:  Pulm: no sob, no cough  CV: no chest pain, no palpitations      Objective:       Exam:  /72 (BP Location: Right arm, Patient Position:  "Sitting, BP Cuff Size: Adult long)   Pulse 72   Temp 36.4 °C (97.6 °F) (Temporal)   Ht 1.778 m (5' 10\")   Wt 82.6 kg (182 lb)   SpO2 98%   BMI 26.11 kg/m²  Body mass index is 26.11 kg/m².    General: Normal appearing. No distress.  HEAD: NCAT  EYES: conjunctiva clear, lids without ptosis, pupils equal and reactive to light  EARS: ears normal shape and contour.  MOUTH: normal dentition   Neck:  Normal ROM  Pulmonary: Normal effort. Normal respiratory rate.  Cardiovascular: Well perfused. No LE edema  Neurologic: Grossly normal, no focal deficits  Skin: Warm and dry.  No obvious lesions.  Musculoskeletal: Normal gait and station.   Psych: Normal mood and affect. Alert and oriented x3. Judgment and insight is normal.     Labs: 11/11/19 Results reviewed and discussed with the patient, questions answered.    Assessment & Plan:     80 y.o. male with the following -     1. Left buttock pain  Chronic problem, uncontrolled.  Likely sciatic nerve pain.  Prescription given for muscle relaxer to help at night for sleep.  I did give him a handout on some exercises that can be helpful with his sciatic nerve pain.  Referral placed to physical therapy.    2. Left sciatic nerve pain  - methocarbamol (ROBAXIN) 750 MG Tab; Take 1 tablet by mouth 2 times a day as needed (muscle spasm).  Dispense: 30 tablet; Refill: 1  - REFERRAL TO PHYSICAL THERAPY    3. Well adult exam  - Comp Metabolic Panel; Future  - Lipid Profile; Future  - TSH WITH REFLEX TO FT4; Future  - CBC WITH DIFFERENTIAL; Future    4. Leg cramps  - Comp Metabolic Panel; Future    5. Acute deep vein thrombosis (DVT) of left lower extremity, unspecified vein (HCC)  - CBC WITH DIFFERENTIAL; Future      Return in about 6 months (around 10/30/2021).    Please note that this dictation was created using voice recognition software. I have made every reasonable attempt to correct obvious errors, but I expect that there are errors of grammar and possibly content that I did not " discover before finalizing the note.

## 2021-05-10 ENCOUNTER — OFFICE VISIT (OUTPATIENT)
Dept: MEDICAL GROUP | Facility: MEDICAL CENTER | Age: 81
End: 2021-05-10

## 2021-05-10 ENCOUNTER — HOSPITAL ENCOUNTER (OUTPATIENT)
Dept: LAB | Facility: MEDICAL CENTER | Age: 81
End: 2021-05-10
Attending: FAMILY MEDICINE
Payer: MEDICARE

## 2021-05-10 VITALS
DIASTOLIC BLOOD PRESSURE: 74 MMHG | WEIGHT: 181 LBS | BODY MASS INDEX: 25.91 KG/M2 | HEIGHT: 70 IN | TEMPERATURE: 97.9 F | OXYGEN SATURATION: 93 % | SYSTOLIC BLOOD PRESSURE: 136 MMHG | HEART RATE: 80 BPM

## 2021-05-10 DIAGNOSIS — M54.17 LUMBOSACRAL RADICULOPATHY: ICD-10-CM

## 2021-05-10 DIAGNOSIS — M25.552 LEFT HIP PAIN: ICD-10-CM

## 2021-05-10 DIAGNOSIS — M54.32 LEFT SCIATIC NERVE PAIN: ICD-10-CM

## 2021-05-10 DIAGNOSIS — I82.402 ACUTE DEEP VEIN THROMBOSIS (DVT) OF LEFT LOWER EXTREMITY, UNSPECIFIED VEIN (HCC): ICD-10-CM

## 2021-05-10 DIAGNOSIS — Z00.00 WELL ADULT EXAM: ICD-10-CM

## 2021-05-10 LAB
BASOPHILS # BLD AUTO: 0.4 % (ref 0–1.8)
BASOPHILS # BLD: 0.03 K/UL (ref 0–0.12)
EOSINOPHIL # BLD AUTO: 0.08 K/UL (ref 0–0.51)
EOSINOPHIL NFR BLD: 1.1 % (ref 0–6.9)
ERYTHROCYTE [DISTWIDTH] IN BLOOD BY AUTOMATED COUNT: 44.1 FL (ref 35.9–50)
HCT VFR BLD AUTO: 43.6 % (ref 42–52)
HGB BLD-MCNC: 14.7 G/DL (ref 14–18)
IMM GRANULOCYTES # BLD AUTO: 0.02 K/UL (ref 0–0.11)
IMM GRANULOCYTES NFR BLD AUTO: 0.3 % (ref 0–0.9)
LYMPHOCYTES # BLD AUTO: 2.39 K/UL (ref 1–4.8)
LYMPHOCYTES NFR BLD: 32 % (ref 22–41)
MCH RBC QN AUTO: 30.8 PG (ref 27–33)
MCHC RBC AUTO-ENTMCNC: 33.7 G/DL (ref 33.7–35.3)
MCV RBC AUTO: 91.2 FL (ref 81.4–97.8)
MONOCYTES # BLD AUTO: 0.91 K/UL (ref 0–0.85)
MONOCYTES NFR BLD AUTO: 12.2 % (ref 0–13.4)
NEUTROPHILS # BLD AUTO: 4.05 K/UL (ref 1.82–7.42)
NEUTROPHILS NFR BLD: 54 % (ref 44–72)
NRBC # BLD AUTO: 0 K/UL
NRBC BLD-RTO: 0 /100 WBC
PLATELET # BLD AUTO: 282 K/UL (ref 164–446)
PMV BLD AUTO: 9.9 FL (ref 9–12.9)
RBC # BLD AUTO: 4.78 M/UL (ref 4.7–6.1)
TSH SERPL DL<=0.005 MIU/L-ACNC: 2.37 UIU/ML (ref 0.38–5.33)
WBC # BLD AUTO: 7.5 K/UL (ref 4.8–10.8)

## 2021-05-10 PROCEDURE — 99213 OFFICE O/P EST LOW 20 MIN: CPT | Performed by: FAMILY MEDICINE

## 2021-05-10 PROCEDURE — 85025 COMPLETE CBC W/AUTO DIFF WBC: CPT

## 2021-05-10 PROCEDURE — 36415 COLL VENOUS BLD VENIPUNCTURE: CPT

## 2021-05-10 PROCEDURE — 84443 ASSAY THYROID STIM HORMONE: CPT | Mod: GA

## 2021-05-10 RX ORDER — PREDNISONE 20 MG/1
20 TABLET ORAL DAILY
Qty: 5 TABLET | Refills: 0 | Status: SHIPPED | OUTPATIENT
Start: 2021-05-10 | End: 2021-05-13 | Stop reason: SDUPTHER

## 2021-05-10 ASSESSMENT — FIBROSIS 4 INDEX: FIB4 SCORE: 1.14

## 2021-05-10 NOTE — ASSESSMENT & PLAN NOTE
chornic problem.   Burning in the leg has intensified.   Tried voltaren with no improvement.   Tried some left over oxycodon, this did help with the burning.   Pain is   No saddle anesthesia  No change in urination  + constipation.   Worsening numbness in the leg in the area below the knee on the front side  Likely [id number]

## 2021-05-11 NOTE — PROGRESS NOTES
Subjective:     CC: Diagnoses of Left hip pain and Left sciatic nerve pain were pertinent to this visit.    HPI: Patient is a 80 y.o. male established patient who presents today with the following concern.    Sciatic pain  Chornic problem.   Present for months.  Burning in the leg has intensified.   Tried voltaren with no improvement. Patient was too concerned to try the robaxin for worry over possible side effects.   Scheduled for PT in 2 weeks.  Tried some left over oxycodon (from his wife), this did help with the burning.   Not having the burning pain today.  No saddle anesthesia  No change in urination  + constipation.   Worsening numbness in the leg in the area below the knee on the front side of his leg, this is intermittent.      Past Medical History:   Diagnosis Date   • Acute deep vein thrombosis (DVT) of left lower extremity (HCC) 01/2021   • Bipolar 1 disorder (HCC)    • Depression        Social History     Tobacco Use   • Smoking status: Never Smoker   • Smokeless tobacco: Never Used   Substance Use Topics   • Alcohol use: Yes     Comment: occasionally   • Drug use: Never       Current Outpatient Medications Ordered in Epic   Medication Sig Dispense Refill   • predniSONE (DELTASONE) 20 MG Tab Take 1 tablet by mouth every day for 5 days. 5 tablet 0   • divalproex ER (DEPAKOTE ER) 500 MG TABLET SR 24 HR TAKE 2 TABLETS (1,000MG) ORALLY EVERY DAY FOR 30 DAYS     • ELIQUIS 5 MG Tab TAKE 1 TABLET BY MOUTH TWICE A DAY 60 tablet 2   • tolterodine ER (DETROL LA) 4 MG CAPSULE SR 24 HR tolterodine ER 4 mg capsule,extended release 24 hr   TAKE 1 CAPSULE BY MOUTH EVERY DAY     • methocarbamol (ROBAXIN) 750 MG Tab Take 1 tablet by mouth 2 times a day as needed (muscle spasm). (Patient not taking: Reported on 5/10/2021) 30 tablet 1   • Acetaminophen (TYLENOL) 325 MG Cap Tylenol 325 mg capsule   Take by oral route.     • mupirocin (BACTROBAN) 2 % Ointment APPLY TO AFFECTED AREA 3 TIMES A DAY FOR 7 DAYS       No current  "[facility]-ordered facility-administered medications on file.       Allergies:  Patient has no known allergies.    Health Maintenance: Completed    ROS:  Pulm: no sob, no cough  CV: no chest pain, no palpitations      Objective:       Exam:  /74 (BP Location: Right arm, Patient Position: Sitting, BP Cuff Size: Adult long)   Pulse 80   Temp 36.6 °C (97.9 °F) (Temporal)   Ht 1.778 m (5' 10\")   Wt 82.1 kg (181 lb)   SpO2 93%   BMI 25.97 kg/m²  Body mass index is 25.97 kg/m².    General: Normal appearing. No distress.  HEAD: NCAT  EYES: conjunctiva clear, lids without ptosis, pupils equal and reactive to light  EARS: ears normal shape and contour.  MOUTH: normal dentition   Neck:  Normal ROM  Pulmonary: Normal effort. Normal respiratory rate.  Cardiovascular: Well perfused. No LE edema  Neurologic: Grossly normal, no focal deficits. Normal sensation of the leg. Equal strength bilaterally.  Skin: Warm and dry.  No obvious lesions.  Musculoskeletal: Normal gait and station.   Psych: Normal mood and affect. Alert and oriented x3. Judgment and insight is normal.    Labs: 5/10/21 Results reviewed and discussed with the patient, questions answered.    Assessment & Plan:     80 y.o. male with the following -     1. Left hip pain  2. Left sciatic nerve pain  Chronic problem. Uncontrolled. Slightly worsening although no saddle anesthesia, normal sensation today, no weakness. Intermittent tingling in the L5 dermatone.   Rx for prednisone.   Order placed for MRI  Starting PT in 2 weeks.   - predniSONE (DELTASONE) 20 MG Tab; Take 1 tablet by mouth every day for 5 days.  Dispense: 5 tablet; Refill: 0      No follow-ups on file.    Please note that this dictation was created using voice recognition software. I have made every reasonable attempt to correct obvious errors, but I expect that there are errors of grammar and possibly content that I did not discover before finalizing the note.      "

## 2021-05-14 ENCOUNTER — HOSPITAL ENCOUNTER (OUTPATIENT)
Dept: RADIOLOGY | Facility: MEDICAL CENTER | Age: 81
End: 2021-05-14
Attending: FAMILY MEDICINE
Payer: MEDICARE

## 2021-05-14 DIAGNOSIS — M54.32 LEFT SCIATIC NERVE PAIN: ICD-10-CM

## 2021-05-14 DIAGNOSIS — M54.17 LUMBOSACRAL RADICULOPATHY: ICD-10-CM

## 2021-05-14 PROCEDURE — 72148 MRI LUMBAR SPINE W/O DYE: CPT | Mod: ME

## 2021-05-17 DIAGNOSIS — M54.17 LUMBOSACRAL RADICULOPATHY: ICD-10-CM

## 2021-05-17 DIAGNOSIS — M54.32 LEFT SCIATIC NERVE PAIN: ICD-10-CM

## 2021-05-17 NOTE — PROGRESS NOTES
Spoke with patient regarding MRI results.   He is doing well regarding pain with the steroids.   No emergent/surgical intervention needed.   Referral placed for physiatry for possible injections.   Patient has PT scheduled.   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 He will discuss this with his vascular medicine specialist in July at his next appt. No [place] aneurysm noted.

## 2021-05-28 ENCOUNTER — PHYSICAL THERAPY (OUTPATIENT)
Dept: PHYSICAL THERAPY | Facility: MEDICAL CENTER | Age: 81
End: 2021-05-28
Attending: FAMILY MEDICINE
Payer: MEDICARE

## 2021-05-28 DIAGNOSIS — M54.32 LEFT SCIATIC NERVE PAIN: ICD-10-CM

## 2021-05-28 PROCEDURE — 97162 PT EVAL MOD COMPLEX 30 MIN: CPT

## 2021-05-28 PROCEDURE — 97110 THERAPEUTIC EXERCISES: CPT

## 2021-05-28 ASSESSMENT — ENCOUNTER SYMPTOMS
PAIN SCALE: 0
PAIN SCALE AT HIGHEST: 9
QUALITY: BURNING
PAIN SCALE AT LOWEST: 0

## 2021-05-28 NOTE — OP THERAPY EVALUATION
"  Outpatient Physical Therapy  INITIAL EVALUATION    [facility] Outpatient Physical Therapy  [address]  [place] [address]  Phone:  [phone]  Fax:  [phone]    Date of Evaluation: 05/28/2021    Patient: Laurent Infante  YOB: 1940  MRN: [id number]     Referring Provider: Roxy Ramon M.D.  [address] [facility]  [address]  [place] [address]   Referring Diagnosis Left sciatic nerve pain [M54.32]     Time Calculation  Start time: 1447  Stop time: 1546 Time Calculation (min): 59 minutes         Chief Complaint: Leg Problem    Visit Diagnoses     ICD-10-CM   1. Left sciatic nerve pain  M54.32       Date of onset of impairment: No data found    Subjective:   History of Present Illness:     Mechanism of injury:  Patient is a 80 year old male with a PMH including: Bipolar disorder, anemia, DVT (6 month txt per pt), chronic venous insufficiency, SNHL B ears, vision changes, tremor, depression, hernia repair, B RCR. Pt endorses chronic hx of leg cramps.    Pt presents to therapy with complaints of LE pain which initiated in April/end of May 43911 without clear mechanism of injury. Reporting \"intense burning in buttock and leg;\" with difficulty finding comfortable position. Pt received prednisone which relieved x2 weeks but is now returning. Endorses intermittent pain in LLE which extends from hip to proximal ankle. Initially pain waking at night; more recently burning in PM to AM for first 1-2 hours and resolves. Endorses intermittent weakness with amb; endorses falls due to this; reports he \"falls gracefully.\" Pt presents with walking stick, has started using since LE pain. Pt unable to identify if numbness/tingling occurring or just leg cramps (chronic) vs. LE Burning.     Currently denies changes in bowel and bladder, saddle anesthesia, significant weight changes, chills/night sweats, nausea and vomiting, and unexplained fatigue. Denies history of cancer. Pt " "consents to evaluation and treatment today.           Prior level of function:  Previously using no AD, no limitations prior to incident  Sleep disturbance: supine, R sidelying; has hx of leg cramps which waken pt in evenings; no pillows b/w knees.  Pain:     Current pain ratin    At best pain ratin    At worst pain ratin    Location:  L hip to L proximal ankle     Quality:  Burning    Progression:  Worsening    Pain Comments::  Aggravating: Prolonged walking >20 min (weakness rather than pain), waking in morning    Relieving: oxycodone from wife  Diagnostic Tests:     Diagnostic Tests Comments:  21 Lumbar MRI:  IMPRESSION:     1.  L4-5 mild-moderate spinal stenosis     2.  Multilevel foraminal stenoses describing the findings section     3.  Minimal edematous endplate change adjacent to L5-S1     4.   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    Activities of Daily Living:     Patient reported ADL status: Patient's current daily routine includes:  Work: Retired; prev working at [employer]  Hobbies: Reading, gardening  Exercise: 10,000 steps daily       Patient Goals:     Patient goals for therapy:  Decreased pain    Other patient goals:  \"Return to normal\"       Past Medical History:   Diagnosis Date   • Acute deep vein thrombosis (DVT) of left lower extremity (HCC) 2021   • Bipolar 1 disorder (HCC)    • Depression      Past Surgical History:   Procedure Laterality Date   • DENTAL EXTRACTION(S)     • HERNIA REPAIR     • SHOULDER SURGERY      bilateral rotator cuff repair     Social History     Tobacco Use   • Smoking status: Never Smoker   • Smokeless tobacco: Never Used   Substance Use Topics   • Alcohol use: Yes     Comment: occasionally     Family and Occupational History     Socioeconomic History   • Marital status:      Spouse name: Not on file   • Number of children: Not on file   • Years of education: Not on file "   • Highest education level: Not on file   Occupational History   • Not on file       Objective     Postural Observations  Seated posture: poor    Additional Postural Observation Details  Forward head and rounded shoulders; sacral sitting    Neurological Testing     Reflexes   Left   Patellar (L4): trace (1+)  Achilles (S1): trace (1+)    Right   Patellar (L4): trace (1+)  Achilles (S1): trace (1+)    Myotome testing   Lumbar (left)   L1 (hip flexors): 5  L2 (hip flexors): 5  L3 (knee extensors): 5  L4 (ankle dorsiflexors): 5  L5 (great toe extension): 3-  S1 (ankle plantar flexors): 5    Lumbar (right)   All right lumbar myotomes within normal limits    Dermatome testing   Lumbar (left)   All left lumbar dermatomes intact    Lumbar (right)   All right lumbar dermatomes intact    Additional Neurological Details  Pt reporting intact sensation to B plantar aspect of feet      Palpation   Left   No palpable tenderness to the lumbar paraspinals.   Tenderness of the gluteus medius and piriformis.     Right   No palpable tenderness to the lumbar paraspinals and piriformis.     Active Range of Motion     Lumbar   Flexion: decreased  Extension: decreased  Left lateral flexion: decreased  Right lateral flexion: decreased    Additional Active Range of Motion Details  No reproduction of s/s with l/s AROM    Joint Play   Spine     Central PA [place]        L1: hypomobile       L2: hypomobile       L3: hypomobile       L4: hypomobile       L5: hypomobile       S1: hypomobile        Tests     Left Hip   Negative JORGE, FADIR and scour.   SLR: Positive.     Right Hip   Negative JORGE and FADIR.   SLR: Negative.         Therapeutic Exercises (CPT 64316):     1. Pt education, core strength and stability, HO provided    2. HS active stretching, 10x, hep    3. Piriformis stretches, 1x30 sec ea, hep    4. Pt education, re: anatomy review and discussion of MRI with visual aide/anatomical model    19. UPOC: 7/23/21      Therapeutic  Exercise Summary: Pt performed these exercises with instruction and SPV.  Provided handout with these exercises for daily HEP.        Time-based treatments/modalities:    Physical Therapy Timed Treatment Charges  Therapeutic exercise minutes (CPT 55859): 14 minutes      Assessment, Response and Plan:   Assessment details:  Patient is a pleasant and cooperative 80 year old male who presents with 1 month hx of intermittent L LE burning pain and intermittent weakness with amb with insidious onset. Reporting chronic hx of LE cramps with unknown NEEL (currently no treatment). Pt reporting positive response to prednisone pack with abolished s/s, however, s/s returning following completion of medication. Did have MRI, remarkable for: L4-5 mild-moderate spinal stenosis; no red flags but endorses hx of urinary incontinence -unchanged.      Exam findings suggestive of radiculopathy with L5 weakness. Additional presentation of poor postural awareness, core and proximal pelvic girdle weakness, + L SLS for buttocks pain, TTP L piriformis and L glute med with reproduction of L buttocks pain. Unable to reproduce full LLE burning pain but will continue to monitor in subsequent sessions.  Pt may benefit from skilled physical therapy in order to address above impairments in order to improve QOL and return to reported ADL's.     Barriers to therapy:  Comorbidities and hearing  Prognosis: fair    Goals:   Short Term Goals:   1. Pt will be independent with written HEP.  2. Pt will participate in formal balance assessment.  3. Pt will complete LEFS.  4. Pt will report no falls.  Short term goal time span:  2-4 weeks      Long Term Goals:    1. Pt will be independent with written HEP.  2. Pt will have a sig improvement in LEFS score >/= IAN/MCID.  3. Pt will have sig improvement in balance assessment pending initial testing.  4. Pt will report no falls.  5. Pt will be able to walk for 30 min or greater without symptoms in order to improve  QOL.  6. Pt will report 50% or greater improvement in sleep hygiene.  Long term goal time span:  6-8 weeks    Plan:   Therapy options:  Physical therapy treatment to continue  Planned therapy interventions:  Neuromuscular Re-education (CPT 34063), Gait Training (CPT 53802), E Stim Unattended (CPT 11093), Mechanical Traction (CPT 90571), Manual Therapy (CPT 03400), Therapeutic Exercise (CPT 80646) and Therapeutic Activities (CPT 59515)  Frequency:  2x week  Duration in weeks:  8  Discussed with:  Patient  Plan details:  UPOC: 7/23/21          Functional Assessment Used: TBD        Referring provider co-signature:  I have reviewed this plan of care and my co-signature certifies the need for services.    Certification Period: 05/28/2021 to 7/23/21    Physician Signature: ________________________________ Date: ______________

## 2021-06-01 ENCOUNTER — PHYSICAL THERAPY (OUTPATIENT)
Dept: PHYSICAL THERAPY | Facility: MEDICAL CENTER | Age: 81
End: 2021-06-01
Attending: FAMILY MEDICINE
Payer: MEDICARE

## 2021-06-01 DIAGNOSIS — M54.32 LEFT SCIATIC NERVE PAIN: ICD-10-CM

## 2021-06-01 DIAGNOSIS — R26.89 IMBALANCE: ICD-10-CM

## 2021-06-01 DIAGNOSIS — R26.9 GAIT ABNORMALITY: ICD-10-CM

## 2021-06-01 PROCEDURE — 97110 THERAPEUTIC EXERCISES: CPT

## 2021-06-01 NOTE — OP THERAPY DAILY TREATMENT
Outpatient Physical Therapy  DAILY TREATMENT     [facility] Outpatient Physical Therapy  [address]  [place] [address]  Phone:  [phone]  Fax:  [phone]    Date: 06/01/2021    Patient: Laurent Infante  YOB: 1940  MRN: [id number]     Time Calculation    Start time: 1556  Stop time: 1635 Time Calculation (min): 39 minutes         Chief Complaint: Leg Problem    Visit #: 2    SUBJECTIVE:  My wife is very impressed because I'm doing. The pain is down in the front of the calf. The weakness is still in the left leg. Everything is about the same.   I haven't used the walking stick.         OBJECTIVE:  Current objective measures:       B glute med weakness  Fwd posturing during stance and gait    Therapeutic Exercises (CPT 72469):     1. Pt education, core strength and stability, HO provided    2. HS active stretching, 10x, reviewed     3. Piriformis stretches, 1x30 sec ea, reviewed     4. Pt education, re: anatomy review and discussion of MRI with visual aide/anatomical model    5. Bridge on swissball, 2 min , hep; 3/4 AROM with fatigue; LBP during with time    6. HS rolls+TrA brace, 20x , hep    7. Pt education, re: core engagement using belly button to l/s, cough and exhale through pursed lips to demo    8. Toy soldier, 3x20 , slight increase in buttock pain during, none after completion ex; glute med weakness with difficulty maintaining abducted feet; hep with instructions for chair and counter for safety    19. UPOC: 7/23/21      Therapeutic Exercise Summary: Access Code: [email or web address]  URL: [email or web address]  Date: 06/01/2021  Prepared by: Matthias Regalado    Exercises  •Bridge with Arms at Sides and Feet on Swiss Ball - 2 x daily - 7 x weekly - 3 sets - endurance hold  •Seated Knee Flexion - 1 x daily - 7 x weekly - 10 reps - 3 sets  •Supine Hamstring Curl on Swiss Ball - 2 x daily - 7 x weekly - 2 sets - 10 reps      Pt performed these exercises with  instruction and SPV.  Provided handout with these exercises for daily HEP.        Time-based treatments/modalities:    Physical Therapy Timed Treatment Charges  Therapeutic exercise minutes (CPT 63379): 39 minutes    Goals:   Short Term Goals:   1. Pt will be independent with written HEP.  2. Pt will participate in formal balance assessment.  3. Pt will complete LEFS.  4. Pt will report no falls.  Short term goal time span:  2-4 weeks      Long Term Goals:    1. Pt will be independent with written HEP.  2. Pt will have a sig improvement in LEFS score >/= IAN/MCID.  3. Pt will have sig improvement in balance assessment pending initial testing.  4. Pt will report no falls.  5. Pt will be able to walk for 30 min or greater without symptoms in order to improve QOL.  6. Pt will report 50% or greater improvement in sleep hygiene.  Long term goal time span:  6-8 weeks      Pain rating (1-10) before treatment:  Back and LE occasional pain   Pain rating (1-10) after treatment:  0    ASSESSMENT:   Response to treatment:   Glute med weakness as evidenced by toy soldier and fwd posturing during gait which may continue to benefit from further strengthening due to complaints with amb. Formal balance assessment also warranted in upcoming sessions; (pt present today without walking stick).    PLAN/RECOMMENDATIONS:   Plan for treatment: therapy treatment to continue next visit.  Planned interventions for next visit: continue with current treatment.  Assess response to new hep; formal balance assessment in subsequent sessions

## 2021-06-08 ENCOUNTER — PHYSICAL THERAPY (OUTPATIENT)
Dept: PHYSICAL THERAPY | Facility: MEDICAL CENTER | Age: 81
End: 2021-06-08
Attending: FAMILY MEDICINE
Payer: MEDICARE

## 2021-06-08 DIAGNOSIS — R26.89 IMBALANCE: ICD-10-CM

## 2021-06-08 DIAGNOSIS — R26.9 GAIT ABNORMALITY: ICD-10-CM

## 2021-06-08 DIAGNOSIS — M54.32 LEFT SCIATIC NERVE PAIN: ICD-10-CM

## 2021-06-08 PROCEDURE — 97012 MECHANICAL TRACTION THERAPY: CPT

## 2021-06-08 PROCEDURE — 97110 THERAPEUTIC EXERCISES: CPT

## 2021-06-08 NOTE — OP THERAPY DAILY TREATMENT
Outpatient Physical Therapy  DAILY TREATMENT     [facility] Outpatient Physical Therapy  [address]  [place] [address]  Phone:  [phone]  Fax:  [phone]    Date: 06/08/2021    Patient: Laurent Infante  YOB: 1940  MRN: [id number]     Time Calculation    Start time: 1448  Stop time: 1532 Time Calculation (min): 44 minutes         Chief Complaint: Leg Problem    Visit #: 3    SUBJECTIVE:  I have less frequent pain. It still happens every day but less frequently. I still get burning but it's not as intense. I see a physiatrist this week.       OBJECTIVE:  Current objective measures:   Lower Extremity Functional Scale Total: 73.75      Manual l/s traction:  B glute med weakness  Fwd posturing during stance and gait-improved erect posturing today    Therapeutic Exercises (CPT 09030):     1. Review of hep, hep as below     2. HS active stretching, 10x, reviewed     5. Bridge on swissball, 2 min , reviewed    6. HS rolls+TrA brace, 20x , reviewed , re: core engagement using belly button to l/s, cough and exhale through pursed lips to demo    8. Toy soldier, 3x20 , slight increase in buttock pain during, none after completion ex; glute med weakness with difficulty maintaining abducted feet; hep with instructions for chair and counter for safety    9. Running man , 15x    19. UPOC: 7/23/21      Therapeutic Exercise Summary: Access Code: [email or web address]  URL: [email or web address]  Date: 06/01/2021  Prepared by: Matthias Regalado    Exercises  •Bridge with Arms at Sides and Feet on Swiss Ball - 2 x daily - 7 x weekly - 3 sets - endurance hold  •Seated Knee Flexion - 1 x daily - 7 x weekly - 10 reps - 3 sets  •Supine Hamstring Curl on Swiss Ball - 2 x daily - 7 x weekly - 2 sets - 10 reps      Pt performed these exercises with instruction and SPV.  Provided handout with these exercises for daily HEP.      Therapeutic Treatments and Modalities:     1. Mechanical Traction  (CPT 54520), 80/40 l/s traction with mhp x 15 min    Time-based treatments/modalities:    Physical Therapy Timed Treatment Charges  Therapeutic exercise minutes (CPT 02164): 29 minutes    Goals:   Short Term Goals:   1. Pt will be independent with written HEP.  2. Pt will participate in formal balance assessment.  3. Pt will complete LEFS.  4. Pt will report no falls.  Short term goal time span:  2-4 weeks      Long Term Goals:    1. Pt will be independent with written HEP.  2. Pt will have a sig improvement in LEFS score >/= IAN/MCID.  3. Pt will have sig improvement in balance assessment pending initial testing.  4. Pt will report no falls.  5. Pt will be able to walk for 30 min or greater without symptoms in order to improve QOL.  6. Pt will report 50% or greater improvement in sleep hygiene.  Long term goal time span:  6-8 weeks      Pain rating (1-10) before treatment:  Back and LE occasional pain   Pain rating (1-10) after treatment:  0    ASSESSMENT:   Response to treatment:   Improved erect posturing during gait today with pt reporting decrease in burning intensity and frequency with ADL's. Presenting to therapy without s/s; no increase in baseline with manual traction and ex in session today. Trial of mech traction due to no neg response from manual traction.    Formal balance assessment also warranted in upcoming sessions; (pt present today without walking stick).      PLAN/RECOMMENDATIONS:   Plan for treatment: therapy treatment to continue next visit.  Planned interventions for next visit: continue with current treatment.  Assess response to traction; formal balance assessment in subsequent sessions

## 2021-06-09 ENCOUNTER — OFFICE VISIT (OUTPATIENT)
Dept: PHYSICAL MEDICINE AND REHAB | Facility: MEDICAL CENTER | Age: 81
End: 2021-06-09
Payer: MEDICARE

## 2021-06-09 VITALS
HEART RATE: 72 BPM | TEMPERATURE: 97.3 F | DIASTOLIC BLOOD PRESSURE: 68 MMHG | WEIGHT: 180.56 LBS | HEIGHT: 70 IN | SYSTOLIC BLOOD PRESSURE: 130 MMHG | OXYGEN SATURATION: 98 % | BODY MASS INDEX: 25.85 KG/M2

## 2021-06-09 DIAGNOSIS — M54.16 LUMBAR RADICULOPATHY: ICD-10-CM

## 2021-06-09 DIAGNOSIS — M48.061 LUMBAR FORAMINAL STENOSIS: ICD-10-CM

## 2021-06-09 DIAGNOSIS — Z79.01 CHRONIC ANTICOAGULATION: ICD-10-CM

## 2021-06-09 DIAGNOSIS — M48.061 SPINAL STENOSIS OF LUMBAR REGION, UNSPECIFIED WHETHER NEUROGENIC CLAUDICATION PRESENT: ICD-10-CM

## 2021-06-09 PROCEDURE — 99204 OFFICE O/P NEW MOD 45 MIN: CPT | Performed by: PHYSICAL MEDICINE & REHABILITATION

## 2021-06-09 ASSESSMENT — FIBROSIS 4 INDEX: FIB4 SCORE: 1.06

## 2021-06-09 ASSESSMENT — PAIN SCALES - GENERAL: PAINLEVEL: NO PAIN

## 2021-06-09 ASSESSMENT — PATIENT HEALTH QUESTIONNAIRE - PHQ9
CLINICAL INTERPRETATION OF PHQ2 SCORE: 1
SUM OF ALL RESPONSES TO PHQ QUESTIONS 1-9: 5
5. POOR APPETITE OR OVEREATING: 0 - NOT AT ALL

## 2021-06-09 NOTE — PROGRESS NOTES
New patient note    Physiatry (physical medicine and  Rehabilitation), interventional spine and sports medicine    Date of Service: 06/09/2021    Chief complaint:   Chief Complaint   Patient presents with   • New Patient     Back pain       HISTORY    HPI: Laurent Infante 81 y.o. male who presents today for evaluation of low back pain.  He reports that about 4 weeks ago, he had pain that started radiating into the left leg.  From what he reports, no particular injury.  This had involved burning pain, but now burning in the left leg is better. Pain is a 2/10 on the NRS today.  The pain was 8/10 on the NRS when his pain first started.    Prednisone was helpful.  PT has been helpful.  He has had 3 sessions so far and feels hopeful about this.    Sitting, standing and walking are nonpainful. Walking is an issue if he walks too far.  Gets some fatigue. No trips, falls or stumbles.  No bowel or bladder changes.      Medical records review:  I reviewed the note from the referring provider Roxy Ramon M.D.     Previous treatments:    Physical Therapy: Yes    Medications the patient is tried: Tylenol 1000mg po bid    Previous interventions: none    Previous surgeries to relieve the above pain:  none      ROS:   Gen: sensitive to cold  Eyes: dizziness  ENT: tinnitus, hearing aids  : urgency, sees [facility] [place]  Psych: depression  Red Flags ROS:   Fever, Chills, Sweats: Denies  Involuntary Weight Loss: Denies  Bladder Incontinence: Denies  Bowel Incontinence: Denies  Saddle Anesthesia: Denies    All other systems reviewed and negative.       PMHx:   Past Medical History:   Diagnosis Date   • Acute deep vein thrombosis (DVT) of left lower extremity (HCC) 01/2021   • Bipolar 1 disorder (HCC)    • Depression        PSHx:   Past Surgical History:   Procedure Laterality Date   • DENTAL EXTRACTION(S)     • HERNIA REPAIR     • SHOULDER SURGERY      bilateral rotator cuff repair       Family history   Family History    Problem Relation Age of Onset   • Cancer Mother    • Stroke Father    • Psychiatric Illness Sister          Medications:   Current Outpatient Medications   Medication   • divalproex ER (DEPAKOTE ER) 500 MG TABLET SR 24 HR   • ELIQUIS 5 MG Tab   • tolterodine ER (DETROL LA) 4 MG CAPSULE SR 24 HR   • Acetaminophen (TYLENOL) 325 MG Cap   • mupirocin (BACTROBAN) 2 % Ointment     No current facility-administered medications for this visit.       Allergies:   No Known Allergies    Social Hx:   Social History     Socioeconomic History   • Marital status:      Spouse name: Not on file   • Number of children: Not on file   • Years of education: Not on file   • Highest education level: Not on file   Occupational History   • Not on file   Tobacco Use   • Smoking status: Never Smoker   • Smokeless tobacco: Never Used   Vaping Use   • Vaping Use: Never used   Substance and Sexual Activity   • Alcohol use: Yes     Alcohol/week: 0.6 oz     Types: 1 Glasses of wine per week     Comment: at night   • Drug use: Never   • Sexual activity: Not on file   Other Topics Concern   •  Service Yes   • Blood Transfusions No   • Caffeine Concern No   • Occupational Exposure No   • Hobby Hazards No   • Sleep Concern No   • Stress Concern Yes   • Weight Concern No   • Special Diet Yes   • Back Care No   • Exercise Yes   • Bike Helmet No   • Seat Belt Yes   • Self-Exams No   Social History Narrative   • Not on file     Social Determinants of Health     Financial Resource Strain:    • Difficulty of Paying Living Expenses:    Food Insecurity:    • Worried About Running Out of Food in the Last Year:    • Ran Out of Food in the Last Year:    Transportation Needs:    • Lack of Transportation (Medical):    • Lack of Transportation (Non-Medical):    Physical Activity:    • Days of Exercise per Week:    • Minutes of Exercise per Session:    Stress:    • Feeling of Stress :    Social Connections:    • Frequency of Communication with  "Friends and Family:    • Frequency of Social Gatherings with Friends and Family:    • Attends [employer] Services:    • Active Member of Clubs or Organizations:    • Attends Club or Organization Meetings:    • Marital Status:    Intimate Partner Violence:    • Fear of Current or Ex-Partner:    • Emotionally Abused:    • Physically Abused:    • Sexually Abused:          EXAMINATION     Physical Exam:   Vitals: /68 (BP Location: Right arm, Patient Position: Sitting, BP Cuff Size: Small adult)   Pulse 72   Temp 36.3 °C (97.3 °F) (Temporal)   Ht 1.778 m (5' 10\")   Wt 81.9 kg (180 lb 8.9 oz)   SpO2 98%     Constitutional:   Body Habitus: Body mass index is 25.91 kg/m².  Cooperation: Fully cooperates with exam  Appearance: Well-groomed, well-nourished, not disheveled, in no acute distress    Eyes: No scleral icterus, no proptosis     ENT -no obvious auditory deficits, wearing a face mask    Skin -no rashes or lesions noted     Respiratory-  breathing comfortable on room air, no audible wheezing    Cardiovascular- capillary refills less than 2 seconds. No lower extremity edema is noted.     Psychiatric- alert and oriented ×3. Normal affect.     Gait - normal gait, no use of ambulatory device, nonantalgic. The patient can toe walk with ease. The patient can heel walk with ease..     Musculoskeletal -   Cervical spine   Inspection: No deformities of the skin over the cervical spine. No rashes or lesions.    decreased A/P ROM in all directions, without pain      Mild atrophy over the right supraspinatus greater than left, otherwise signs of muscular atrophy in bilateral upper extremities     Thoracic/Lumbar Spine/Sacral Spine/Hips   Inspection: No evidence of atrophy in bilateral lower extremities throughout     ROM: full  AROM with flexion, extension, lateral flexion, and rotation bilaterally, without pain     Palpation:   No tenderness to palpation in midline at T1-T12 levels. No tenderness to palpation in the " left and right of the midline T1-L5  palpation over SI joint: positive left, negative right    palpation over buttock: positive left, negative right    palpation in hip or over the greater trochanters: negative bilaterally      Lumbar spine Special tests  Neuro tension  Straight leg test negative bilaterally      HIP  Range of motion in the hips is within normal limits in internal rotation, external rotation bilaterally    SI joint tests  Observation patient sits on one buttocks: Negative  JORGE test negative bilaterally       Neuro     Key points for the international standards for neurological classification of spinal cord injury (ISNCSCI) to light touch.     Dermatome R L   C4 2 2   C5 2 2   C6 2 2   C7 2 2   C8 2 2   T1 2 2   T2 2 2   L2 2 2   L3 2 2   L4 2 2   L5 2 2   S1 2 2   S2 2 2       Motor Exam Upper Extremities   ? Myotome R L   Shoulder flexion C5 5 5   Elbow flexion C5 5 5   Wrist extension C6 5 5   Elbow extension C7 5 5   Finger flexion C8 5 5   Finger abduction T1 5 5       Motor Exam Lower Extremities    ? Myotome R L   Hip flexion L2 5 5   Knee extension L3 5 5   Ankle dorsiflexion L4 5 4   Toe extension L5 5 4   Ankle plantarflexion S1 5 5       Johnson’s sign negative bilaterally   Babinski sign negative bilaterally   Clonus of the ankle negative bilaterally     Reflexes  ?  R L   Biceps  2+ 2+   Brachioradialis  2+ 2+   Patella  2+ 2+   Achilles   2+ 1+       MEDICAL DECISION MAKING    Medical records review: see under HPI section.     DATA    Labs:   Lab Results   Component Value Date/Time    SODIUM 140 11/11/2019 12:57 PM    POTASSIUM 4.0 11/11/2019 12:57 PM    CHLORIDE 107 11/11/2019 12:57 PM    CO2 26 11/11/2019 12:57 PM    ANION 7.0 11/11/2019 12:57 PM    GLUCOSE 89 11/11/2019 12:57 PM    BUN 17 11/11/2019 12:57 PM    CREATININE 0.90 11/11/2019 12:57 PM    CALCIUM 9.0 11/11/2019 12:57 PM    ASTSGOT 13 11/11/2019 12:57 PM    ALTSGPT 12 11/11/2019 12:57 PM    TBILIRUBIN 0.5 11/11/2019 12:57  PM    ALBUMIN 3.9 11/11/2019 12:57 PM    TOTPROTEIN 6.2 11/11/2019 12:57 PM    GLOBULIN 2.3 11/11/2019 12:57 PM    AGRATIO 1.7 11/11/2019 12:57 PM       No results found for: PROTHROMBTM, INR     Lab Results   Component Value Date/Time    WBC 7.5 05/10/2021 01:16 PM    RBC 4.78 05/10/2021 01:16 PM    HEMOGLOBIN 14.7 05/10/2021 01:16 PM    HEMATOCRIT 43.6 05/10/2021 01:16 PM    MCV 91.2 05/10/2021 01:16 PM    MCH 30.8 05/10/2021 01:16 PM    MCHC 33.7 05/10/2021 01:16 PM    MPV 9.9 05/10/2021 01:16 PM    NEUTSPOLYS 54.00 05/10/2021 01:16 PM    LYMPHOCYTES 32.00 05/10/2021 01:16 PM    MONOCYTES 12.20 05/10/2021 01:16 PM    EOSINOPHILS 1.10 05/10/2021 01:16 PM    BASOPHILS 0.40 05/10/2021 01:16 PM        No results found for: HBA1C     Imaging: I personally reviewed following images, these are my reads  MRI lumbar spine 05/14/2021  At L1-2, disc bulge without significant central or foraminal stenosis  At L2-3, disc bulge, facet arthropathy left greater than right foraminal stenosis mild.  At L3-4, disc bulge, facet arthropathy with left lateral disc protrusion.  Moderate left and mild right foraminal stenosis  At L4-5, moderate foraminal stenosis bilaterally, moderate lumbar spinal stenosis  At L5-S1, annular disc bulge and left lateral disc protrusion with moderate right foraminal stenosis      IMAGING radiology reads. I reviewed the following radiology reads         Results for orders placed during the hospital encounter of 05/14/21    MR-LUMBAR SPINE-W/O    Impression  1.  L4-5 mild-moderate spinal stenosis    2.  Multilevel foraminal stenoses describing the findings section    3.  Minimal edematous endplate change adjacent to L5-S1    4.   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 Lumbar radiculopathy  M54.16   2. Lumbar foraminal stenosis  M48.061   3. Spinal stenosis of lumbar region, unspecified  whether neurogenic claudication present  M48.061   4. Chronic anticoagulation  Z79.01           ASSESSMENT:  Laurent Infante 81 y.o. male seen for above     Laurent was seen today for new patient.    Diagnoses and all orders for this visit:    Lumbar radiculopathy    Lumbar foraminal stenosis    Spinal stenosis of lumbar region, unspecified whether neurogenic claudication present    Chronic anticoagulation      1. DIscussed findings on the MRI lumbar spine as well as the fact that he has started to have some improvement of symptoms with PT.  Encuraged him to continue with PT.  2. We have agreed to hold off on use of medications for pain at this time.  We can reassess if symptoms return.    3. Discussed his chronic anticoagulation status, which would be a factor to consider if interventions are needed.  No injections planned at this time.    Follow-up: Return in about 2 months (around 8/9/2021).      Thank you very much for asking me to participate in Laurent Infante's care.  Please contact me with any questions or concerns.    Please note that this dictation was created using voice recognition software. I have made every reasonable attempt to correct obvious errors but there may be errors of grammar and content that I may have overlooked prior to finalization of this note.      Braxton Lazo MD  Physical Medicine and Rehabilitation  Interventional Spine and Sports Physiatry  [facility]           Roxy Diaz M.D.

## 2021-06-12 ENCOUNTER — HOSPITAL ENCOUNTER (OUTPATIENT)
Dept: LAB | Facility: MEDICAL CENTER | Age: 81
End: 2021-06-12
Attending: FAMILY MEDICINE
Payer: MEDICARE

## 2021-06-12 DIAGNOSIS — Z00.00 WELL ADULT EXAM: ICD-10-CM

## 2021-06-12 DIAGNOSIS — R25.2 LEG CRAMPS: ICD-10-CM

## 2021-06-12 PROBLEM — M48.061 SPINAL STENOSIS OF LUMBAR REGION: Status: ACTIVE | Noted: 2021-06-12

## 2021-06-12 LAB
ALBUMIN SERPL BCP-MCNC: 4.1 G/DL (ref 3.2–4.9)
ALBUMIN/GLOB SERPL: 2 G/DL
ALP SERPL-CCNC: 56 U/L (ref 30–99)
ALT SERPL-CCNC: 15 U/L (ref 2–50)
ANION GAP SERPL CALC-SCNC: 7 MMOL/L (ref 7–16)
AST SERPL-CCNC: 17 U/L (ref 12–45)
BILIRUB SERPL-MCNC: 0.6 MG/DL (ref 0.1–1.5)
BUN SERPL-MCNC: 13 MG/DL (ref 8–22)
CALCIUM SERPL-MCNC: 9.3 MG/DL (ref 8.5–10.5)
CHLORIDE SERPL-SCNC: 107 MMOL/L (ref 96–112)
CHOLEST SERPL-MCNC: 183 MG/DL (ref 100–199)
CO2 SERPL-SCNC: 26 MMOL/L (ref 20–33)
CREAT SERPL-MCNC: 0.89 MG/DL (ref 0.5–1.4)
FASTING STATUS PATIENT QL REPORTED: NORMAL
GLOBULIN SER CALC-MCNC: 2.1 G/DL (ref 1.9–3.5)
GLUCOSE SERPL-MCNC: 117 MG/DL (ref 65–99)
HDLC SERPL-MCNC: 56 MG/DL
LDLC SERPL CALC-MCNC: 112 MG/DL
POTASSIUM SERPL-SCNC: 5.4 MMOL/L (ref 3.6–5.5)
PROT SERPL-MCNC: 6.2 G/DL (ref 6–8.2)
SODIUM SERPL-SCNC: 140 MMOL/L (ref 135–145)
TRIGL SERPL-MCNC: 75 MG/DL (ref 0–149)

## 2021-06-12 PROCEDURE — 80053 COMPREHEN METABOLIC PANEL: CPT

## 2021-06-12 PROCEDURE — 36415 COLL VENOUS BLD VENIPUNCTURE: CPT | Mod: GA

## 2021-06-12 PROCEDURE — 80061 LIPID PANEL: CPT | Mod: GA

## 2021-06-15 ENCOUNTER — PHYSICAL THERAPY (OUTPATIENT)
Dept: PHYSICAL THERAPY | Facility: MEDICAL CENTER | Age: 81
End: 2021-06-15
Attending: FAMILY MEDICINE
Payer: MEDICARE

## 2021-06-15 DIAGNOSIS — R26.9 GAIT ABNORMALITY: ICD-10-CM

## 2021-06-15 DIAGNOSIS — R26.89 IMBALANCE: ICD-10-CM

## 2021-06-15 DIAGNOSIS — M54.32 LEFT SCIATIC NERVE PAIN: ICD-10-CM

## 2021-06-15 PROCEDURE — 97110 THERAPEUTIC EXERCISES: CPT

## 2021-06-15 NOTE — OP THERAPY DAILY TREATMENT
"  Outpatient Physical Therapy  DAILY TREATMENT     [facility] Outpatient Physical Therapy  [address]  [place] [address]  Phone:  [phone]  Fax:  [phone]    Date: 06/15/2021    Patient: Laurent Infante  YOB: 1940  MRN: [id number]     Time Calculation    Start time: 1520  Stop time: 1601 Time Calculation (min): 41 minutes         Chief Complaint: Difficulty Walking, Loss Of Balance, and Back Problem    Visit #: 4    SUBJECTIVE:  I have less frequent pain. It still happens every day but less frequently. I still get burning but it's not as intense. I see a physiatrist this week. I still get pain when I get out of bed in the morning but it's less. Dr. Lazo thinks I would benefit from more therapy but she asks who is going to pay for it?      OBJECTIVE:  Current objective measures:          Decreased hip extension B    Fwd posturing during stance and gait-improved erect posture    Therapeutic Exercises (CPT 34810):     1. Review of hep, core strength and stability, hep as below     9. Running man , 15x, reviewed; VC's for set up due to 3/4 turn vs. SL; decreased hip extension observed B    10. DKTC , 10x, hep for AM    11. Patient care coordination, re: review of annual medicare funds    12. Standing hip extension with counter support, x10 ea with emphasis on erect posture, \"I have a felling in my glute but it's not burning\"    19. UPOC: 7/23/21      Therapeutic Exercise Summary: Access Code: [email or web address]  URL: [email or web address]  Date: 06/01/2021  Prepared by: Matthias Regalado    Exercises  •Bridge with Arms at Sides and Feet on Swiss Ball - 2 x daily - 7 x weekly - 3 sets - endurance hold  •Seated Knee Flexion - 1 x daily - 7 x weekly - 10 reps - 3 sets  •Supine Hamstring Curl on Swiss Ball - 2 x daily - 7 x weekly - 2 sets - 10 reps      Pt performed these exercises with instruction and SPV.  Provided handout with these exercises for daily " "HEP.      Therapeutic Treatments and Modalities:     1. Mechanical Traction (CPT 53759), 85/45 l/s traction with mhp x 15 min    Time-based treatments/modalities:    Physical Therapy Timed Treatment Charges  Therapeutic exercise minutes (CPT 34040): 26 minutes    Goals:   Short Term Goals:   1. Pt will be independent with written HEP.  2. Pt will participate in formal balance assessment.  3. Pt will complete LEFS.  4. Pt will report no falls.  Short term goal time span:  2-4 weeks      Long Term Goals:    1. Pt will be independent with written HEP.  2. Pt will have a sig improvement in LEFS score >/= IAN/MCID.  3. Pt will have sig improvement in balance assessment pending initial testing.  4. Pt will report no falls.  5. Pt will be able to walk for 30 min or greater without symptoms in order to improve QOL.  6. Pt will report 50% or greater improvement in sleep hygiene.  Long term goal time span:  6-8 weeks      Pain rating (1-10) before treatment:  Back and LE occasional pain   Pain rating (1-10) after treatment:  0    ASSESSMENT:   Response to treatment:   Decreased hip extension B likely resulting in continued complaints of glute burning with ambulation; good tolerance with \"sensation\" only without glute burning after standing hip extension ex. Light increase in mech traction without pain complaints following.  Will conduct formal balance assessment next session due to goals set; pt may additionally benefit from assessment of psoas to see if restriction or tightness preventing additional range at hips.    Few min on pt care coordination, pt has met 363.38 of his therapy threshold.        PLAN/RECOMMENDATIONS:   Plan for treatment: therapy treatment to continue next visit.  Planned interventions for next visit: continue with current treatment.  Formal balance assessment; assess psoas B     "

## 2021-06-17 ENCOUNTER — APPOINTMENT (OUTPATIENT)
Dept: RADIOLOGY | Facility: MEDICAL CENTER | Age: 81
End: 2021-06-17
Attending: OTOLARYNGOLOGY
Payer: MEDICARE

## 2021-06-17 DIAGNOSIS — H90.3 SENSORY HEARING LOSS, BILATERAL: ICD-10-CM

## 2021-06-17 PROCEDURE — 700117 HCHG RX CONTRAST REV CODE 255: Performed by: OTOLARYNGOLOGY

## 2021-06-17 PROCEDURE — A9576 INJ PROHANCE MULTIPACK: HCPCS | Performed by: OTOLARYNGOLOGY

## 2021-06-17 PROCEDURE — 70553 MRI BRAIN STEM W/O & W/DYE: CPT | Mod: MH

## 2021-06-17 RX ADMIN — GADOTERIDOL 15 ML: 279.3 INJECTION, SOLUTION INTRAVENOUS at 08:54

## 2021-06-22 ENCOUNTER — PHYSICAL THERAPY (OUTPATIENT)
Dept: PHYSICAL THERAPY | Facility: MEDICAL CENTER | Age: 81
End: 2021-06-22
Attending: FAMILY MEDICINE
Payer: MEDICARE

## 2021-06-22 DIAGNOSIS — R26.89 IMBALANCE: ICD-10-CM

## 2021-06-22 DIAGNOSIS — M54.32 LEFT SCIATIC NERVE PAIN: ICD-10-CM

## 2021-06-22 DIAGNOSIS — R26.9 GAIT ABNORMALITY: ICD-10-CM

## 2021-06-22 PROCEDURE — 97110 THERAPEUTIC EXERCISES: CPT

## 2021-06-22 ASSESSMENT — BALANCE ASSESSMENTS
LONG VERSION TOTAL SCORE (MAX 56): 51
LOOK OVER LEFT AND RIGHT SHOULDERS WHILE STANDING: 4
TRANSFERS: 4
PICK UP OBJECT FROM THE FLOOR FROM A STANDING POSITION: 4
STANDING UNSUPPORTED WITH EYES CLOSED: 4
STANDING ON ONE LEG: 1
STANDING TO SITTING: 4
PLACE ALTERNATE FOOT ON STEP OR STOOL WHILE STANDING UNSUPPORTED: 3
STANDING UNSUPPORTED WITH FEET TOGETHER: 4
SITTING UNSUPPORTED: 4
LONG VERSION TOTAL SCORE (MAX 56): 51
TURN 360 DEGREES: 4
STANDING UNSUPPORTED ONE FOOT IN FRONT: 3
REACHING FORWARD WITH OUTSTRETCHED ARM WHILE STANDING: 4
SITTING TO STANDING: 4
STANDING UNSUPPORTED: 4

## 2021-06-22 NOTE — OP THERAPY PROGRESS SUMMARY
"  Outpatient Physical Therapy  PROGRESS SUMMARY NOTE      [facility] Outpatient Physical Therapy  [address]  [place] [address]  Phone:  [phone]  Fax:  [phone]    Date of Visit: 06/22/2021    Patient: Laurent Infante  YOB: 1940  MRN: [id number]     Referring Provider: Roxy Ramon M.D.  [address]  [address]  [place] [address]   Referring Diagnosis Sciatica, left side [M54.32]     Visit Diagnoses     ICD-10-CM   1. Left sciatic nerve pain  M54.32   2. Gait abnormality  R26.9   3. Imbalance  R26.89       Rehab Potential: good    Progress Report Period:     Functional Assessment Used  Tobias Balance Total Score (0-56): 51  (<45/56 signifies high fall risk)      Objective Findings and Assessment:   Patient progression towards goals: Pt has been seen for 5 visits of skilled physical therapy and reporting significant improvements in glute pain and burning symptoms which he previously experiencing with prolonged walking and waking in the mornings. Endorses sleep hygiene has improved and no longer experiencing interrupted sleep due to burning complaints. Pt continues to complain of imbalance; notices this on unlevel surfaces. Pt's strength improving from current hep. Received 51/56 on TOBIAS balance score; he is not considered high fall risk per this test. Pt tested most poorly on SLS, foot taps on step, and tandem stance. May benefit from a few sessions to provide hep to address these impairments.    Further reporting \"imbalance\" and \"feeling off\" when he is driving, in addition to \"dizziness with quick head turns.\" Pt endorses dizziness associated with his imbalance; no nausea, no visual complaints (last eye test June 2021-\"it was fine.\") Does demonstrate impaired convergence (R eye) and some slight nystagmus with R lateral smooth pursuit, VOR intact, saccades intact, head thrust test intact, Modified SL [place]-hallpike negative B. Pt denying any recent " "changes in medications. Of note, pt does not currently have a referral to address balance on file. Pt would benefit from follow up with MD to assess for medical origin for dizziness; if vestibular component, there is a physical therapy vestibular specialist located at alternate [facility] location who may be of service to patient.      Objective findings and assessment details: Impaired convergence (R eye) and some slight nystagmus with R lateral smooth pursuit, VOR intact, saccades intact, head thrust test intact, Modified SL [place]-hallpike negative B        Goals:   Short Term Goals:   Goals:   Short Term Goals:   1. Pt will be independent with written HEP. (Met)  2. Pt will participate in formal balance assessment. (Met)  3. Pt will complete LEFS. (NT)  4. Pt will report no falls. (Met)  Short term goal time span:  2-4 weeks    Short term goal time span:  2-4 weeks      Long Term Goals:    Long Term Goals:    1. Pt will be independent with written HEP. (Met)  2. Pt will have a sig improvement in LEFS score >/= IAN/MCID. (NT)  3. Pt will have sig improvement in balance assessment pending initial testing. (Ongoing)  4. Pt will report no falls. (Met)  5. Pt will be able to walk for 30 min or greater without symptoms in order to improve QOL. (Partially met; infreq \"burning with amb)  6. Pt will report 50% or greater improvement in sleep hygiene. (Met; pt reporting infreq and decrease in burning s/s and glute pain in AM)  Long term goal time span:  6-8 weeks    Long term goal time span:  6-8 weeks    Plan:   Planned therapy interventions:  E Stim Unattended (CPT 40576), Neuromuscular Re-education (CPT 23754) and Therapeutic Exercise (CPT 55861)  Frequency:  1x week  Duration in weeks:  6  Plan details:  UPOC: 8/6/21          Referring provider co-signature:  I have reviewed this plan of care and my co-signature certifies the need for services.     Certification Period: 06/22/2021 to 8/6/21    Physician Signature: " ________________________________ Date: ______________

## 2021-06-22 NOTE — OP THERAPY DAILY TREATMENT
Outpatient Physical Therapy  DAILY TREATMENT     [facility] Outpatient Physical Therapy  [address]  [place] [address]  Phone:  [phone]  Fax:  [phone]    Date: 06/22/2021    Patient: Laurent Infante  YOB: 1940  MRN: [id number]     Time Calculation    Start time: 1517  Stop time: 1553 Time Calculation (min): 36 minutes         Chief Complaint: Back Problem and Hip Problem    Visit #: 5    SUBJECTIVE:  I'm not having burning pain anymore. I'm not getting much burning when I walk, I get it rarely if at all.    I would say balance is still an issue. When I move my head I feel dizzy. The dizziness lasts seconds. I sometimes feel imbalance when I'm driving. Last eye check on 6/3/21 and it was fine.       OBJECTIVE:  Current objective measures:   Murray Balance Total Score (0-56): 51      See progress note    Therapeutic Exercises (CPT 93333):     1. Review of hep, core strength and stability, hep as below     2. Goals assessment    3. Balance assessment    19. UPOC: 7/23/21      Time-based treatments/modalities:    Physical Therapy Timed Treatment Charges  Therapeutic exercise minutes (CPT 11305): 36 minutes          Pain rating (1-10) before treatment:  0  Pain rating (1-10) after treatment:  0    ASSESSMENT:   Response to treatment:   See progress note        PLAN/RECOMMENDATIONS:   Plan for treatment: See progress note

## 2021-06-23 ENCOUNTER — TELEPHONE (OUTPATIENT)
Dept: PHYSICAL THERAPY | Facility: MEDICAL CENTER | Age: 81
End: 2021-06-23
Payer: MEDICARE

## 2021-06-29 NOTE — TELEPHONE ENCOUNTER
Patient has also sent [employer] message- I responded to patient via [employer] to schedule an appointment .

## 2021-07-06 ENCOUNTER — OFFICE VISIT (OUTPATIENT)
Dept: MEDICAL GROUP | Facility: MEDICAL CENTER | Age: 81
End: 2021-07-06
Payer: MEDICARE

## 2021-07-06 VITALS
DIASTOLIC BLOOD PRESSURE: 60 MMHG | HEART RATE: 62 BPM | TEMPERATURE: 97.6 F | SYSTOLIC BLOOD PRESSURE: 122 MMHG | HEIGHT: 70 IN | BODY MASS INDEX: 25.77 KG/M2 | OXYGEN SATURATION: 99 % | WEIGHT: 180 LBS

## 2021-07-06 DIAGNOSIS — R73.01 ELEVATED FASTING BLOOD SUGAR: ICD-10-CM

## 2021-07-06 DIAGNOSIS — Z23 NEED FOR VACCINATION: ICD-10-CM

## 2021-07-06 DIAGNOSIS — R60.0 LEG EDEMA: ICD-10-CM

## 2021-07-06 DIAGNOSIS — I82.412 ACUTE DEEP VEIN THROMBOSIS (DVT) OF FEMORAL VEIN OF LEFT LOWER EXTREMITY (HCC): ICD-10-CM

## 2021-07-06 DIAGNOSIS — R26.89 IMBALANCE: ICD-10-CM

## 2021-07-06 PROCEDURE — 99214 OFFICE O/P EST MOD 30 MIN: CPT | Mod: 25 | Performed by: FAMILY MEDICINE

## 2021-07-06 PROCEDURE — 90750 HZV VACC RECOMBINANT IM: CPT | Performed by: FAMILY MEDICINE

## 2021-07-06 PROCEDURE — 90471 IMMUNIZATION ADMIN: CPT | Performed by: FAMILY MEDICINE

## 2021-07-06 RX ORDER — DIVALPROEX SODIUM 500 MG/1
TABLET, EXTENDED RELEASE ORAL
COMMUNITY
Start: 2021-06-25

## 2021-07-06 ASSESSMENT — FIBROSIS 4 INDEX: FIB4 SCORE: 1.25

## 2021-07-06 NOTE — ASSESSMENT & PLAN NOTE
Chronic problem. Struggling with balance for about a year. Sometimes has dizziness, sometimes just an issue with imbalance. Would like to continue with PT.

## 2021-07-08 NOTE — PROGRESS NOTES
Subjective:     CC: Diagnoses of Need for vaccination, Imbalance, Acute deep vein thrombosis (DVT) of femoral vein of left lower extremity (HCC), Leg edema, and Elevated fasting blood sugar were pertinent to this visit.    HPI: Patient is a 81 y.o. male established patient who presents today to discuss the following.      Imbalance  Chronic problem. Struggling with balance for about a year. Sometimes has dizziness, sometimes just an issue with imbalance. Would like to continue with PT (which he has been doing for sciatic pain) and would like to see the same therapist.     Deep vein thrombosis (HCC)  Chronic problem. No pain. Seems to be improving. Continues on eliquis. Has f/u with Dr. Barrett.  They will discuss if he can come back off after the 6 months.    Leg edema  Chronic problem  Mild  bilateral    Elevated fasting blood sugar  New problem.  Fasting blood sugar was 117.  Normal at 89 at last check in November 2019.  He does report that he has been eating more sorbet recently, homemade by his wife.  He has had no weight gain.      Past Medical History:   Diagnosis Date   • Acute deep vein thrombosis (DVT) of left lower extremity (HCC) 01/2021   • Bipolar 1 disorder (HCC)    • Depression        Social History     Tobacco Use   • Smoking status: Never Smoker   • Smokeless tobacco: Never Used   Vaping Use   • Vaping Use: Never used   Substance Use Topics   • Alcohol use: Yes     Alcohol/week: 0.6 oz     Types: 1 Glasses of wine per week     Comment: at night   • Drug use: Never       Current Outpatient Medications Ordered in Epic   Medication Sig Dispense Refill   • divalproex ER (DEPAKOTE ER) 500 MG TABLET SR 24 HR TAKE 2 TABLETS (1,000MG) ORALLY EVERY DAY FOR 30 DAYS     • divalproex ER (DEPAKOTE ER) 500 MG TABLET SR 24 HR TAKE 2 TABLETS (1,000MG) ORALLY EVERY DAY FOR 30 DAYS     • ELIQUIS 5 MG Tab TAKE 1 TABLET BY MOUTH TWICE A DAY 60 tablet 2   • tolterodine ER (DETROL LA) 4 MG CAPSULE SR 24 HR tolterodine  "ER 4 mg capsule,extended release 24 hr   TAKE 1 CAPSULE BY MOUTH EVERY DAY     • Acetaminophen (TYLENOL) 325 MG Cap Tylenol 325 mg capsule   Take by oral route.     • mupirocin (BACTROBAN) 2 % Ointment APPLY TO AFFECTED AREA 3 TIMES A DAY FOR 7 DAYS       No current Epic-ordered facility-administered medications on file.       Allergies:  Patient has no known allergies.    Health Maintenance: Completed    ROS:  Gen: no fevers/chill, no changes in weight  Eyes: no changes in vision  ENT: no sore throat, no hearing loss, no bloody nose  Pulm: no sob, no cough  CV: no chest pain, no palpitations  GI: no nausea/vomiting, no diarrhea  : no dysuria  MSk: no myalgias  Skin: no rash  Neuro: no headaches, no numbness/tingling  Heme/Lymph: no easy bruising      Objective:       Exam:  /60 (BP Location: Right arm, Patient Position: Sitting, BP Cuff Size: Adult long)   Pulse 62   Temp 36.4 °C (97.6 °F) (Temporal)   Ht 1.778 m (5' 10\")   Wt 81.6 kg (180 lb)   SpO2 99%   BMI 25.83 kg/m²  Body mass index is 25.83 kg/m².    Pulmonary: Normal effort. Normal respiratory rate.  Cardiovascular: Well perfused.  Bilateral 1+ lower extremity edema    Labs: 6/12/21 Results reviewed and discussed with the patient, questions answered.    Assessment & Plan:     81 y.o. male with the following -     1. Need for vaccination  - Shingrix Vaccine    2. Imbalance  New to discuss.  Referral placed to physical therapy for imbalance.  May consider vestibular therapy as well depending on how this goes.  - REFERRAL TO PHYSICAL THERAPY    3. Acute deep vein thrombosis (DVT) of femoral vein of left lower extremity (HCC)  Chronic problem, still on Eliquis.  No pain.  Feels like things are improving.  Has significant lower extremity varicosities.  He is followed by the vascular medicine clinic, they are going to discuss duration of treatment at next visit.    4. Leg edema  Chronic problem, likely due to venous insufficiency.  Recommended " compression hose.    5. Elevated fasting blood sugar  New problem.  Fasting blood sugar 117.  Advised patient to work on reducing carbs/sugars.  Normal triglycerides at 75.  And repeat labs in about a year.  Fasting blood sugar remains elevated we can certainly do an A1c at that time.  Last fasting blood sugar 1 year ago was only 87.    Return in about 6 months (around 1/6/2022) for Annual wellness visit.    Please note that this dictation was created using voice recognition software. I have made every reasonable attempt to correct obvious errors, but I expect that there are errors of grammar and possibly content that I did not discover before finalizing the note.

## 2021-07-16 ENCOUNTER — OFFICE VISIT (OUTPATIENT)
Dept: VASCULAR LAB | Facility: MEDICAL CENTER | Age: 81
End: 2021-07-16
Attending: FAMILY MEDICINE
Payer: MEDICARE

## 2021-07-16 VITALS
DIASTOLIC BLOOD PRESSURE: 68 MMHG | SYSTOLIC BLOOD PRESSURE: 121 MMHG | HEIGHT: 70 IN | HEART RATE: 71 BPM | WEIGHT: 182 LBS | BODY MASS INDEX: 26.05 KG/M2

## 2021-07-16 DIAGNOSIS — I82.812 ACUTE SUPERFICIAL VENOUS THROMBOSIS OF LOWER EXTREMITY, LEFT: ICD-10-CM

## 2021-07-16 DIAGNOSIS — I82.412 ACUTE DEEP VEIN THROMBOSIS (DVT) OF FEMORAL VEIN OF LEFT LOWER EXTREMITY (HCC): ICD-10-CM

## 2021-07-16 DIAGNOSIS — Z79.01 CHRONIC ANTICOAGULATION: ICD-10-CM

## 2021-07-16 DIAGNOSIS — I87.2 CHRONIC VENOUS INSUFFICIENCY: ICD-10-CM

## 2021-07-16 DIAGNOSIS — R97.20 ELEVATED PSA: ICD-10-CM

## 2021-07-16 PROCEDURE — 99214 OFFICE O/P EST MOD 30 MIN: CPT | Performed by: FAMILY MEDICINE

## 2021-07-16 PROCEDURE — 99212 OFFICE O/P EST SF 10 MIN: CPT

## 2021-07-16 ASSESSMENT — ENCOUNTER SYMPTOMS
DIZZINESS: 0
TREMORS: 0
DIARRHEA: 0
BRUISES/BLEEDS EASILY: 0
FOCAL WEAKNESS: 0
ABDOMINAL PAIN: 0
CHILLS: 0
HEADACHES: 0
NAUSEA: 0
FEVER: 0
PALPITATIONS: 0
VOMITING: 0
WEAKNESS: 0
COUGH: 0
SHORTNESS OF BREATH: 0
HEMOPTYSIS: 0
CLAUDICATION: 0
MYALGIAS: 0
WHEEZING: 0
SEIZURES: 0

## 2021-07-16 ASSESSMENT — FIBROSIS 4 INDEX: FIB4 SCORE: 1.25

## 2021-07-16 NOTE — PROGRESS NOTES
FOLLOW-UP VASCULAR ANTICOAGULATION VISIT  Subjective:   Laurent Infante is a  male who presents today 07/16/21 for   Chief Complaint   Patient presents with   • Follow-Up   Initially referred by Roxy Ramon M.D. for length of therapy (LOT) determination and management of anticoagulation in context of acute venothromboembolic disease    HPI:     VTE disease / Anticoagulation:   Current symptoms:  Improved L hip pain, s/p PT.  No new issues    Current antithrombotic agent: eliquis 5mg BID   Complications: none, tolerating well, no bleeding or bruising.  Had bleeding from R varicose vein. Stopped spontaneous   Date of initiation of anticoagulation: 1/8/21   Adherence: reports complete, no missed doses     Pertinent VTE pmhx:   Date of Diagnosis: 1/8/21  Type of Venous thromboembolic disease (VTE): acute LLE SVT and DVT at SPJ into CFV  Type of imaging: duplex   Preceding/presenting symptoms: prior to date of dx had LLE pain with swelling that she awoke with acutely from thigh to distal leg.  ROM limited at knee from swelling.  Seen by PCP and had duplex   Antithrombotic therapy at time of VTE event: no  VTE tx course: Eliquis 10mg BID x 7d, now eliquis 5mg BID   Any personal VTE hx? Yes, Details: acute/chronic LLE SVT, varicosities BLE - long-standing   Any family VTE hx? No   UNPROVOKED VS PROVOKED:   Recent surgery ? No  Recent trauma ? No  Smoker?  reports that he has never smoked. He has never used smokeless tobacco.    Extended travel? No  Other periods of immobility? No  Other risk factors for VTE disease:  no  MEN:  Colorectal CA screening:yes              Prostate CA screening: most recently 25, reports many years elevated, seeing urology (Dr. Hagen) - pending f/u and ongoing care.     Hx of Testosterone therapy: no  Hypercoaguability work-up completed?  no (see assessment for details)    Current Outpatient Medications:   •  divalproex ER, TAKE 2 TABLETS (1,000MG) ORALLY EVERY DAY FOR 30 DAYS,  "Taking  •  divalproex ER, TAKE 2 TABLETS (1,000MG) ORALLY EVERY DAY FOR 30 DAYS, Taking  •  Eliquis, TAKE 1 TABLET BY MOUTH TWICE A DAY, Taking  •  tolterodine ER, tolterodine ER 4 mg capsule,extended release 24 hr  TAKE 1 CAPSULE BY MOUTH EVERY DAY, Taking  •  Tylenol, Tylenol 325 mg capsule  Take by oral route., Taking  •  mupirocin, APPLY TO AFFECTED AREA 3 TIMES A DAY FOR 7 DAYS, Taking     No Known Allergies     Social History     Tobacco Use   • Smoking status: Never Smoker   • Smokeless tobacco: Never Used   Vaping Use   • Vaping Use: Never used   Substance Use Topics   • Alcohol use: Yes     Alcohol/week: 0.6 oz     Types: 1 Glasses of wine per week     Comment: at night   • Drug use: Never       DIET AND EXERCISE:  Weight Change:stable   BMI Readings from Last 5 Encounters:   07/16/21 26.11 kg/m²   07/06/21 25.83 kg/m²   06/09/21 25.91 kg/m²   05/10/21 25.97 kg/m²   04/30/21 26.11 kg/m²     Diet: common adult  Exercise: moderate regular exercise program     Review of Systems   Constitutional: Negative for chills, fever and malaise/fatigue.   Respiratory: Negative for cough, hemoptysis, shortness of breath and wheezing.    Cardiovascular: Positive for leg swelling (improving). Negative for chest pain, palpitations and claudication.   Gastrointestinal: Negative for abdominal pain, diarrhea, nausea and vomiting.   Musculoskeletal: Negative for joint pain and myalgias.   Skin: Negative for itching and rash.   Neurological: Negative for dizziness, tremors, focal weakness, seizures, weakness and headaches.   Endo/Heme/Allergies: Does not bruise/bleed easily.        Objective:     Vitals:    07/16/21 1356   BP: 121/68   BP Location: Left arm   Patient Position: Sitting   BP Cuff Size: Adult   Pulse: 71   Weight: 82.6 kg (182 lb)   Height: 1.778 m (5' 10\")      BP Readings from Last 5 Encounters:   07/16/21 121/68   07/06/21 122/60   06/09/21 130/68   05/10/21 136/74   04/30/21 118/72      Body mass index is 26.11 " kg/m².  Physical Exam  Vitals reviewed.   Constitutional:       Appearance: Normal appearance.   HENT:      Head: Normocephalic and atraumatic.      Nose: Nose normal.      Mouth/Throat:      Mouth: Mucous membranes are moist.      Pharynx: Oropharynx is clear.   Eyes:      Extraocular Movements: Extraocular movements intact.      Conjunctiva/sclera: Conjunctivae normal.   Cardiovascular:      Rate and Rhythm: Normal rate and regular rhythm.      Pulses: Normal pulses.           Carotid pulses are 2+ on the right side and 2+ on the left side.       Radial pulses are 2+ on the right side and 2+ on the left side.        Dorsalis pedis pulses are 2+ on the right side and 2+ on the left side.        Posterior tibial pulses are 2+ on the right side and 2+ on the left side.      Heart sounds: Normal heart sounds.      Comments:    Spider telangectasia:       RLE:  None      LLE: none   Varicosities:           RLE: none      LLE: diffuse lower limb   Corona phlebectatica:      RLE:  None        LLE:  mild  Cording:         RLE:  None     LLE: None     Pulmonary:      Effort: Pulmonary effort is normal.      Breath sounds: Normal breath sounds.   Abdominal:      General: Abdomen is flat. Bowel sounds are normal.      Palpations: Abdomen is soft.   Musculoskeletal:      Cervical back: Normal range of motion and neck supple.      Right lower leg: No edema.      Left lower leg: No edema.   Skin:     General: Skin is warm and dry.      Capillary Refill: Capillary refill takes less than 2 seconds.          Neurological:      General: No focal deficit present.      Mental Status: He is alert and oriented to person, place, and time. Mental status is at baseline.   Psychiatric:         Mood and Affect: Mood normal.         Behavior: Behavior normal.         Lab Results   Component Value Date    CHOLSTRLTOT 183 06/12/2021     (H) 06/12/2021    HDL 56 06/12/2021    TRIGLYCERIDE 75 06/12/2021           Lab Results   Component  Value Date    SODIUM 140 06/12/2021    POTASSIUM 5.4 06/12/2021    CHLORIDE 107 06/12/2021    CO2 26 06/12/2021    GLUCOSE 117 (H) 06/12/2021    BUN 13 06/12/2021    CREATININE 0.89 06/12/2021    IFAFRICA >60 06/12/2021    IFNOTAFR >60 06/12/2021        Lab Results   Component Value Date    WBC 7.5 05/10/2021    RBC 4.78 05/10/2021    HEMOGLOBIN 14.7 05/10/2021    HEMATOCRIT 43.6 05/10/2021    MCV 91.2 05/10/2021    MCH 30.8 05/10/2021    MCHC 33.7 05/10/2021    MPV 9.9 05/10/2021         Ref. Range 1/7/2021 15:46   D-Dimer Screen Latest Ref Range: 0.00 - 0.50 ug/mL (FEU) 1.42 (H)      Ref. Range 2/8/2019 13:52 11/11/2019 12:57 8/26/2020 09:51 12/16/2020 15:56 1/28/2021 15:20   Prostatic Specific Antigen Tot Latest Ref Range: 0.00 - 4.00 ng/mL 6.00 (H)   17.30 (H) 25.10 (H)     VASCULAR IMAGING:     Last EKG:   No results found for this or any previous visit.     BLE venous 1/8/21   1. Acute DVT in the LEFT saphenofemoral junction, extending into the common    femoral vein.   2. Acute SVT from the LEFT ankle to thigh.   3. No RIGHT DVT or SVT.    TANO/art duplex 4/2021    1. No evidence of hemodynamically significant stenosis in the Left lower    extremity arteries visualized on this exam.     LLE venous 4/29/21    1.  Subacute to chronic appearing superficial thrombus in the the LEFT    greater saphenous vein approximately 3 mm from the CFV junction.   2.  No evidence of Left lower extremity DVT.     Hip xray 4/29/21   1.  No radiographic evidence of acute traumatic injury.   2.  Findings are consistent with moderate osteoarthritis.     Medical Decision Making:  Today's Assessment / Status / Plan:     1. Acute deep vein thrombosis (DVT) of femoral vein of left lower extremity (HCC)  D-DIMER   2. Acute superficial venous thrombosis of lower extremity, left     3. Chronic venous insufficiency     4. Chronic anticoagulation     5. Elevated PSA       PATIENT TYPE: Primary Prevention    Etiology of Established CVD if  Present:   1) acute LLE DVT, SVT, 1/2021     ANTITHROMBOTIC THERAPY:  Anti-Platelet/Anti-Coagulant Tx recommended: yes  Indication: acute LLE DVT, SVT, 1/2021   Date of initiation: 1/8/21  HAS-BLED bleeding risk calc ([email or web address]): 1 pt, 3.4%, low risk  Thrombophilia/hypercoag evaluation:  not recommended  Factors to consider for indefinite OAC: Unprovoked VTE event and Male sex   Last CBC, BMP: 2019  Expected duration: completed 6mo therapy (7/2021), reviewed options for extension 6-12mo with 1/2 dose DOAC vs ASA - reviewed risks/benefits.  He will call back with decision after review with his wife - has clear indications for extension  - Elevated PSA and any dx of prostate CA will also factor into this, so will need further input from Urology, as active malignancy and worsening stage will further increase risk of recurrent VTE if off OAC.   Antithrombotic therapy plan:  - continue eliquis 5mg BID - will c/b to decide if transition to 2.5mg BID vs ASA 81mg daily   - will base therapy upon his choice  - consider repeat D-dimer test 1mo after stopping eliquis for further risk analysis   - counseled on signs and symptoms of acute VTE that require seeking prompt attention in the ED to include shortness of breath, chest pain, pain with deep inhalation, acute leg swelling and/or pain in calf or leg   - elevate legs as much as possible, use compression stockings/socks if directed by your provider  - Avoid hormonal therapies including estrogen or testosterone-containing meds, or raloxifene or tamoxifene (commonly used for osteoporosis)  - Avoid sedentary periods  - continue complete avoidance of tobacco products  - if having any invasive procedure,please make sure the doctor knows of your history of blood clots and current anticoagulation status  - avoid Aspirin and anti-inflammatories (eg. Advil, ibuprofen, Aleve, naproxen, etc) while anticoagulated   - avoid skiing or other dangerous activities to reduce risk of head  injury and brain bleeds  - recommended to see your PCP to discuss if you need age-appropriate cancer screenings as a small % of blood clots may be caused by an underlying malignancy  - if any bleeding lasting 30min without stopping, please seek care with your PCP, urgent care, or ED  - reversal agents for most blood thinners are now available and used if you have major bleeding    LIPID MANAGEMENT:   Qualifies for Statin Therapy Based on 2018 ACC/AHA Guidelines: no, Outside age range for consideration of statin therapy and primary prevention per ACC/AHA guidelines.   10-yr ASCVD risk score: N/A  Major ASCVD events: None  High-risk conditions: >64yr old   Currently on Statin: No  Treatment goals: LDL-C <100 (consider non-HDL-C <130, apoB <90)  At goal? N/A  Plan:   - reinforced ongoing TLC measures as noted   - monitor labs, deferred to PCP   Meds:   - none at this time      BLOOD PRESSURE MANAGEMENT:  ACC/AHA (2017) goal <130/80  Home BP at goal: yes  Office BP at goal:  Yes, white coat effect noted   Plan:   - continue healthy diet, activity, weight mgmt   Monitoring:   - routine clinic-based BP measurements at least once annually   Medications: no meds indicated at this time         GLYCEMIC STATUS:  Normal    LIFESTYLE RECOMMENDATIONS:     Smoking:  reports that he has never smoked. He has never used smokeless tobacco.   - continued complete avoidance of all tobacco products     Physical Activity: continue healthy activity to improve CV fitness, see care instructions for additional details     Weight Management and Nutrition: Dietary plan was discussed with patient at this visit including DASH, low sodium and/or as outlined in care instructions     OTHER:    1) elevated PSA, increased velocity   Though prostate CA is a lower risk malignancy in its association with VTE, there remains at least a 4-fold increased risk for recurrent VTE with increasing risk with age, stage, mets.   - defer surveillance of PSA and  dx/tx options to urology     2) progressive L gluteal/hip pain , resolved   - neg TANO/art duplex, neg repeat venous duplex, hip xrays with moderate OA   - ongoing PT    Instructed to follow-up with PCP for remainder of adult medical needs: yes  We will partner with other providers in the management of established vascular disease and cardiometabolic risk factors.    Studies to Be Obtained:  None   Labs to Be Obtained: as noted above     Follow up in:   Will plan next appt based upon phone call      Damian Barrett M.D.  [facility]   [facility]   [phone]

## 2021-07-16 NOTE — PATIENT INSTRUCTIONS
Pleased call after talking with your wife and let me know if you need a new eliquis prescription.

## 2021-07-28 RX ORDER — ASPIRIN 81 MG/1
81 TABLET ORAL DAILY
Qty: 30 TABLET | COMMUNITY

## 2021-08-02 ENCOUNTER — PHYSICAL THERAPY (OUTPATIENT)
Dept: PHYSICAL THERAPY | Facility: MEDICAL CENTER | Age: 81
End: 2021-08-02
Attending: FAMILY MEDICINE
Payer: MEDICARE

## 2021-08-02 DIAGNOSIS — R26.89 IMBALANCE: ICD-10-CM

## 2021-08-02 PROCEDURE — 97162 PT EVAL MOD COMPLEX 30 MIN: CPT

## 2021-08-02 PROCEDURE — 97116 GAIT TRAINING THERAPY: CPT

## 2021-08-02 SDOH — ECONOMIC STABILITY: GENERAL: QUALITY OF LIFE: GOOD

## 2021-08-02 ASSESSMENT — ACTIVITIES OF DAILY LIVING (ADL): AMBULATION_WITHOUT_ASSISTIVE_DEVICE: INDEPENDENT

## 2021-08-02 ASSESSMENT — GAIT ASSESSMENTS
GAIT WITH HORIZONTAL HEAD TURNS: NORMAL: PERFORMS HEAD TURNS SMOOTHLY WITH NO CHANGE IN GAIT
STEP OVER OBSTACLE: NORMAL: IS ABLE TO STEP OVER THE BOX WITHOUT CHANGING GAIT SPEED, NO EVIDENCE OF IMBALANCE
GAIT LEVEL SURFACE: NORMAL: WALKS 20', NO ASSISTIVE DEVICES, GOOD SPEED, NO EVIDENCE FOR IMBALANCE, NORMAL GAIT PATTERN
CHANGE IN GAIT SPEED: NORMAL: ABLE TO SMOOTHLY CHANGE WALKING SPEED WITHOUT LOSS OF BALANCE OR GAIT DEVIATION. SHOWS A SIGNIFICANT DIFFERENCE IN WALKING SPEEDS BETWEEN NORMAL, FAST AND SLOW SPEEDS
GAIT AND PIVOT TURN: NORMAL: PIVOT TURNS SAFELY WITHIN 3 SECONDS AND STOPS QUICKLY WITH NO LOSS OF BALANCE
STEPS: MILD IMPAIRMENT: ALTERNATING FEET, MUST USE RAIL
GAIT WITH VERTICAL HEAD TURNS: MILD IMPAIRMENT: PERFORMS HEAD TURNS SMOOTHLY WITH SLIGHT CHANGE IN GAIT VELOCITY, IE, MINOR DISRUPTION TO SMOOTH GAIT PATH OR USES WALKING AID
DYNAMIC GAIT INDEX SCORE: 91.67
STEP AROUND OBSTACLES: NORMAL: IS ABLE TO WALK AROUND CONES SAFELY WITHOUT CHANGING GAIT SPEED, NO EVIDENCE OF IMBALANCE

## 2021-08-02 ASSESSMENT — ENCOUNTER SYMPTOMS
PAIN SCALE: 0
PAIN SCALE AT LOWEST: 0
PAIN SCALE AT HIGHEST: 0

## 2021-08-02 NOTE — OP THERAPY EVALUATION
"  Outpatient Physical Therapy  INITIAL EVALUATION    [facility]  [address]  [place] [address]  Phone:  [phone]  Fax:  [phone]    Date of Evaluation: 2021    Patient: Christiano Infante  YOB: 1940  MRN: [id number]     Referring Provider: Roxy Ramon M.D.  [address]  [address]  [place] [address]   Referring Diagnosis Imbalance [R26.89]     Time Calculation  Start time: 1430  Stop time: 1510 Time Calculation (min): 40 minutes         Chief Complaint: Difficulty Walking    Visit Diagnoses     ICD-10-CM   1. Imbalance  R26.89       Date of onset of impairment: 2017    Subjective:   History of Present Illness:     Mechanism of injury:  The patient reports that his \"wife thinks he is stumbling\", and reports a history of falls but it has been about 6 months since his last falls.     There are periods of time that he can get dizzy, that he stumbles (but dosen't fall lately), if he moves suddenly can get discombobulated.   His wife thinks he is stumbling and \"not walking correctly\".   He reports he is not longer the \"erect [place] he once was\"     Patient is a 80 year old male with a PMH including: Bipolar disorder, anemia, DVT (6 month txt per pt), chronic venous insufficiency, SNHL B ears, vision changes, tremor, depression, hernia repair, B RCR. Pt endorses chronic hx of leg cramps.      Had PT with Amauri Barrera, PT and Matthias Regalado PT prior for separate diagnoses.   Quality of life:  Good  Headaches:  no headaches  Ear problems: none  Sleep disturbance:  Not disrupted  Pain:     Current pain ratin    At best pain ratin    At worst pain ratin  Social Support:     Lives with:  Spouse  Treatments:     Previous treatment:  Physical therapy      Past Medical History:   Diagnosis Date   • Acute deep vein thrombosis (DVT) of left lower extremity (HCC) 2021   • Bipolar 1 disorder (HCC)    • " Depression      Past Surgical History:   Procedure Laterality Date   • DENTAL EXTRACTION(S)     • HERNIA REPAIR     • SHOULDER SURGERY      bilateral rotator cuff repair     Social History     Tobacco Use   • Smoking status: Never Smoker   • Smokeless tobacco: Never Used   Substance Use Topics   • Alcohol use: Yes     Alcohol/week: 0.6 oz     Types: 1 Glasses of wine per week     Comment: at night     Family and Occupational History     Socioeconomic History   • Marital status:      Spouse name: Not on file   • Number of children: Not on file   • Years of education: Not on file   • Highest education level: Not on file   Occupational History   • Not on file       Objective   Ambulation     Ambulation: Level Surfaces   Ambulation without assistive device: independent    Observational Gait   Gait: within functional limits   Walking speed and stride length within functional limits. Stride width: narrow.      Quality of Movement During Gait   Trunk  Forward lean.     Additional Quality of Movement During Gait Details  Narrow heels but B feet turn out in to ER; occasional toe clearance issues, occasional heel clipping    Comments   6 min walk test: 1152 feet        Therapeutic Exercises (CPT 76536):     2. Heel raises at counter, x20    3. Clamshells, x10 each side    4. Review of gait mechanics    5. HEP review      Therapeutic Exercise Summary: HEP: Patient provided handout (created in [employer]), to be uploaded, exercises include:   Heel raises, clamshells      Time-based treatments/modalities:    Physical Therapy Timed Treatment Charges  Gait training minutes (CPT 49070): 10 minutes      Assessment, Response and Plan:   Impairments: abnormal gait and lacks appropriate home exercise program    Assessment details:  The patient is a 80 year old male who is well known to this office, he returns with complaints of unsteadiness of gait and stumbling. He reports these issues have been going on for some time but his wife  feels his stumbling has been getting worse. Evaluation is remarkable for some mild gait abnormalities, WFL gait speed, mild decreased strength in B LE, and WFL DGA. He would benefit from PT to work on gait mechanics to bring awareness to deficits and prevent further trips/possible falls.   Barriers to therapy:  None  Goals:   Short Term Goals:   1. The patient will demonstrate HEP independently  2. The patient will be able to negotiate a parking lot into a store without trippnig  Short term goal time span:  2-4 weeks      Long Term Goals:    1. The patient will be able to perform 6 minute walk test while maintaining WFL gait mechanics throughout  2. The patient will be able to go at least 1 week without reporting a stumble/trip.   Long term goal time span:  4-6 weeks    Plan:   Therapy options:  Physical therapy treatment to continue  Planned therapy interventions:  Gait Training (CPT 58122), Neuromuscular Re-education (CPT 17286), Therapeutic Exercise (CPT 13403) and E Stim Unattended (CPT 11775)  Frequency:  2x week  Duration in weeks:  6  Duration in visits:  12  Discussed with:  Patient      Functional Assessment Used  Dynamic Gait Index Total Score: 91.67     Referring provider co-signature:  I have reviewed this plan of care and my co-signature certifies the need for services.    Certification Period: 08/02/2021 to  09/13/21    Physician Signature: ________________________________ Date: ______________

## 2021-08-05 ENCOUNTER — PHYSICAL THERAPY (OUTPATIENT)
Dept: PHYSICAL THERAPY | Facility: MEDICAL CENTER | Age: 81
End: 2021-08-05
Attending: FAMILY MEDICINE
Payer: MEDICARE

## 2021-08-05 DIAGNOSIS — R26.9 GAIT ABNORMALITY: ICD-10-CM

## 2021-08-05 DIAGNOSIS — R26.89 IMBALANCE: ICD-10-CM

## 2021-08-05 PROCEDURE — 97112 NEUROMUSCULAR REEDUCATION: CPT

## 2021-08-05 PROCEDURE — 97110 THERAPEUTIC EXERCISES: CPT

## 2021-08-05 PROCEDURE — 97116 GAIT TRAINING THERAPY: CPT

## 2021-08-05 PROCEDURE — 97140 MANUAL THERAPY 1/> REGIONS: CPT

## 2021-08-05 NOTE — OP THERAPY DAILY TREATMENT
"  Outpatient Physical Therapy  DAILY TREATMENT     [facility] Outpatient Physical Therapy  [address]  [place] [address]  Phone:  [phone]  Fax:  [phone]    Date: 08/05/2021    Patient: Christiano Infante  YOB: 1940  MRN: [id number]     Time Calculation    Start time: 1432  Stop time: 1516 Time Calculation (min): 44 minutes         Chief Complaint: Loss Of Balance and Difficulty Walking    Visit #: 2    SUBJECTIVE:  I feel off balanced and it's a lot. When I'm walking I feel a little off.   I'm still working on the exercises. I have dizziness and \"off balance\" with turns.    OBJECTIVE:  Current objective measures:       Gait: No trunk rotation; narrow INNA with intermittent heel clicking    Hypomobile c/s    Saccades: Intact  Head thrust test: Intact    Standing on firm EC: mod sway  Standing on foam EC: mod/sig sway    Plantar surface of feet sensate B      Therapeutic Exercises (CPT 44331):     1. Review of hep    2. Review of balance complaints    Therapeutic Treatments and Modalities:     1. Manual Therapy (CPT 44551), see below    2. Gait Training (CPT 17215), Instructions for trunk rotation and arm swing during amb; pt amb 50 ft     3. Neuromuscular Re-education (CPT 31993), see below    Therapeutic Treatment and Modalities Summary: Manual: STM to B c/s paraspinals and suboccipitals with slight traction followed by down glides grade III from mid to lower c/s to R neck     Neuro re-ed:  VOR x 1//increase dizziness to R  Tandem stance with head turns in // bars, 10x ea: mod sway requiring SBA with interm CGA for safety  Pt education: balance systems including vestibular, somatosensory and visual  Pt education: avoiding/caution with unsafe circumstances; I.e. walking on carpet/grass at night/dimly lit room    Hep:   1. Tandem stance in corner with chair for safety  2. Standing with EC in corner with chair for safety  (HO provided)        Time-based " treatments/modalities:           Pain rating (1-10) before treatment:  0  Pain rating (1-10) after treatment:  0      ASSESSMENT:   Response to treatment:   Hypovestibular and somatosensory systems; pt is vision dominant; discussed caution or avoiding circumstances requiring dominance in hypomobile systems as above. Will continue challenging hypo functioning balance systems as tolerated.    PLAN/RECOMMENDATIONS:   Plan for treatment: therapy treatment to continue next visit.  Planned interventions for next visit: continue with current treatment.

## 2021-08-09 ENCOUNTER — PHYSICAL THERAPY (OUTPATIENT)
Dept: PHYSICAL THERAPY | Facility: MEDICAL CENTER | Age: 81
End: 2021-08-09
Attending: FAMILY MEDICINE
Payer: MEDICARE

## 2021-08-09 DIAGNOSIS — R26.89 IMBALANCE: ICD-10-CM

## 2021-08-09 DIAGNOSIS — R26.9 GAIT ABNORMALITY: ICD-10-CM

## 2021-08-09 PROCEDURE — 97112 NEUROMUSCULAR REEDUCATION: CPT

## 2021-08-09 PROCEDURE — 97116 GAIT TRAINING THERAPY: CPT

## 2021-08-09 NOTE — OP THERAPY DAILY TREATMENT
"  Outpatient Physical Therapy  DAILY TREATMENT     [facility] Outpatient Physical Therapy  [address]  [place] [address]  Phone:  [phone]  Fax:  [phone]    Date: 08/09/2021    Patient: Christiano Infante  YOB: 1940  MRN: [id number]     Time Calculation    Start time: 1430  Stop time: 1511 Time Calculation (min): 41 minutes         Chief Complaint: Difficulty Walking and Loss Of Balance    Visit #: 6    SUBJECTIVE:  I feel off balanced and it's a lot. When I'm walking I feel a little off.   I'm still working on the exercises. I have dizziness and \"off balance\" with turns.    OBJECTIVE:  Current objective measures:       Amb with head rotations and head nods; slight deviation from straight line and dizziness with head turns up and down      Therapeutic Exercises (CPT 51701):     1. Review of hep    2. Review of balance complaints    3. Nustepper, x5 min, cardiovascular warm up    Therapeutic Treatments and Modalities:     2. Gait Training (CPT 45164), Instructions for trunk rotation and arm swing during amb; pt amb 50 ft , reviewed x 10 min; pt demonstrating good carryover with increased trunk rotation and arm swing. Pt amb 80 ft. Walking with poles due to pt asking about nordic walking; Amb 80 ft with poles, 2 pt gait pattern.     3. Neuromuscular Re-education (CPT 94828), see below    Therapeutic Treatment and Modalities Summary: Neuro re-ed:  VOR x 1//increase dizziness to R (discussed discontinuing once reaching 3-4/10 level dizziness with rest break for s/s to resolved before repeating); added to hep with HO provided    Pt education: balance systems including vestibular, somatosensory and visual (reviewed; pt able to recall 2/3 today in session)    Pt education: avoiding/caution with unsafe circumstances; I.e. walking on carpet/grass at night/dimly lit room, turns with quick head turns (reviewed)    Hep: reviewed in session  1. Tandem stance in corner with chair " for safety  2. Standing with EC in corner with chair for safety  (HO provided)    Standing on foam with EC closed; 3x10 sec SBA/intermit CGA; mod to sig increase in sway      Time-based treatments/modalities:    Physical Therapy Timed Treatment Charges  Gait training minutes (CPT 43607): 10 minutes  Neuromusc re-ed, balance, coor, post minutes (CPT 11079): 26 minutes  Therapeutic exercise minutes (CPT 77871): 5 minutes      Pain rating (1-10) before treatment:  0  Pain rating (1-10) after treatment:  0      ASSESSMENT:   Response to treatment:   Hypovestibular and somatosensory systems; pt is vision dominant;  Reviewed caution or avoiding circumstances requiring dominance in hypomobile systems as above. Does demonstrate increased difficulties in tandem with impaired hip strategies. Pt reporting increase in dizziness with head nods vs. Head turns with ambulation; somewhat in conflict to complaints with dizziness with head turns seated and while driving per report. Updated hep with VOR training to assess if pos effect on dizziness complaints; if continued vestibular complaints arise or no improvement in 1-2 weeks; will review with vestibular specialist.       PLAN/RECOMMENDATIONS:   Plan for treatment: therapy treatment to continue next visit.  Planned interventions for next visit: continue with current treatment.  Assess response to VOR hep; Ankle and hip strategies

## 2021-08-10 ENCOUNTER — OFFICE VISIT (OUTPATIENT)
Dept: PHYSICAL MEDICINE AND REHAB | Facility: MEDICAL CENTER | Age: 81
End: 2021-08-10
Payer: MEDICARE

## 2021-08-10 VITALS
DIASTOLIC BLOOD PRESSURE: 64 MMHG | WEIGHT: 185.41 LBS | OXYGEN SATURATION: 97 % | HEIGHT: 70 IN | HEART RATE: 61 BPM | BODY MASS INDEX: 26.54 KG/M2 | SYSTOLIC BLOOD PRESSURE: 126 MMHG | TEMPERATURE: 97.6 F

## 2021-08-10 DIAGNOSIS — M48.061 SPINAL STENOSIS OF LUMBAR REGION, UNSPECIFIED WHETHER NEUROGENIC CLAUDICATION PRESENT: ICD-10-CM

## 2021-08-10 DIAGNOSIS — M25.512 LEFT SHOULDER PAIN, UNSPECIFIED CHRONICITY: ICD-10-CM

## 2021-08-10 DIAGNOSIS — Z98.890 HISTORY OF REPAIR OF LEFT ROTATOR CUFF: ICD-10-CM

## 2021-08-10 PROCEDURE — 99213 OFFICE O/P EST LOW 20 MIN: CPT | Performed by: PHYSICAL MEDICINE & REHABILITATION

## 2021-08-10 ASSESSMENT — PATIENT HEALTH QUESTIONNAIRE - PHQ9
SUM OF ALL RESPONSES TO PHQ QUESTIONS 1-9: 6
CLINICAL INTERPRETATION OF PHQ2 SCORE: 2
5. POOR APPETITE OR OVEREATING: 0 - NOT AT ALL

## 2021-08-10 ASSESSMENT — PAIN SCALES - GENERAL: PAINLEVEL: 3=SLIGHT PAIN

## 2021-08-10 ASSESSMENT — FIBROSIS 4 INDEX: FIB4 SCORE: 1.25

## 2021-08-10 NOTE — PROGRESS NOTES
"Follow-up patient note    Physiatry (physical medicine and  Rehabilitation), interventional spine and sports medicine    Date of Service: 8/10/2021    Chief complaint:   Chief Complaint   Patient presents with   • Follow-Up     Left shoulder pain       HISTORY    HPI: Laurent Infante 81 y.o. male who presents today for evaluation of shoulder pain.    He reports that about 18 months ago, he had an shoulder injection.  History of shoulder pain with history of rotator cuff repair.  Using his left shoulder and arm are painful.    Since his last visit, he has stopped Eliquis.  He was offered a low dose of Eliquis for a year, ASA or \"nothing\".  In about 3 weeks, he takes a D-dimer test.    Back pain is better.  Feels that this is resolved.  Let leg pain has resolved.  He is continuing PT to work on his balance.      Medical records review:  I reviewed the note from the referring provider Roxy Ramon M.D.     Previous treatments:    Physical Therapy: Yes    Medications the patient is tried: Tylenol 1000mg po bid    Previous interventions: none    Previous surgeries to relieve the above pain:  none      ROS:   Gen: sensitive to cold  Eyes: dizziness  ENT: tinnitus, hearing aids  : urgency, sees [facility] [place]  Psych: depression  Red Flags ROS:   Fever, Chills, Sweats: Denies  Involuntary Weight Loss: Denies  Bladder Incontinence: Denies  Bowel Incontinence: Denies  Saddle Anesthesia: Denies    All other systems reviewed and negative.       PMHx:   Past Medical History:   Diagnosis Date   • Acute deep vein thrombosis (DVT) of left lower extremity (HCC) 01/2021   • Bipolar 1 disorder (HCC)    • Depression        PSHx:   Past Surgical History:   Procedure Laterality Date   • DENTAL EXTRACTION(S)     • HERNIA REPAIR     • SHOULDER SURGERY      bilateral rotator cuff repair       Family history   Family History   Problem Relation Age of Onset   • Cancer Mother    • Stroke Father    • Psychiatric Illness Sister  "         Medications:   Current Outpatient Medications   Medication   • aspirin 81 MG EC tablet   • divalproex ER (DEPAKOTE ER) 500 MG TABLET SR 24 HR   • divalproex ER (DEPAKOTE ER) 500 MG TABLET SR 24 HR   • tolterodine ER (DETROL LA) 4 MG CAPSULE SR 24 HR   • Acetaminophen (TYLENOL) 325 MG Cap   • mupirocin (BACTROBAN) 2 % Ointment     No current facility-administered medications for this visit.       Allergies:   No Known Allergies    Social Hx:   Social History     Socioeconomic History   • Marital status:      Spouse name: Not on file   • Number of children: Not on file   • Years of education: Not on file   • Highest education level: Not on file   Occupational History   • Not on file   Tobacco Use   • Smoking status: Never Smoker   • Smokeless tobacco: Never Used   Vaping Use   • Vaping Use: Never used   Substance and Sexual Activity   • Alcohol use: Yes     Alcohol/week: 0.6 oz     Types: 1 Glasses of wine per week     Comment: at night   • Drug use: Never   • Sexual activity: Not on file   Other Topics Concern   •  Service Yes   • Blood Transfusions No   • Caffeine Concern No   • Occupational Exposure No   • Hobby Hazards No   • Sleep Concern No   • Stress Concern Yes   • Weight Concern No   • Special Diet Yes   • Back Care No   • Exercise Yes   • Bike Helmet No   • Seat Belt Yes   • Self-Exams No   Social History Narrative   • Not on file     Social Determinants of Health     Financial Resource Strain:    • Difficulty of Paying Living Expenses:    Food Insecurity:    • Worried About Running Out of Food in the Last Year:    • Ran Out of Food in the Last Year:    Transportation Needs:    • Lack of Transportation (Medical):    • Lack of Transportation (Non-Medical):    Physical Activity:    • Days of Exercise per Week:    • Minutes of Exercise per Session:    Stress:    • Feeling of Stress :    Social Connections:    • Frequency of Communication with Friends and Family:    • Frequency of Social  "Gatherings with Friends and Family:    • Attends Yazidism Services:    • Active Member of Clubs or Organizations:    • Attends Club or Organization Meetings:    • Marital Status:    Intimate Partner Violence:    • Fear of Current or Ex-Partner:    • Emotionally Abused:    • Physically Abused:    • Sexually Abused:          EXAMINATION     Physical Exam:   Vitals: /64 (BP Location: Right arm, Patient Position: Sitting, BP Cuff Size: Small adult)   Pulse 61   Temp 36.4 °C (97.6 °F) (Temporal)   Ht 1.778 m (5' 10\")   Wt 84.1 kg (185 lb 6.5 oz)   SpO2 97%     Constitutional:   Body Habitus: Body mass index is 26.6 kg/m².  Cooperation: Fully cooperates with exam  Appearance: Well-groomed, well-nourished, not disheveled, in no acute distress    Eyes: No scleral icterus, no proptosis     ENT -no obvious auditory deficits, wearing a face mask    Skin -no rashes or lesions noted     Respiratory-  breathing comfortable on room air, no audible wheezing    Cardiovascular- capillary refills less than 2 seconds. No lower extremity edema is noted.     Psychiatric- alert and oriented ×3. Normal affect.     Gait - normal gait, no use of ambulatory device, nonantalgic.    Musculoskeletal -   Cervical spine   Inspection: No deformities of the skin over the cervical spine. No rashes or lesions.    decreased A/P ROM in all directions, without pain      Mild atrophy over the right supraspinatus greater than left, otherwise signs of muscular atrophy in bilateral upper extremities     Negative empty can test bilaterally.  Mildly positive Hawkin's on the left    Thoracic/Lumbar Spine/Sacral Spine/Hips   Inspection: No evidence of atrophy in bilateral lower extremities throughout     ROM: full  AROM with flexion, extension, lateral flexion, and rotation bilaterally, without pain       Lumbar spine Special tests  Neuro tension  Seated straight leg test negative bilaterally      Neuro     Key points for the international standards " for neurological classification of spinal cord injury (ISNCSCI) to light touch.     Dermatome R L   C4 2 2   C5 2 2   C6 2 2   C7 2 2   C8 2 2   T1 2 2   T2 2 2   L2 2 2   L3 2 2   L4 2 2   L5 2 2   S1 2 2   S2 2 2       Motor Exam Upper Extremities   ? Myotome R L   Shoulder flexion C5 5 4*   Elbow flexion C5 5 5   Wrist extension C6 5 5   Elbow extension C7 5 5   Finger flexion C8 5 5   Finger abduction T1 5 5   *possible pain limitation vs. weakness    Motor Exam Lower Extremities    ? Myotome R L   Hip flexion L2 5 5   Knee extension L3 5 5   Ankle dorsiflexion L4 5 4   Toe extension L5 5 4   Ankle plantarflexion S1 5 5       Johnson’s sign negative bilaterally   Babinski sign negative bilaterally   Clonus of the ankle negative bilaterally     Reflexes  ?  R L   Biceps  2+ 2+   Brachioradialis  2+ 2+   Patella  2+ 2+   Achilles   2+ 1+       MEDICAL DECISION MAKING    Medical records review: see under HPI section.     DATA    Labs:   Lab Results   Component Value Date/Time    SODIUM 140 06/12/2021 09:26 AM    POTASSIUM 5.4 06/12/2021 09:26 AM    CHLORIDE 107 06/12/2021 09:26 AM    CO2 26 06/12/2021 09:26 AM    ANION 7.0 06/12/2021 09:26 AM    GLUCOSE 117 (H) 06/12/2021 09:26 AM    BUN 13 06/12/2021 09:26 AM    CREATININE 0.89 06/12/2021 09:26 AM    CALCIUM 9.3 06/12/2021 09:26 AM    ASTSGOT 17 06/12/2021 09:26 AM    ALTSGPT 15 06/12/2021 09:26 AM    TBILIRUBIN 0.6 06/12/2021 09:26 AM    ALBUMIN 4.1 06/12/2021 09:26 AM    TOTPROTEIN 6.2 06/12/2021 09:26 AM    GLOBULIN 2.1 06/12/2021 09:26 AM    AGRATIO 2.0 06/12/2021 09:26 AM       No results found for: PROTHROMBTM, INR     Lab Results   Component Value Date/Time    WBC 7.5 05/10/2021 01:16 PM    RBC 4.78 05/10/2021 01:16 PM    HEMOGLOBIN 14.7 05/10/2021 01:16 PM    HEMATOCRIT 43.6 05/10/2021 01:16 PM    MCV 91.2 05/10/2021 01:16 PM    MCH 30.8 05/10/2021 01:16 PM    MCHC 33.7 05/10/2021 01:16 PM    MPV 9.9 05/10/2021 01:16 PM    NEUTSPOLYS 54.00 05/10/2021 01:16  PM    LYMPHOCYTES 32.00 05/10/2021 01:16 PM    MONOCYTES 12.20 05/10/2021 01:16 PM    EOSINOPHILS 1.10 05/10/2021 01:16 PM    BASOPHILS 0.40 05/10/2021 01:16 PM        No results found for: HBA1C     Imaging: I personally reviewed following images, these are my reads  MRI lumbar spine 05/14/2021  At L1-2, disc bulge without significant central or foraminal stenosis  At L2-3, disc bulge, facet arthropathy left greater than right foraminal stenosis mild.  At L3-4, disc bulge, facet arthropathy with left lateral disc protrusion.  Moderate left and mild right foraminal stenosis  At L4-5, moderate foraminal stenosis bilaterally, moderate lumbar spinal stenosis  At L5-S1, annular disc bulge and left lateral disc protrusion with moderate right foraminal stenosis      IMAGING radiology reads. I reviewed the following radiology reads         Results for orders placed during the hospital encounter of 05/14/21    MR-LUMBAR SPINE-W/O    Impression  1.  L4-5 mild-moderate spinal stenosis    2.  Multilevel foraminal stenoses describing the findings section    3.  Minimal edematous endplate change adjacent to L5-S1    4.   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 History of repair of left rotator cuff  Z98.890   2. Left shoulder pain, unspecified chronicity  M25.512   3. Spinal stenosis of lumbar region, unspecified whether neurogenic claudication present  M48.061           ASSESSMENT:  Laurent Infante 81 y.o. male seen for above     Laurent was seen today for follow-up.    Diagnoses and all orders for this visit:    History of repair of left rotator cuff  -     DX-SHOULDER 2+ LEFT; Future  -     REFERRAL TO PHYSICAL THERAPY    Left shoulder pain, unspecified chronicity  -     DX-SHOULDER 2+ LEFT; Future  -     REFERRAL TO PHYSICAL THERAPY    Spinal stenosis of lumbar region, unspecified whether neurogenic  claudication present       1. Discussed that he has done well with physical therapy.  He is continuing work on balance.  2. Xray of the left shoulder placed.  Also, referral for physical therapy.  3. Discussed possible left shoulder injection, which was helpful for him in the past.  He is now off of his eliquis.  Dr. Barrett has planned d-dimer to check around 08/23.  I have asked that he CC me on that result and we can consider left shoulder injection.  I would request that he hold his aspirin for 6 days prior to injection and resume after 24 hours.  Will not ask to hold this prior to above plan with Dr. Barrett.  The risks benefits and alternatives to this procedure were discussed and the patient wishes to proceed with the procedure. Risks include but are not limited to damage to surrounding structures, infection, bleeding, worsening of pain which can be permanent, weakness which can be permanent. Benefits include pain relief, improved function. Alternatives includes not doing the procedure.  He is agreeable with this plan.      Follow-up: Return if symptoms worsen or fail to improve, for Office injection.    My total time spent caring for the patient on the day of the encounter was 20-29 minutes.       Thank you very much for asking me to participate in Laurent Infante's care.  Please contact me with any questions or concerns.    Please note that this dictation was created using voice recognition software. I have made every reasonable attempt to correct obvious errors but there may be errors of grammar and content that I may have overlooked prior to finalization of this note.      Braxton Lazo MD  Physical Medicine and Rehabilitation  Interventional Spine and Sports Physiatry  [facility]           Roxy Diaz M.D.

## 2021-08-11 ENCOUNTER — PHYSICAL THERAPY (OUTPATIENT)
Dept: PHYSICAL THERAPY | Facility: MEDICAL CENTER | Age: 81
End: 2021-08-11
Attending: FAMILY MEDICINE
Payer: MEDICARE

## 2021-08-11 DIAGNOSIS — R26.89 IMBALANCE: ICD-10-CM

## 2021-08-11 DIAGNOSIS — R26.9 GAIT ABNORMALITY: ICD-10-CM

## 2021-08-11 PROCEDURE — 97112 NEUROMUSCULAR REEDUCATION: CPT

## 2021-08-11 NOTE — OP THERAPY DAILY TREATMENT
"  Outpatient Physical Therapy  DAILY TREATMENT     [facility] Outpatient Physical Therapy  [address]  [place] [address]  Phone:  [phone]  Fax:  [phone]    Date: 08/11/2021    Patient: Christiano Infatne  YOB: 1940  MRN: [id number]     Time Calculation    Start time: 1432  Stop time: 1515 Time Calculation (min): 43 minutes         Chief Complaint: No chief complaint on file.    Visit #: 7    SUBJECTIVE:  I feel off balanced and it's a lot. When I'm walking I feel a little off.   I'm still working on the exercises. I have dizziness and \"off balance\" with turns.    OBJECTIVE:  Current objective measures:           Therapeutic Exercises (CPT 64378):     1. Review of hep    2. Review of balance complaints    3. Nustepper, x5 min, cardiovascular warm up    4. Pt education, re: discussed conserving visits due to medicare benefits     Therapeutic Treatments and Modalities:     3. Neuromuscular Re-education (CPT 67534), see below    Therapeutic Treatment and Modalities Summary: Neuro re-ed:  Review of hep    Balance activities:  1. WS sagittal and frontal planes, 10x ea; gait belt with SBA/CGA, performed next to plinth with chair in front for additional safety   2. Modified tandem stance with head turns, 10 x ea; gait belt with SBA/CGA, performed next to plinth with chair in front for additional safety; increased difficulty LLE post   3. Modified tandem stance with LE on yoga block, 10 sec x 3 ea; SBA/CGA, performed in // bars for safety; increased difficulty LLE post  4. Stand on 1/2 FR in // bars x5 for endurance, CGA for safety, intermittent UE hold; difficulty maintaining balance  5. Wobble board sagittal and frontal directions 15x ea; performed with SBA x 1 and SBA/CGA x 1 progressing towards SBA/CGA x 1 only; intermittent UE hold with decreasing frequency and increased excursion with increased reps          Time-based treatments/modalities:           Pain rating " "(1-10) before treatment:  \"only my shoulder\"  Pain rating (1-10) after treatment:  0      ASSESSMENT:   Response to treatment:   Hypovestibular and somatosensory systems; pt is vision dominant.  Pt demonstrating descent ankle strategy fwd, some impairment with posterior direction. Hip strategies impaired with difficulty most observable on foam pad, 1/2 FR, and narrow INNA. Of note, pt discussing shoulder pain and request to treat; referral has been placed by Dr. Lazo. Did discuss treating one body part at a time per ins; additionally, discussed Medicare cap with pt and conserving sessions as pt has already been seen this year for other concern. Continue at 1x/wk with emphasis on indep hep; pt in agreement.      PLAN/RECOMMENDATIONS:   Plan for treatment: therapy treatment to continue next visit.  Planned interventions for next visit: continue with current treatment.  Continue with hip strategies emphasis and strengthening other sensory systems     "

## 2021-08-12 ENCOUNTER — HOSPITAL ENCOUNTER (OUTPATIENT)
Dept: RADIOLOGY | Facility: MEDICAL CENTER | Age: 81
End: 2021-08-12
Attending: PHYSICAL MEDICINE & REHABILITATION
Payer: MEDICARE

## 2021-08-12 DIAGNOSIS — M25.512 LEFT SHOULDER PAIN, UNSPECIFIED CHRONICITY: ICD-10-CM

## 2021-08-12 DIAGNOSIS — Z98.890 HISTORY OF REPAIR OF LEFT ROTATOR CUFF: ICD-10-CM

## 2021-08-12 PROCEDURE — 73030 X-RAY EXAM OF SHOULDER: CPT | Mod: LT

## 2021-08-17 ENCOUNTER — APPOINTMENT (OUTPATIENT)
Dept: PHYSICAL THERAPY | Facility: MEDICAL CENTER | Age: 81
End: 2021-08-17
Attending: FAMILY MEDICINE
Payer: MEDICARE

## 2021-08-20 ENCOUNTER — APPOINTMENT (OUTPATIENT)
Dept: PHYSICAL THERAPY | Facility: MEDICAL CENTER | Age: 81
End: 2021-08-20
Attending: FAMILY MEDICINE
Payer: MEDICARE

## 2021-08-23 ENCOUNTER — HOSPITAL ENCOUNTER (OUTPATIENT)
Dept: LAB | Facility: MEDICAL CENTER | Age: 81
End: 2021-08-23
Attending: FAMILY MEDICINE
Payer: MEDICARE

## 2021-08-23 DIAGNOSIS — I82.412 ACUTE DEEP VEIN THROMBOSIS (DVT) OF FEMORAL VEIN OF LEFT LOWER EXTREMITY (HCC): ICD-10-CM

## 2021-08-23 LAB — D DIMER PPP IA.FEU-MCNC: 0.37 UG/ML (FEU) (ref 0–0.5)

## 2021-08-23 PROCEDURE — 85379 FIBRIN DEGRADATION QUANT: CPT

## 2021-08-23 PROCEDURE — 36415 COLL VENOUS BLD VENIPUNCTURE: CPT

## 2021-08-25 ENCOUNTER — PHYSICAL THERAPY (OUTPATIENT)
Dept: PHYSICAL THERAPY | Facility: MEDICAL CENTER | Age: 81
End: 2021-08-25
Attending: FAMILY MEDICINE
Payer: MEDICARE

## 2021-08-25 DIAGNOSIS — R26.89 IMBALANCE: ICD-10-CM

## 2021-08-25 DIAGNOSIS — M54.32 LEFT SCIATIC NERVE PAIN: ICD-10-CM

## 2021-08-25 DIAGNOSIS — R26.9 GAIT ABNORMALITY: ICD-10-CM

## 2021-08-25 PROCEDURE — 97112 NEUROMUSCULAR REEDUCATION: CPT

## 2021-08-25 NOTE — OP THERAPY DAILY TREATMENT
Outpatient Physical Therapy  DAILY TREATMENT     [facility] Outpatient Physical Therapy  [address]  [place] [address]  Phone:  [phone]  Fax:  [phone]    Date: 08/25/2021    Patient: Christiano Infante  YOB: 1940  MRN: [id number]     Time Calculation    Start time: 1302  Stop time: 1348 Time Calculation (min): 46 minutes         Chief Complaint: Difficulty Walking and Loss Of Balance    Visit #: 5    SUBJECTIVE:  I'm doing okay. I feel like my balance is improving a bit. The most helpful has been the eye exercises.     OBJECTIVE:  Current objective measures:       See DC note for details    Therapeutic Exercises (CPT 88762):     1. Review of hep    2. Review of balance complaints-goals assessment    3. Nustepper, x5 min, cardiovascular warm up    4. Pt education, Re: discussed continuing with hep for balance and reconvening after addressing shoulder    5. 6 min walk test    Therapeutic Treatments and Modalities:     3. Neuromuscular Re-education (CPT 56632), see below, x41 min    Therapeutic Treatment and Modalities Summary: Neuro re-ed x3-4 min ea with rest breaks    VOR x 1//increase dizziness to R (discussed discontinuing once reaching 3-4/10 level dizziness with rest break for s/s to resolved before repeating); reviewed    VOR x2//increased dizziness in shorter period of time vs. VOR x1     Saccade+head movement inf/sup, lateral and diagonal directions x10 //pt reporting dizziness with both eye movements with and without head movements; added to hep in sitting  HO provided of new hep-see media    Balance activities:  1. WS sagittal and frontal planes, 10x ea; gait belt with SBA/CGA, performed next to plinth with   4. Stand on 1/2 FR in // bars x5 for endurance, CGA for safety, intermittent UE hold; difficulty maintaining balance but improved compared to last session          Time-based treatments/modalities:    Physical Therapy Timed Treatment  "Charges  Neuromusc re-ed, balance, coor, post minutes (CPT 65799): 41 minutes  Therapeutic exercise minutes (CPT 61714): 5 minutes      Pain rating (1-10) before treatment:  \"only my shoulder\"  Pain rating (1-10) after treatment:  0      ASSESSMENT:   Response to treatment:   DC      PLAN/RECOMMENDATIONS:   Plan for treatment: DC; see note     "

## 2021-08-26 NOTE — OP THERAPY DISCHARGE SUMMARY
Outpatient Physical Therapy  DISCHARGE SUMMARY NOTE      [facility] Outpatient Physical Therapy  [address]  [place] [address]  Phone:  [phone]  Fax:  [phone]    Date of Visit: 08/25/2021    Patient: Christiano Infante  YOB: 1940  MRN: [id number]     Referring Provider: Roxy Ramon M.D.  [address] [facility]  [address]  [place] [address]   Referring Diagnosis Other abnormalities of gait and mobility [R26.89]         Functional Assessment Used        Your patient is being discharged from Physical Therapy with the following comments:   · Goals partially met  · Pt requesting to address shoulder as main complaint     Comments:   Pt seen for 5 visits to address balance with pt reporting subjective improvements in 'stability.' Continues to have dizziness with head turns while walking, quick turns, rapid eye movements. Pt believing eye exercises are beneficial; provided with VOR x1, x 2 and saccade with head movements. Hypovestibular and somatosensory systems; pt is vision dominant. Pt demonstrating descent ankle strategy fwd, some impairment with posterior direction. Hip strategies impaired.     Pt would likely continue to benefit from further balance interventions but would like to DC in order to address shoulder as primary complaint. Discussed reconvening with balance in future if needed with pt verbalizing understanding.    Limitations Remaining/Objective:  6 min walk test: 1343 ft  Walking speed and stride length within functional limits.     Goals:   Short Term Goals:   1. The patient will demonstrate HEP independently (met)  2. The patient will be able to negotiate a parking lot into a store without tripping (met)  Short term goal time span:  2-4 weeks      Long Term Goals:    1. The patient will be able to perform 6 minute walk test while maintaining WFL gait mechanics throughout (partially met)  2. The patient will be able to go at least 1 week without  reporting a stumble/trip. (met)  Long term goal time span:  4-6 weeks    Recommendations:  Pt has partially satisfied above listed goals and reports is ready to DC today with independent hep in order to focus on shoulder. All questions answered and verbally reviewed independent hep with pt.       Matthias Regalado, PT    Date: 8/25/2021

## 2021-08-30 ENCOUNTER — PHYSICAL THERAPY (OUTPATIENT)
Dept: PHYSICAL THERAPY | Facility: MEDICAL CENTER | Age: 81
End: 2021-08-30
Attending: PHYSICAL MEDICINE & REHABILITATION
Payer: MEDICARE

## 2021-08-30 DIAGNOSIS — Z98.890 HISTORY OF REPAIR OF LEFT ROTATOR CUFF: ICD-10-CM

## 2021-08-30 DIAGNOSIS — M25.512 LEFT SHOULDER PAIN, UNSPECIFIED CHRONICITY: ICD-10-CM

## 2021-08-30 PROCEDURE — 97110 THERAPEUTIC EXERCISES: CPT

## 2021-08-30 PROCEDURE — 97162 PT EVAL MOD COMPLEX 30 MIN: CPT

## 2021-08-30 ASSESSMENT — ENCOUNTER SYMPTOMS
PAIN SCALE: 0
PAIN SCALE AT LOWEST: 0
PAIN SCALE AT HIGHEST: 6

## 2021-08-30 NOTE — OP THERAPY EVALUATION
Outpatient Physical Therapy  INITIAL EVALUATION    [facility] Outpatient Physical Therapy  [address]  [place] [address]  Phone:  [phone]  Fax:  [phone]    Date of Evaluation: 08/30/2021    Patient: Christiano Infante  YOB: 1940  MRN: [id number]     Referring Provider: Braxton Lazo M.D.  [address]  [address]  [place] [address]   Referring Diagnosis History of repair of left rotator cuff [Z98.890];Left shoulder pain, unspecified chronicity [M25.512]     Time Calculation  Start time: 1430  Stop time: 1515 Time Calculation (min): 45 minutes         Chief Complaint: Shoulder Problem    Visit Diagnoses     ICD-10-CM   1. History of repair of left rotator cuff  Z98.890   2. Left shoulder pain, unspecified chronicity  M25.512       Date of onset of impairment: No data found    Subjective:   History of Present Illness:     Mechanism of injury:  Patient is a 80 year old male with a PMH including: Bipolar disorder, anemia, DVT (6 month txt per pt), chronic venous insufficiency, SNHL B ears, vision changes, tremor, depression, hernia repair, B RCR. Pt endorses chronic hx of leg cramps.    Pt is known to this author and has been seen for 2 bouts of PT to address LB and LE pain and balance complaints (5/28/21-8/25/21). He presents to therapy today to discuss chronic intermittent L shoulder pain which initiated 18 months ago. Has hx of RCR and injection therapy.    Pt presents to therapy with complaints of intermittent B shoulder pain L>R which initiated years ago with insidious onset.  Endorses hx of B RCR (1 20 years ago and other in 15 years). Min therapy following. Pt reporting pain-free following surgery but pain returned a few years later with insidious onset and again more threw a heavy bag of trash and felt L shoulder pop with pain; was worried about RC re-tear.   Endorses current pain as throbbing or sharp pain with movement; intermittent popping in  "shoulders. Denies numbness/tingling; endorses some difficulty gripping with R hand, has noticed this over the past 6 months to one year. Overall worsening; Pt reporting increased frequency and intense pain.           Sleep disturbance:  Not disrupted  Pain:     Current pain ratin    At best pain ratin    At worst pain ratin    Location:  Anterior shoulder extending into upper arm    Quality: Throbbing; occasional sharp pains.    Progression:  Worsening    Pain Comments::  Aggravating: Sleeping on shoulder, overhead movements, lifting objects    Relieving: Resting, \"leaving it alone\"  Hand dominance:  Left  Diagnostic Tests:     Diagnostic Tests Comments:  L shoulder x ray:  FINDINGS:  There is acromioclavicular joint osteoarthritis.     There is suture anchor within the humeral head.     There is probably mild clavicular joint osteoarthritis.     There are no fracture or malalignment.     The visualized lung is clear.     IMPRESSION:     1.  Acromioclavicular joint osteoarthritis     2.  Probable mild glenohumeral joint osteoarthritis     3.  Suture anchor within the humeral head    Treatments:     Treatment Comments:  PT in distant past following B RCR   Activities of Daily Living:     Patient reported ADL status: Patient's current daily routine includes:  Work: Retired  Hobbies: Gardening     ADL's: Indep with ADL's but with pain (see above)    Patient Goals:     Patient goals for therapy:  Decreased pain      Past Medical History:   Diagnosis Date   • Acute deep vein thrombosis (DVT) of left lower extremity (HCC) 2021   • Bipolar 1 disorder (HCC)    • Depression      Past Surgical History:   Procedure Laterality Date   • DENTAL EXTRACTION(S)     • HERNIA REPAIR     • SHOULDER SURGERY      bilateral rotator cuff repair     Social History     Tobacco Use   • Smoking status: Never Smoker   • Smokeless tobacco: Never Used   Substance Use Topics   • Alcohol use: Yes     Alcohol/week: 0.6 oz     " "Types: 1 Glasses of wine per week     Comment: at night     Family and Occupational History     Socioeconomic History   • Marital status:      Spouse name: Not on file   • Number of children: Not on file   • Years of education: Not on file   • Highest education level: Not on file   Occupational History   • Not on file       Objective     Postural Observations  Seated posture: poor    Additional Postural Observation Details  Forward head and rounded shoulders; kyphotic    Cervical Screen    Cervical range of motion within normal limits with the following exceptions: Decreased but within normal age related changes; no reproduction of shoulder complaints     Neurological Testing     Sensation     Shoulder   Left Shoulder   Intact: light touch    Right Shoulder   Intact: light touch    Comments   Left light touch: Dermatomes WFL B    Active Range of Motion   Left Shoulder   Flexion: 95 (ERP) degrees   Abduction: 90 (ERP) degrees   External rotation 0°: 45 (ERP) degrees     Right Shoulder   Flexion: 103 (ERP) degrees   Abduction: 125 (ERP) degrees   External rotation 0°: 5 (\"it just doesn't go\") degrees     Additional Active Range of Motion Details  Compensates with shoulder hiking B and lateral leaning with shoulder elevation    Passive Range of Motion   Left Shoulder   Flexion: 125 (ERP) degrees   Abduction: 105 (ERP) degrees   External rotation 0°: 56 (ERP) degrees   External rotation 90°: 25 (ERP) degrees     Right Shoulder   Flexion: 145 degrees   Abduction: 143 degrees   External rotation 0°: 60 degrees   External rotation 90°: 65 degrees     Scapular Mobility   Left Shoulder   Scapular mobility: fair    Right Shoulder   Scapular mobility: fair    Joint Play   Left Shoulder     Anterior capsule: hypomobile    Posterior capsule: hypomobile    Inferior capsule: hypomobile  Right Shoulder     Anterior capsule: within functional limits    Posterior capsule: within functional limits    Inferior capsule: within " functional limits    Strength:      Additional Strength Details  GH isometrics: ( set up: 0 deg GH abd and elbow 90 deg)    L GH:   Flexion: Mod  Extension: Mod  Abd: Mod and painful*  Add: Mod  IR: Mod and painful*  ER: Min and painful*    R GH:  Flexion: Mod   Extension: Mod  Abd: Mod and painful*  Add: Strong   IR: Mod  ER: Min and painful*    *=reproduces chief complaint      Tests     Additional Tests Details  Manual scapular assist: Improved elevation B        Therapeutic Exercises (CPT 47369):     1. Pt education, re: Shoulder and scapular mechanics, video reference shown    2. Pt education, re: posture education    20. UPOC: 10/29/21; eval on 8/30/21      Time-based treatments/modalities:    Physical Therapy Timed Treatment Charges  Therapeutic exercise minutes (CPT 23959): 15 minutes      Assessment, Response and Plan:   Impairments: abnormal or restricted ROM, activity intolerance, impaired functional mobility, impaired physical strength, lacks appropriate home exercise program, limited ADL's and pain with function    Assessment details:  Patient is a pleasant and cooperative 81 year old male who is well known to this author. He presents to therapy with chronic hx of B shoulder L>R pain with insidious onset. Does have distant hx of B RCR surgeries with min rehab following.     Exam findings suggestive of periscapular and RC weakness, poor postural awareness including forward head and rounded shoulders, hypomobile joint L only, compensations including UT hiking and lateral bending. Cervical screen negative. Pt may benefit from skilled physical therapy in order to address above impairments in order to improve QOL and return to reported ADL's.     Barriers to therapy:  Comorbidities  Other barriers to therapy:  Chronicity of complaints  Prognosis: fair    Goals:   Short Term Goals:   1. Pt will be independent with written HEP.  2. Pt will be indep with scapular setting.    Short term goal time span:  2-4  weeks      Long Term Goals:    1. Pt will be independent with written HEP.  2. Pt will have a sig improvement in QuickDash score >/= IAN/MCID (eval: 20.45 )  3. Pt will demonstrate correct scapular setting with reaching consistently.  4. Pt will demonstrate 20 deg or greater improvement in shoulder AROM B in order to complete ADL's.  5. Pt will report 50% or greater improvements in sleep hygiene.    Long term goal time span:  6-8 weeks    Plan:   Therapy options:  Physical therapy treatment to continue  Planned therapy interventions:  E Stim Unattended (CPT 76533), Manual Therapy (CPT 56726), Neuromuscular Re-education (CPT 82949), Therapeutic Activities (CPT 53480) and Therapeutic Exercise (CPT 42205)  Frequency: 1-2x/wk.  Duration in weeks:  8  Discussed with:  Patient  Plan details:  UPOC: 10/29/21    *Will progress to 1x/wk when indep with hep      Functional Assessment Used  Quickdash General Total Score: 20.45     Referring provider co-signature:  I have reviewed this plan of care and my co-signature certifies the need for services.    Certification Period: 08/30/2021 to 10/29/21    Physician Signature: ________________________________ Date: ______________

## 2021-09-02 DIAGNOSIS — R05.9 COUGH: ICD-10-CM

## 2021-09-02 RX ORDER — FLUTICASONE PROPIONATE 50 MCG
1 SPRAY, SUSPENSION (ML) NASAL DAILY
Qty: 48 ML | Refills: 0 | Status: SHIPPED | OUTPATIENT
Start: 2021-09-02 | End: 2021-11-29

## 2021-09-09 ENCOUNTER — APPOINTMENT (OUTPATIENT)
Dept: PHYSICAL THERAPY | Facility: MEDICAL CENTER | Age: 81
End: 2021-09-09
Attending: PHYSICAL MEDICINE & REHABILITATION
Payer: MEDICARE

## 2021-09-16 ENCOUNTER — PHYSICAL THERAPY (OUTPATIENT)
Dept: PHYSICAL THERAPY | Facility: MEDICAL CENTER | Age: 81
End: 2021-09-16
Attending: PHYSICAL MEDICINE & REHABILITATION
Payer: MEDICARE

## 2021-09-16 DIAGNOSIS — M25.512 LEFT SHOULDER PAIN, UNSPECIFIED CHRONICITY: ICD-10-CM

## 2021-09-16 DIAGNOSIS — Z98.890 HISTORY OF REPAIR OF LEFT ROTATOR CUFF: ICD-10-CM

## 2021-09-16 DIAGNOSIS — R26.89 IMBALANCE: ICD-10-CM

## 2021-09-16 DIAGNOSIS — M54.32 LEFT SCIATIC NERVE PAIN: ICD-10-CM

## 2021-09-16 DIAGNOSIS — R26.9 GAIT ABNORMALITY: ICD-10-CM

## 2021-09-16 PROCEDURE — 97110 THERAPEUTIC EXERCISES: CPT

## 2021-09-16 PROCEDURE — 97140 MANUAL THERAPY 1/> REGIONS: CPT

## 2021-09-16 NOTE — OP THERAPY DAILY TREATMENT
"  Outpatient Physical Therapy  DAILY TREATMENT     [facility] Outpatient Physical Therapy  [address]  [place] [address]  Phone:  [phone]  Fax:  [phone]    Date: 09/16/2021    Patient: Christiano Infante  YOB: 1940  MRN: [id number]     Time Calculation    Start time: 1432  Stop time: 1520 Time Calculation (min): 48 minutes         Chief Complaint: Shoulder Problem    Visit #: 2    SUBJECTIVE:  My shoulder has been feeling better now that I know my rotator cuff isn't damaged.      OBJECTIVE:  Current objective measures:       Active Range of Motion   Left Shoulder   Flexion: 135 degrees; some pain with eccentric   Abduction: 100 (ERP) degrees   External rotation: 60(ERP) degrees     Right Shoulder   Flexion: 135 (ERP) degrees   Abduction: 110 (ERP) degrees   External rotation 0°: 5 (\"it just doesn't go\") degrees     Joint Play   Left Shoulder     Anterior capsule: hypomobile    Posterior capsule: hypomobile    Inferior capsule: hypomobile    Hypomobile t/s; fixed kyphosis    Therapeutic Exercises (CPT 44275):     1. UBE , x7 min, cardiovascular warm up     2. Pt education, re: posture education    3. [place] , orange TB; x10 with 10 sec hold, min rotation RUE secondary to weakness    4. Scapular retraction, x15, hep    20. UPOC: 10/29/21; eval on 8/30/21    Therapeutic Treatments and Modalities:     1. Manual Therapy (CPT 18193), x14 min    Therapeutic Treatment and Modalities Summary: CPA to CTJ and T1-T7, then L GH joint mobs inf and post gr III with increasing elevation as tolerated    Time-based treatments/modalities:    Physical Therapy Timed Treatment Charges  Manual therapy minutes (CPT 23298): 14 minutes  Therapeutic exercise minutes (CPT 83500): 34 minutes      Pain rating (1-10) before treatment:  0  Pain rating (1-10) after treatment:  0        ASSESSMENT:   Response to treatment:   Difficulty tolerating increasing elevations with GH mobs secondary to " pain complaints. Pt does demonstrate sig improvement in GH AROM into elevation; pt attributing partially due to running while in airport and loosening up shoulder. Demonstrates sig weakness into ER R GH which will benefit from AAROM subsequent sessions.      PLAN/RECOMMENDATIONS:   Plan for treatment: therapy treatment to continue next visit.  Planned interventions for next visit: continue with current treatment.  AAROM for R GH ER for strengthening.

## 2021-09-21 ENCOUNTER — PHYSICAL THERAPY (OUTPATIENT)
Dept: PHYSICAL THERAPY | Facility: MEDICAL CENTER | Age: 81
End: 2021-09-21
Attending: PHYSICAL MEDICINE & REHABILITATION
Payer: MEDICARE

## 2021-09-21 DIAGNOSIS — M25.512 LEFT SHOULDER PAIN, UNSPECIFIED CHRONICITY: ICD-10-CM

## 2021-09-21 DIAGNOSIS — Z98.890 HISTORY OF REPAIR OF LEFT ROTATOR CUFF: ICD-10-CM

## 2021-09-21 DIAGNOSIS — M54.32 LEFT SCIATIC NERVE PAIN: ICD-10-CM

## 2021-09-21 DIAGNOSIS — R26.89 IMBALANCE: ICD-10-CM

## 2021-09-21 DIAGNOSIS — R26.9 GAIT ABNORMALITY: ICD-10-CM

## 2021-09-21 PROCEDURE — 97110 THERAPEUTIC EXERCISES: CPT

## 2021-09-21 NOTE — OP THERAPY DAILY TREATMENT
"  Outpatient Physical Therapy  DAILY TREATMENT     [facility] Outpatient Physical Therapy  [address]  [place] [address]  Phone:  [phone]  Fax:  [phone]    Date: 09/21/2021    Patient: Christiano Infante  YOB: 1940  MRN: [id number]     Time Calculation    Start time: 1520  Stop time: 1558 Time Calculation (min): 38 minutes         Chief Complaint: Shoulder Problem    Visit #: 3    SUBJECTIVE:  I still have trouble when I reach up overhead with both arms. I have been traveling. I think the rubberband exercises have been helpful.       OBJECTIVE:  Current objective measures:       Active Range of Motion   Left Shoulder   Flexion: 135 degrees; some pain with eccentric   Abduction: 100 (ERP) degrees   External rotation: 60(ERP) degrees     Right Shoulder   Flexion: 135 (ERP) degrees   Abduction: 110 (ERP) degrees   External rotation 0°: 5 (\"it just doesn't go\") degrees     Joint Play   Left Shoulder     Anterior capsule: hypomobile    Posterior capsule: hypomobile    Inferior capsule: hypomobile    Hypomobile t/s; fixed kyphosis    Therapeutic Exercises (CPT 95724):     1. [place], x5 min, cardiovascular warm up     2. Pt education, re: posture education    3. [place] BUE>LUE only with RUE holding TB as base, orange TB; x10 , modified due to weakness at RUE    5. Rows, x15 with , reviewed; muscle juddering with maintained hold    6. Scapular setting, x2 min, hep for setting up shoulder before reaching     7. R GH ER AAROM with staff , x10, VC's to stay within pain-free range     8. Scaption, x15    9. R GH ER SL no weight, x10, to neutral only; pain-free; added to hep    10. GH ER isometrics , x10 with 5 sec hold, hep    20. UPOC: 10/29/21; eval on 8/30/21      Time-based treatments/modalities:    Physical Therapy Timed Treatment Charges  Therapeutic exercise minutes (CPT 27942): 38 minutes      Pain rating (1-10) before treatment:  0  Pain rating (1-10) after " treatment:  0        ASSESSMENT:   Response to treatment:   Pt maintaining gains in shoulder AROM with continued complaints with repetitive reaching and pain with return from flexion LGH. Sig weakness into GH ER with some complaints of pain with seated with staff; reporting painless with SL position with ability to attain neutral. Pain with eccentric control LGH from flexion improved with scapular setting. Overall, progressing well with reported positive impact on ADL's.      PLAN/RECOMMENDATIONS:   Plan for treatment: therapy treatment to continue next visit.  Planned interventions for next visit: continue with current treatment.  Review new hep and continue periscapular strengthening as tolerated

## 2021-09-23 ENCOUNTER — PHYSICAL THERAPY (OUTPATIENT)
Dept: PHYSICAL THERAPY | Facility: MEDICAL CENTER | Age: 81
End: 2021-09-23
Attending: PHYSICAL MEDICINE & REHABILITATION
Payer: MEDICARE

## 2021-09-23 DIAGNOSIS — R26.89 IMBALANCE: ICD-10-CM

## 2021-09-23 DIAGNOSIS — M54.32 LEFT SCIATIC NERVE PAIN: ICD-10-CM

## 2021-09-23 DIAGNOSIS — Z98.890 HISTORY OF REPAIR OF LEFT ROTATOR CUFF: ICD-10-CM

## 2021-09-23 DIAGNOSIS — M25.512 LEFT SHOULDER PAIN, UNSPECIFIED CHRONICITY: ICD-10-CM

## 2021-09-23 DIAGNOSIS — R26.9 GAIT ABNORMALITY: ICD-10-CM

## 2021-09-23 PROCEDURE — 97140 MANUAL THERAPY 1/> REGIONS: CPT

## 2021-09-23 PROCEDURE — 97110 THERAPEUTIC EXERCISES: CPT

## 2021-09-23 NOTE — OP THERAPY DAILY TREATMENT
Outpatient Physical Therapy  DAILY TREATMENT     [facility] Outpatient Physical Therapy  [address]  [place] [address]  Phone:  [phone]  Fax:  [phone]    Date: 09/23/2021    Patient: Christiano Infante  YOB: 1940  MRN: [id number]     Time Calculation    Start time: 1516  Stop time: 1555 Time Calculation (min): 39 minutes         Chief Complaint: Shoulder Problem    Visit #: 4    SUBJECTIVE:  I haven't had a lot of issues with my shoulder this week. Sometimes I still have pain such as when I lift a plate.      OBJECTIVE:  Current objective measures:       Active Range of Motion post treatment:  Left Shoulder   Flexion: 140 degrees; some pain with eccentric     Right Shoulder   Flexion: 143 (ERP) degrees     Active GH ROM in supine:  155 deg B     GH PROM in supine:  155 deg B    Poor postural awareness with forward/rounded shoulders    Therapeutic Exercises (CPT 55029):     1. [place], x5 min, cardiovascular warm up     2. Pt education, re: posture education, reviewed    3. Review of hep    6. Scapular setting, x2 min, reviewed; good carryover    8. Scaption, x15, reviewed; good carryover    10. GH ER isometrics , x10 with 5 sec hold, reviewed; light pain with VC's for tolerance only    11. FR activities (open book pec stretch, alt GH flexion, scapular punches, isa wings ), 15x ea, limited ability to maintain GH ER at RUE; pain-free into elevation     20. UPOC: 10/29/21; eval on 8/30/21    Therapeutic Treatments and Modalities:     1. Manual Therapy (CPT 14236), see below, x13 min    Therapeutic Treatment and Modalities Summary: Manual: PA and inf glides gr III at L GHJ with increasing elevation  Gentle manual isometrics at R GH for ER strengthening with pt supine with increasing elevation; pt reporting slight increase in pain with all elevations    Time-based treatments/modalities:    Physical Therapy Timed Treatment Charges  Manual therapy minutes (CPT  37527): 13 minutes  Therapeutic exercise minutes (CPT 04949): 26 minutes      Pain rating (1-10) before treatment:  0  Pain rating (1-10) after treatment:  0        ASSESSMENT:   Response to treatment:   Improved joint mobility L GHJ with good progression in active and passive ROM.  Pt reporting continuing to have pain complaints with reaching and lifting objects such as a plate. Due to sig weakness at R  into ER, suspicion of partial RC tear; discussed continuing rehab due to positive improvements in such a short term with increased ROM and reports of decreasing pain. Additionally educated pt the commonality of RC tears (I.e. 80% over 80 years old, etc). Discussed most important outcome of rehab to complete ADL's and decrease in pain regardless if tearing is present or not. Due to sig improvements in range of motion will now focus on strengthening in new range to maintain.      PLAN/RECOMMENDATIONS:   Plan for treatment: therapy treatment to continue next visit.  Planned interventions for next visit: continue with current treatment.  Continue gentle strengthening as tolerated.

## 2021-09-24 ENCOUNTER — HOSPITAL ENCOUNTER (OUTPATIENT)
Dept: LAB | Facility: MEDICAL CENTER | Age: 81
End: 2021-09-24
Attending: FAMILY MEDICINE
Payer: MEDICARE

## 2021-09-24 ENCOUNTER — TELEPHONE (OUTPATIENT)
Dept: VASCULAR LAB | Facility: MEDICAL CENTER | Age: 81
End: 2021-09-24

## 2021-09-24 DIAGNOSIS — M79.89 LEG SWELLING: ICD-10-CM

## 2021-09-24 LAB — D DIMER PPP IA.FEU-MCNC: 0.33 UG/ML (FEU) (ref 0–0.5)

## 2021-09-24 PROCEDURE — 36415 COLL VENOUS BLD VENIPUNCTURE: CPT

## 2021-09-24 PROCEDURE — 85379 FIBRIN DEGRADATION QUANT: CPT

## 2021-09-24 NOTE — PROGRESS NOTES
Reports new onset swelling  W/o pain.  Pending appt next Friday.  Currently on ASA only.  Check D-dimer today.  If neg, no further w/u.  If positive, will set up stat duplex.

## 2021-09-24 NOTE — TELEPHONE ENCOUNTER
Pt returned call - last d-dimer test was 6 weeks ago. Pt agreed to go get tested again today and let us know the results to determine if there is a need for the repeat ultrasound prior to Friday appt.     Called pt and left VM for him to call back to discuss the below based on the D-dimer test that he took and to clarify if he took that test today or not. Awaiting pt call.     ----- Message from Damian Barrett M.D. sent at 9/24/2021 11:43 AM PDT -----  Regarding: RE: Swelling in Ankles  Have him complete a D-dimer lab today.  If negative, then risk of blood clot is basically negative.  If positive, we can arrange a repeat ultrasound prior to next Friday.  Continue aspirin for now. Thanks   ----- Message -----  From: lAex Edmondson, Med Ass't  Sent: 9/24/2021   9:09 AM PDT  To: Damian Barrett M.D.  Subject: Swelling in Ankles                               Hello!     Pt called, you had previously treated him for a blood clot. Pt recently swapped to Asprin 81mg per his last appt with you. Pt stated he took the d-dimer but he is experiencing swelling in his ankles and is concerned about another clot. I put him on the schedule to see you on Friday ( he refused a spot with an APRN).     Please advise if you would like the pt to do anything else in the meantime or if you think he needs to be seen sooner.     Thank you,   LP

## 2021-09-27 ENCOUNTER — DOCUMENTATION (OUTPATIENT)
Dept: VASCULAR LAB | Facility: MEDICAL CENTER | Age: 81
End: 2021-09-27

## 2021-09-27 NOTE — PROGRESS NOTES
Spoke with patient to let him know, D-dimer is normal level indicating low to no chance of new blood clot.  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  Can lead to intermittent swelling, dull ache in the legs.    Patient states understanding and is in agreement to follow up Friday 10/1/21.

## 2021-09-28 ENCOUNTER — PHYSICAL THERAPY (OUTPATIENT)
Dept: PHYSICAL THERAPY | Facility: MEDICAL CENTER | Age: 81
End: 2021-09-28
Attending: PHYSICAL MEDICINE & REHABILITATION
Payer: MEDICARE

## 2021-09-28 DIAGNOSIS — Z98.890 HISTORY OF REPAIR OF LEFT ROTATOR CUFF: ICD-10-CM

## 2021-09-28 DIAGNOSIS — M25.512 LEFT SHOULDER PAIN, UNSPECIFIED CHRONICITY: ICD-10-CM

## 2021-09-28 PROCEDURE — 97110 THERAPEUTIC EXERCISES: CPT

## 2021-09-28 NOTE — OP THERAPY DAILY TREATMENT
"  Outpatient Physical Therapy  DAILY TREATMENT     [facility] Outpatient Physical Therapy  [address]  [place] [address]  Phone:  [phone]  Fax:  [phone]    Date: 09/28/2021    Patient: Christiano Infante  YOB: 1940  MRN: [id number]     Time Calculation    Start time: 1516  Stop time: 1600 Time Calculation (min): 44 minutes         Chief Complaint: Shoulder Problem    Visit #: 5    SUBJECTIVE:  My shoulder is doing okay. The homework is going well.       OBJECTIVE:  Current objective measures:       Active Range of Motion post treatment (tested seated):  Left Shoulder   Flexion: 147 deg  Abduction: 125 ERP    Right Shoulder   Flexion: 160  Abduction: 147 deg      Poor postural awareness with forward/rounded shoulders    Therapeutic Exercises (CPT 16271):     1. [place], x5 min, cardiovascular warm up     2. Pt education, re: posture education, reviewed    3. Review of hep    8. Scaption with 2# dumbbells, 2x7, fatigue with visible muscle juddering R>L    14. Rows, x15 with 5 sec hold; green TB, reviewed    15. Pull downs, x15 with 5 sec hold; green TB, reviewed     16. W's at the wall, x10, difficulty maintaining R GH ER during elevation     17. SL shoulder abduction, 15x ea, VC's to maintain scapulas in 'set' position; improved with increased reps; painless with B GH    18. SA punches supine, 5#dumbbells, 2x10 ea    19. Counter push ups, 10x, VC's to ensure rounding of shoulder with push out for \"push up with a plus\"    20. UPOC: 10/29/21; eval on 8/30/21      Time-based treatments/modalities:    Physical Therapy Timed Treatment Charges  Therapeutic exercise minutes (CPT 72561): 44 minutes      Pain rating (1-10) before treatment:  0  Pain rating (1-10) after treatment:  0        ASSESSMENT:   Response to treatment:   Pt continuing to demonstrate improvements in B GHJ range of motion. Current emphasis on strengthening new range with focus on periscapular " strengthening. Fatigue with ex as above, some transient soreness which resolved following exercises. Continue with above exercises and progress as tolerated. Continue at 1x/wk.      PLAN/RECOMMENDATIONS:   Plan for treatment: therapy treatment to continue next visit.  Planned interventions for next visit: continue with current treatment. Continue gentle strengthening as tolerated.

## 2021-09-30 ENCOUNTER — PHYSICAL THERAPY (OUTPATIENT)
Dept: PHYSICAL THERAPY | Facility: MEDICAL CENTER | Age: 81
End: 2021-09-30
Attending: PHYSICAL MEDICINE & REHABILITATION
Payer: MEDICARE

## 2021-09-30 DIAGNOSIS — M54.32 LEFT SCIATIC NERVE PAIN: ICD-10-CM

## 2021-09-30 DIAGNOSIS — Z98.890 HISTORY OF REPAIR OF LEFT ROTATOR CUFF: ICD-10-CM

## 2021-09-30 DIAGNOSIS — R26.89 IMBALANCE: ICD-10-CM

## 2021-09-30 DIAGNOSIS — R26.9 GAIT ABNORMALITY: ICD-10-CM

## 2021-09-30 DIAGNOSIS — M25.512 LEFT SHOULDER PAIN, UNSPECIFIED CHRONICITY: ICD-10-CM

## 2021-09-30 PROCEDURE — 97140 MANUAL THERAPY 1/> REGIONS: CPT

## 2021-09-30 PROCEDURE — 97110 THERAPEUTIC EXERCISES: CPT

## 2021-09-30 NOTE — OP THERAPY DAILY TREATMENT
Outpatient Physical Therapy  DAILY TREATMENT     [facility]  [address]  [place] [address]  Phone:  [phone]  Fax:  [phone]    Date: 09/30/2021    Patient: Christiano Infante  YOB: 1940  MRN: [id number]     Time Calculation    Start time: 1516  Stop time: 1557 Time Calculation (min): 41 minutes         Chief Complaint: Shoulder Problem    Visit #: 6    SUBJECTIVE:  I think maybe the lifting is getting a little big easier.       OBJECTIVE:  Current objective measures:       Active Range of Motion post treatment (tested seated):  Left Shoulder   Flexion: 161 deg  Abduction: 150 ERP    Right Shoulder   Flexion: 165  Abduction: 151 deg      Poor postural awareness with forward/rounded shoulders    Therapeutic Exercises (CPT 49430):     1. Nustepper, x5 min, cardiovascular warm up     2. Pt education, re: posture education, reviewed    3. Review of hep    8. Scaption with 2# dumbbells, 2x7, fatigue with visible muscle juddering R>L    9. Dumbbell fly's, 3# 3x15 in supine, knees flexed for neutral spine    17. SL shoulder abduction+scapular setting, 15x ea>15x L with 3# dumbbell, difficulty maintaining R shoulder set at 100-120 deg but improvement with increased reps; L shoulder  difficulties at 100 deg with impaired upward rotation of scapula L; repeated tactile cuing at shoulder and VC's to maintain SL and avoid posterior trunk rotation    18. SA punches supine, 5#dumbbells, 2x10 ea, reviewed; VC's to straighten arms and avoid elbow flexion     19. Counter push ups, 2x10, muscle juddering but able to complete both with full AROM    20. UPOC: 10/29/21; eval on 8/30/21    Therapeutic Treatments and Modalities:     1. Manual Therapy (CPT 85596), see below, x10 min     Therapeutic Treatment and Modalities Summary: L scapular upward rotation mobs gr III with STM to levator scap and rhomboids R    Time-based treatments/modalities:    Physical  Therapy Timed Treatment Charges  Manual therapy minutes (CPT 98141): 10 minutes  Therapeutic exercise minutes (CPT 98938): 31 minutes      Pain rating (1-10) before treatment:  0  Pain rating (1-10) after treatment:  0        ASSESSMENT:   Response to treatment:   Pt continues to demonstrate excellent GH ROM increases in elevation with reported positive impact on ADL's including decreased pain with lifting plates. Does continue to present with strength limitations and this will be current emphasis in therapy sessions. Will follow up with pt next week to discuss continued functional limitations and establish new ex accordingly.      PLAN/RECOMMENDATIONS:   Plan for treatment: therapy treatment to continue next visit.  Planned interventions for next visit: continue with current treatment. Continue gentle strengthening as tolerated. Review SL shoulder abd+scapular setting for accuracy

## 2021-10-01 ENCOUNTER — OFFICE VISIT (OUTPATIENT)
Dept: VASCULAR LAB | Facility: MEDICAL CENTER | Age: 81
End: 2021-10-01
Attending: FAMILY MEDICINE
Payer: MEDICARE

## 2021-10-01 VITALS
HEIGHT: 70 IN | BODY MASS INDEX: 25.62 KG/M2 | DIASTOLIC BLOOD PRESSURE: 69 MMHG | WEIGHT: 179 LBS | SYSTOLIC BLOOD PRESSURE: 147 MMHG | HEART RATE: 68 BPM

## 2021-10-01 DIAGNOSIS — M79.89 LEG SWELLING: ICD-10-CM

## 2021-10-01 DIAGNOSIS — Z79.01 CHRONIC ANTICOAGULATION: ICD-10-CM

## 2021-10-01 DIAGNOSIS — I87.2 CHRONIC VENOUS INSUFFICIENCY: ICD-10-CM

## 2021-10-01 PROCEDURE — 99214 OFFICE O/P EST MOD 30 MIN: CPT | Performed by: FAMILY MEDICINE

## 2021-10-01 PROCEDURE — 99212 OFFICE O/P EST SF 10 MIN: CPT

## 2021-10-01 ASSESSMENT — ENCOUNTER SYMPTOMS
COUGH: 0
NAUSEA: 0
HEADACHES: 0
FEVER: 0
SEIZURES: 0
WEAKNESS: 0
WHEEZING: 0
ABDOMINAL PAIN: 0
TREMORS: 0
VOMITING: 0
HEMOPTYSIS: 0
DIZZINESS: 0
PALPITATIONS: 0
CHILLS: 0
BRUISES/BLEEDS EASILY: 0
SHORTNESS OF BREATH: 0
CLAUDICATION: 0
FOCAL WEAKNESS: 0
MYALGIAS: 0
DIARRHEA: 0

## 2021-10-01 ASSESSMENT — FIBROSIS 4 INDEX: FIB4 SCORE: 1.25

## 2021-10-01 NOTE — PROGRESS NOTES
FOLLOW-UP VASCULAR ANTICOAGULATION VISIT  Subjective:   Laurent Infante is a  male who presents today 10/01/21 for   No chief complaint on file.  Initially referred by Roxy Ramon M.D. for length of therapy (LOT) determination and management of anticoagulation in context of acute venothromboembolic disease    HPI:     VTE disease / Anticoagulation:   Current symptoms:  Recent worsening swelling at bilat ankles w/o pain.  [place]  Contacted our office for ? Of DVT and had negative D-dimer.  Has improved with elevation with ankle pumps.  Has ongoing reduced Na diet.   Current antithrombotic agent: ASA 81mg daily   Complications: none   Date of initiation of anticoagulation: 1/8/21   Adherence: reports complete, no missed doses     Pertinent VTE pmhx:   Date of Diagnosis: 1/8/21  Type of Venous thromboembolic disease (VTE): acute LLE SVT and DVT at SPJ into CFV  Type of imaging: duplex   Preceding/presenting symptoms: prior to date of dx had LLE pain with swelling that she awoke with acutely from thigh to distal leg.  ROM limited at knee from swelling.  Seen by PCP and had duplex   Antithrombotic therapy at time of VTE event: no  VTE tx course: Eliquis 10mg BID x 7d, now eliquis 5mg BID completed 7/2021, transitioned to ASA   Any personal VTE hx? Yes, Details: acute/chronic LLE SVT, varicosities BLE - long-standing   Any family VTE hx? No   UNPROVOKED VS PROVOKED:   Recent surgery ? No  Recent trauma ? No  Smoker?  reports that he has never smoked. He has never used smokeless tobacco.    Extended travel? No  Other periods of immobility? No  Other risk factors for VTE disease:  no  MEN:  Colorectal CA screening:yes              Prostate CA screening: most recently 26, reports many years elevated, seeing urology (Dr. Hagen) - pending f/u and ongoing care.     Hx of Testosterone therapy: no  Hypercoaguability work-up completed?  no (see assessment for details)    Current  Outpatient Medications:   •  fluticasone, 1 Spray, Nasal, DAILY, Taking  •  aspirin, 81 mg, Oral, DAILY, Taking  •  divalproex ER, TAKE 2 TABLETS (1,000MG) ORALLY EVERY DAY FOR 30 DAYS, Taking  •  divalproex ER, TAKE 2 TABLETS (1,000MG) ORALLY EVERY DAY FOR 30 DAYS, Taking  •  tolterodine ER, tolterodine ER 4 mg capsule,extended release 24 hr  TAKE 1 CAPSULE BY MOUTH EVERY DAY, Taking  •  Tylenol, Tylenol 325 mg capsule  Take by oral route., Taking  •  mupirocin, APPLY TO AFFECTED AREA 3 TIMES A DAY FOR 7 DAYS, Taking     No Known Allergies     Social History     Tobacco Use   • Smoking status: Never Smoker   • Smokeless tobacco: Never Used   Vaping Use   • Vaping Use: Never used   Substance Use Topics   • Alcohol use: Yes     Alcohol/week: 0.6 oz     Types: 1 Glasses of wine per week     Comment: at night   • Drug use: Never       DIET AND EXERCISE:  Weight Change:stable   BMI Readings from Last 5 Encounters:   10/01/21 25.68 kg/m²   08/10/21 26.60 kg/m²   07/16/21 26.11 kg/m²   07/06/21 25.83 kg/m²   06/09/21 25.91 kg/m²     Diet: common adult  Exercise: moderate regular exercise program     Review of Systems   Constitutional: Negative for chills, fever and malaise/fatigue.   Respiratory: Negative for cough, hemoptysis, shortness of breath and wheezing.    Cardiovascular: Positive for leg swelling (bilat ankles). Negative for chest pain, palpitations and claudication.   Gastrointestinal: Negative for abdominal pain, diarrhea, nausea and vomiting.   Musculoskeletal: Negative for joint pain and myalgias.   Skin: Negative for itching and rash.   Neurological: Negative for dizziness, tremors, focal weakness, seizures, weakness and headaches.   Endo/Heme/Allergies: Does not bruise/bleed easily.        Objective:     Vitals:    10/01/21 1502 10/01/21 1505   BP: 135/65 147/69   BP Location: Left arm Left arm   Patient Position: Sitting Sitting   BP Cuff Size: Adult Adult   Pulse: 69 68   Weight: 81.2 kg (179 lb)   "  Height: 1.778 m (5' 10\")       BP Readings from Last 5 Encounters:   10/01/21 147/69   08/10/21 126/64   07/16/21 121/68   07/06/21 122/60   06/09/21 130/68      Body mass index is 25.68 kg/m².  Physical Exam  Vitals reviewed.   Constitutional:       Appearance: Normal appearance.   HENT:      Head: Normocephalic and atraumatic.      Nose: Nose normal.      Mouth/Throat:      Mouth: Mucous membranes are moist.      Pharynx: Oropharynx is clear.   Eyes:      Extraocular Movements: Extraocular movements intact.      Conjunctiva/sclera: Conjunctivae normal.   Cardiovascular:      Rate and Rhythm: Normal rate and regular rhythm.      Pulses: Normal pulses.           Carotid pulses are 2+ on the right side and 2+ on the left side.       Radial pulses are 2+ on the right side and 2+ on the left side.        Dorsalis pedis pulses are 2+ on the right side and 2+ on the left side.        Posterior tibial pulses are 2+ on the right side and 2+ on the left side.      Heart sounds: Normal heart sounds.      Comments:    Spider telangectasia:       RLE: scattered    LLE: scattered   Varicosities:           RLE: reticular pretibial, varicose medial calf       LLE: diffuse lower limb   Corona phlebectatica:      RLE:  mild       LLE:  mild  Cording:         RLE:  None     LLE: None     Pulmonary:      Effort: Pulmonary effort is normal.      Breath sounds: Normal breath sounds.   Abdominal:      General: Abdomen is flat. Bowel sounds are normal.      Palpations: Abdomen is soft.   Musculoskeletal:      Cervical back: Normal range of motion and neck supple.      Right lower leg: No edema.      Left lower leg: No edema.   Skin:     General: Skin is warm and dry.      Capillary Refill: Capillary refill takes less than 2 seconds.      Coloration: Skin is not cyanotic or pale.      Findings: No lesion or petechiae.          Neurological:      General: No focal deficit present.      Mental Status: He is alert and oriented to person, " place, and time. Mental status is at baseline.   Psychiatric:         Mood and Affect: Mood normal.         Behavior: Behavior normal.         Lab Results   Component Value Date    CHOLSTRLTOT 183 06/12/2021     (H) 06/12/2021    HDL 56 06/12/2021    TRIGLYCERIDE 75 06/12/2021           Lab Results   Component Value Date    SODIUM 140 06/12/2021    POTASSIUM 5.4 06/12/2021    CHLORIDE 107 06/12/2021    CO2 26 06/12/2021    GLUCOSE 117 (H) 06/12/2021    BUN 13 06/12/2021    CREATININE 0.89 06/12/2021    IFAFRICA >60 06/12/2021    IFNOTAFR >60 06/12/2021        Lab Results   Component Value Date    WBC 7.5 05/10/2021    RBC 4.78 05/10/2021    HEMOGLOBIN 14.7 05/10/2021    HEMATOCRIT 43.6 05/10/2021    MCV 91.2 05/10/2021    MCH 30.8 05/10/2021    MCHC 33.7 05/10/2021    MPV 9.9 05/10/2021      Ref. Range 1/7/2021 15:46 8/23/2021 13:22 9/24/2021 13:50   D-Dimer Screen Latest Ref Range: 0.00 - 0.50 ug/mL (FEU) 1.42 (H) 0.37 0.33      Ref. Range 12/16/2020 15:56 1/28/2021 15:20 4/27/2021 16:15   Prostatic Specific Antigen Tot Latest Ref Range: 0.00 - 4.00 ng/mL 17.30 (H) 25.10 (H) 19.90 (H)     VASCULAR IMAGING:     Last EKG:   No results found for this or any previous visit.     BLE venous 1/8/21   1. Acute DVT in the LEFT saphenofemoral junction, extending into the common    femoral vein.   2. Acute SVT from the LEFT ankle to thigh.   3. No RIGHT DVT or SVT.    TANO/art duplex 4/2021    1. No evidence of hemodynamically significant stenosis in the Left lower    extremity arteries visualized on this exam.     LLE venous 4/29/21    1.  Subacute to chronic appearing superficial thrombus in the the LEFT    greater saphenous vein approximately 3 mm from the CFV junction.   2.  No evidence of Left lower extremity DVT.     Hip xray 4/29/21   1.  No radiographic evidence of acute traumatic injury.   2.  Findings are consistent with moderate osteoarthritis.     Medical Decision Making:  Today's Assessment / Status / Plan:  "    1. Chronic venous insufficiency     2. Chronic anticoagulation     3. Leg swelling       PATIENT TYPE: Primary Prevention    Etiology of Established CVD if Present:     1) acute now chronic LLE DVT, SVT, 1/2021     2) postthrombotic syndrome, BLE  CEAP classification: C4aS / Ep,s / As,d / Pr,o  - reviewed anatomy, pathophys and gave educ handouts regarding CVI, common s/s, possible complications, and tx options   - neg D-dimer, no indications of new DVT on exam  Plan:  - start/continue knee-high compression socks, 20-30mmHg, as much as tolerated, reviewed compressionstorePowerVision and sizing processes   - increase walking, avoid prolonged standing   - elevated legs while sitting and sleeping above heart level  - emphasized need for reduction of central adiposity to reduce extrinsic compression of the low-pressure IVC system to improve venous return and reduce distal venous pressure in legs - reviewed dietary changes and monitor weight and waist circumference  - side sleeping with body pillow may improve lymphatic and venous return by reducing  extrinsic compression on IVC during sleep  - reduce sodium (\"salt\") in diet to less than 2,000mg daily   - continue daily moisturizing lotion (such as Gold Bond Diabetic Foot Cream)  Meds:   - consider horse chestnut seed extract 300mg 2 times daily for 3 to 6 months to determine if helps with venous health - over the counter at most pharmacies or online     ANTITHROMBOTIC THERAPY:  Anti-Platelet/Anti-Coagulant Tx recommended: yes  Indication: acute LLE DVT, SVT, 1/2021   Date of initiation: 1/8/21  HAS-BLED bleeding risk calc ([email or web address]): 1 pt, 3.4%, low risk  Thrombophilia/hypercoag evaluation:  not recommended  Factors to consider for indefinite OAC: Unprovoked VTE event and Male sex   Last CBC, BMP: 2019  Expected duration: completed 6mo therapy (7/2021), reviewed options for extension 6-12mo with 1/2 dose DOAC vs ASA, and we had opted to start ASA only at last visit   - " Elevated PSA and any dx of prostate CA will also factor into this, so will need further input from Urology, as active malignancy and worsening stage will further increase risk of recurrent VTE if off OAC.   Antithrombotic therapy plan:  - continue ASA 162mg daily indefinitely   - no indications for further imaging at this time   - counseled on signs and symptoms of acute VTE that require seeking prompt attention in the ED to include shortness of breath, chest pain, pain with deep inhalation, acute leg swelling and/or pain in calf or leg   - elevate legs as much as possible, use compression stockings/socks if directed by your provider  - Avoid hormonal therapies including estrogen or testosterone-containing meds, or raloxifene or tamoxifene (commonly used for osteoporosis)  - Avoid sedentary periods  - continue complete avoidance of tobacco products  - if having any invasive procedure,please make sure the doctor knows of your history of blood clots and current anticoagulation status    LIPID MANAGEMENT:   Qualifies for Statin Therapy Based on 2018 ACC/AHA Guidelines: no, Outside age range for consideration of statin therapy and primary prevention per ACC/AHA guidelines.   10-yr ASCVD risk score: N/A  Major ASCVD events: None  High-risk conditions: >64yr old   Currently on Statin: No  Treatment goals: LDL-C <100 (consider non-HDL-C <130, apoB <90)  At goal? N/A  Plan:   - reinforced ongoing TLC measures as noted   - monitor labs, deferred to PCP   Meds: none at this time      BLOOD PRESSURE MANAGEMENT:  ACC/AHA (2017) goal <130/80  Home BP at goal: yes  Office BP at goal:  Mild high SBP, reasonable for goal <140/80   Plan:   - continue healthy diet, activity, weight mgmt   Monitoring:   - routine clinic-based BP measurements at least once annually   Medications: no meds indicated at this time         GLYCEMIC STATUS:  Normal    LIFESTYLE RECOMMENDATIONS:     Smoking:  reports that he has never smoked. He has never  used smokeless tobacco.   - continued complete avoidance of all tobacco products     Physical Activity: continue healthy activity to improve CV fitness, see care instructions for additional details     Weight Management and Nutrition: Dietary plan was discussed with patient at this visit including DASH, low sodium and/or as outlined in care instructions     OTHER:    # elevated PSA, increased velocity   Though prostate CA is a lower risk malignancy in its association with VTE, there remains at least a 4-fold increased risk for recurrent VTE with increasing risk with age, stage, mets.   - defer surveillance of PSA and dx/tx options to urology     # progressive L gluteal/hip pain , resolved   - neg TANO/art duplex, neg repeat venous duplex, hip xrays with moderate OA   - ongoing PT    Instructed to follow-up with PCP for remainder of adult medical needs: yes  We will partner with other providers in the management of established vascular disease and cardiometabolic risk factors.    Studies to Be Obtained:  None   Labs to Be Obtained: as noted above     Follow up in:   vasc med prn, ongoing care with PCP     Damian Barrett M.D.  [facility]   [facility]   [phone]

## 2021-10-07 ENCOUNTER — PHYSICAL THERAPY (OUTPATIENT)
Dept: PHYSICAL THERAPY | Facility: MEDICAL CENTER | Age: 81
End: 2021-10-07
Attending: PHYSICAL MEDICINE & REHABILITATION
Payer: MEDICARE

## 2021-10-07 DIAGNOSIS — Z98.890 HISTORY OF REPAIR OF LEFT ROTATOR CUFF: ICD-10-CM

## 2021-10-07 DIAGNOSIS — M25.512 LEFT SHOULDER PAIN, UNSPECIFIED CHRONICITY: ICD-10-CM

## 2021-10-07 PROCEDURE — 97110 THERAPEUTIC EXERCISES: CPT

## 2021-10-07 NOTE — OP THERAPY DAILY TREATMENT
Outpatient Physical Therapy  DAILY TREATMENT     [facility]  [address]  [place] [address]  Phone:  [phone]  Fax:  [phone]    Date: 10/07/2021    Patient: Christiano Infante  YOB: 1940  MRN: [id number]     Time Calculation    Start time: 1602  Stop time: 1640 Time Calculation (min): 38 minutes         Chief Complaint: Shoulder Problem    Visit #: 7    SUBJECTIVE:  The pain is infrequent; I try to be more careful when I'm lifting plates and doing things at home.       OBJECTIVE:  Current objective measures:       Active Range of Motion post treatment (tested seated):  Left Shoulder   Flexion: 161 deg  Abduction: 150 ERP    Right Shoulder   Flexion: 165  Abduction: 151 deg      Poor postural awareness with forward/rounded shoulders    Therapeutic Exercises (CPT 50706):     1. Nustepper, x5 min, cardiovascular warm up     2. Pt education, re: posture education, reviewed    3. Review of hep    8. Scaption with 2# dumbbells, 2x7, fatigue with visible muscle juddering R>L    9. Dumbbell fly's, 3# 3x15 in supine, knees flexed for neutral spine    10. Quadruped alt GH reaching, 10x ea    11. Quadruped alt LE kicks, 10x ea    14. D2 pattern>lawnmower, 2x10ea , modified to lawnmower due to [place] hiking R with pain complaints; mild ant shoulder soreness following lawnmower on R     15. Robbers, 10x ea, difficulty with ER on RUE    16. Body blade RC co-contraction, scapular plane 20 sec at 45 deg elevation    17. SL shoulder abduction+scapular setting, 15x ea>15x L with 3# dumbbell, difficulty maintaining R shoulder set at 100-120 deg but improvement with increased reps; L shoulder  difficulties at 100 deg with impaired upward rotation of scapula L; repeated tactile cuing at shoulder and VC's to maintain SL and avoid posterior trunk rotation    18. SA punches supine, 5# then 8#dumbbells 12x ea , reviewed; progressed from 5>8#    19. Counter push ups,  "2x10, muscle juddering but able to complete both with full AROM; painless per pt-reviewed    20. UPOC: 10/29/21; eval on 8/30/21      Therapeutic Exercise Summary:         Time-based treatments/modalities:    Physical Therapy Timed Treatment Charges  Therapeutic exercise minutes (CPT 36646): 38 minutes      Pain rating (1-10) before treatment:  0  Pain rating (1-10) after treatment:  0        ASSESSMENT:   Response to treatment:   Pt maintaining gains in shoulder ROM with improving strength (reporting hep is \"getting easier\"). Will progress indep hep at subsequent session. Pt demonstrating UT dominant pattern with elevation with weighted exercises, therefore modified to artur to reduce UT activation. Pt demonstrating difficulty with ER on RUE with some difficulty completing robbers. Pt reporting will leave on vacation; will return in one week and will reassess continued complaints and functional limitations.      PLAN/RECOMMENDATIONS:   Plan for treatment: therapy treatment to continue next visit.  Planned interventions for next visit: continue with current treatment.   will reassess continued complaints and functional limitations.   Continue gentle strengthening as tolerated. Reassess shoulder AROM and assess if Review SL shoulder abd+scapular setting for accuracy needed     "

## 2021-10-18 ENCOUNTER — PHYSICAL THERAPY (OUTPATIENT)
Dept: PHYSICAL THERAPY | Facility: MEDICAL CENTER | Age: 81
End: 2021-10-18
Attending: PHYSICAL MEDICINE & REHABILITATION
Payer: MEDICARE

## 2021-10-18 DIAGNOSIS — M25.512 LEFT SHOULDER PAIN, UNSPECIFIED CHRONICITY: ICD-10-CM

## 2021-10-18 DIAGNOSIS — Z98.890 HISTORY OF REPAIR OF LEFT ROTATOR CUFF: ICD-10-CM

## 2021-10-18 PROCEDURE — 97110 THERAPEUTIC EXERCISES: CPT

## 2021-10-18 NOTE — OP THERAPY DAILY TREATMENT
"  Outpatient Physical Therapy  DAILY TREATMENT     [facility]  [address]  [place] [address]  Phone:  [phone]  Fax:  [phone]    Date: 10/18/2021    Patient: Christiano Infante  YOB: 1940  MRN: [id number]     Time Calculation    Start time: 1516  Stop time: 1554 Time Calculation (min): 38 minutes         Chief Complaint: Shoulder Problem    Visit #: 8    SUBJECTIVE:  [place] I fell on my coccyx. I've been spending a lot of time on the heating pad; sometimes it's better and sometimes it's through the roof. I'm not planning to see my doctor about this. I do not know why I fell.     Denies numbness/tingling, changes in bowel and bladder.   No pain with sitting, straining/using rest room.  Endorses improved pain with sitting. Standing and walking aggravates symptoms.      OBJECTIVE:  Current objective measures:     Therapeutic Exercises (CPT 75659):     1. Goals assessment    2. QuickDash    3. Review of hep    4. Objective measures for shoulder    5. Screen for \"coccyx pain\"    6. Review of indep vestibular hep, pt reporting \"going well\"    20. UPOC: 10/29/21; eval on 8/30/21      Therapeutic Exercise Summary:         Time-based treatments/modalities:    Physical Therapy Timed Treatment Charges  Therapeutic exercise minutes (CPT 02401): 38 minutes      Pain rating (1-10) before treatment:  0  Pain rating (1-10) after treatment:  0      ASSESSMENT:   Response to treatment:   See DC note.    PLAN/RECOMMENDATIONS:   Plan for treatment: See DC plan.     "

## 2021-10-19 ENCOUNTER — HOSPITAL ENCOUNTER (OUTPATIENT)
Dept: RADIOLOGY | Facility: MEDICAL CENTER | Age: 81
End: 2021-10-19
Attending: FAMILY MEDICINE
Payer: MEDICARE

## 2021-10-19 DIAGNOSIS — M54.50 ACUTE LOW BACK PAIN, UNSPECIFIED BACK PAIN LATERALITY, UNSPECIFIED WHETHER SCIATICA PRESENT: ICD-10-CM

## 2021-10-19 PROCEDURE — 72100 X-RAY EXAM L-S SPINE 2/3 VWS: CPT

## 2021-10-19 NOTE — OP THERAPY DISCHARGE SUMMARY
"  Outpatient Physical Therapy  DISCHARGE SUMMARY NOTE      [facility]  [address]  [place] [address]  Phone:  [phone]  Fax:  [phone]    Date of Visit: 10/18/2021    Patient: Christiano Infante  YOB: 1940  MRN: [id number]     Referring Provider: Braxton Lazo M.D.  [address]  [address]  [place] [address]   Referring Diagnosis Other specified postprocedural states [Z98.890];Pain in left shoulder [M25.512]         Functional Assessment Used  Quickdash General Total Score: 20.45     Your patient is being discharged from Physical Therapy with the following comments:   · Goals met  · Pt requesting to return to PT for balance    Comments:   Pt has been seen for 8 visits of skilled physical therapy and demonstrating significant improvements in range of motion, reported improvements in ADL's such as lifting plates, \"noticeably getting stronger,\" improved sleep hygiene with 65% improved shoulder pain, with overall decreased frequency of pain overall. Pt reporting continued difficulties with reaching into cabinets,\"donning compression socks-new complaint. Does demonstrate limitations in R GH ER ROM and strength, suspect possible RC tear.     Pt reporting he is ready to DC from PT for shoulder complaints but demonstrates interest in returning to PT for balance. Pt reporting [place] the [place] and fell on coccyx; no dizziness or symptomatic at fall per pt report. He reports pain has improved and is sometimes “okay” and other times “sends him through the roof.” He reports he was not planning on addressing with provider and has not been seen by UC at this time. Upon screening, presenting with increased sensitivity and pain at L5 SP. Pertinent negatives include: Denies numbness/tingling, changes in bowel and bladder. No pain with sitting, straining/using rest room. Endorses improved pain with sitting. Standing and walking " aggravates symptoms. Due to concern for compression fracture based on pt’s age and trauma, discussed following up with UC or PCP for imaging to rule out.    Objective:  Active Range of Motion post treatment (tested seated):  Left Shoulder   Flexion: 161 deg  Abduction: 150 ERP    Right Shoulder   Flexion: 165  Abduction: 151 deg      Poor postural awareness with forward/rounded shoulders    TTP L5 SP (reproductive of pain) -painful with tapping and pressure     Goals:  Goals:   Short Term Goals:   1. Pt will be independent with written HEP. (met)  2. Pt will be indep with scapular setting. (met)     Short term goal time span:  2-4 weeks      Long Term Goals:    1. Pt will be independent with written HEP. (met)  2. Pt will have a sig improvement in QuickDash score >/= IAN/MCID (eval: 20.45 ) (met)  3. Pt will demonstrate correct scapular setting with reaching consistently. (  4. Pt will demonstrate 20 deg or greater improvement in shoulder AROM B in order to complete ADL's. (met)  5. Pt will report 50% or greater improvements in sleep hygiene. (met; 65% improved per pt)     Long term goal time span:  6-8 weeks      Recommendations:  Pt has satisfied above listed goals and is ready to DC today with independent hep. All questions answered and verbally reviewed independent hep with pt. Expressed to pt that he can return to skilled physical therapy if condition should change or worsen in future.    Due to concern for compression fracture based on pt’s age and trauma (see details above), discussed following up with UC or PCP for imaging to rule out.    Matthias Regalado, PT    Date: 10/18/2021

## 2021-10-21 ENCOUNTER — OFFICE VISIT (OUTPATIENT)
Dept: MEDICAL GROUP | Facility: MEDICAL CENTER | Age: 81
End: 2021-10-21
Payer: MEDICARE

## 2021-10-21 ENCOUNTER — PATIENT MESSAGE (OUTPATIENT)
Dept: MEDICAL GROUP | Facility: MEDICAL CENTER | Age: 81
End: 2021-10-21

## 2021-10-21 VITALS
HEIGHT: 70 IN | WEIGHT: 180 LBS | SYSTOLIC BLOOD PRESSURE: 110 MMHG | OXYGEN SATURATION: 98 % | BODY MASS INDEX: 25.77 KG/M2 | HEART RATE: 62 BPM | DIASTOLIC BLOOD PRESSURE: 64 MMHG | TEMPERATURE: 97.5 F

## 2021-10-21 DIAGNOSIS — R26.89 IMBALANCE: ICD-10-CM

## 2021-10-21 DIAGNOSIS — Z23 NEED FOR VACCINATION: ICD-10-CM

## 2021-10-21 DIAGNOSIS — M54.50 ACUTE BILATERAL LOW BACK PAIN WITHOUT SCIATICA: ICD-10-CM

## 2021-10-21 DIAGNOSIS — M48.061 SPINAL STENOSIS OF LUMBAR REGION, UNSPECIFIED WHETHER NEUROGENIC CLAUDICATION PRESENT: ICD-10-CM

## 2021-10-21 DIAGNOSIS — R05.8 DRY COUGH: ICD-10-CM

## 2021-10-21 PROCEDURE — G0008 ADMIN INFLUENZA VIRUS VAC: HCPCS | Performed by: FAMILY MEDICINE

## 2021-10-21 PROCEDURE — 99214 OFFICE O/P EST MOD 30 MIN: CPT | Mod: 25 | Performed by: FAMILY MEDICINE

## 2021-10-21 PROCEDURE — 90662 IIV NO PRSV INCREASED AG IM: CPT | Performed by: FAMILY MEDICINE

## 2021-10-21 RX ORDER — OMEPRAZOLE 20 MG/1
20 CAPSULE, DELAYED RELEASE ORAL DAILY
Qty: 30 CAPSULE | Refills: 5 | Status: SHIPPED | OUTPATIENT
Start: 2021-10-21

## 2021-10-21 ASSESSMENT — FIBROSIS 4 INDEX: FIB4 SCORE: 1.25

## 2021-10-21 NOTE — ASSESSMENT & PLAN NOTE
Chronic problem. Patient had resolution with omeprazole.   Has chronic runny nose int he mronig  Using flonase and the nose improves after that and throughout the day.

## 2021-10-21 NOTE — ASSESSMENT & PLAN NOTE
New problem.  Patient had a fall and was having significant low back pain.  He is currently in physical therapy.  His physical therapist was concerned about a compression fracture.  X-ray was ordered, reviewed today of the lumbar spine.  No compression fracture noted.  Had MRI a few months ago.  Patient reports pain is improving already.  Denies any neurological symptoms.

## 2021-10-21 NOTE — PROGRESS NOTES
Subjective:     CC: Diagnoses of Need for vaccination, Spinal stenosis of lumbar region, unspecified whether neurogenic claudication present, Dry cough, and Acute bilateral low back pain without sciatica were pertinent to this visit.    HPI: Patient is a 81 y.o. male established patient who presents today review x-ray results and discuss dry cough.    Acute bilateral low back pain without sciatica  New problem.  Patient had a fall and was having significant low back pain.  He is currently in physical therapy.  His physical therapist was concerned about a compression fracture.  X-ray was ordered, reviewed today of the lumbar spine.  No compression fracture noted.  Had MRI a few months ago.  Patient reports pain is improving already.  Denies any neurological symptoms.    Dry cough  Chronic problem. Patient had near resolution with omeprazole.   Has chronic runny nose in the morning  Using flonase and the nose improves after that and throughout the day.   Never productive.  No shortness of breath.        Past Medical History:   Diagnosis Date   • Acute deep vein thrombosis (DVT) of left lower extremity (HCC) 01/2021   • Bipolar 1 disorder (HCC)    • Depression        Social History     Tobacco Use   • Smoking status: Never Smoker   • Smokeless tobacco: Never Used   Vaping Use   • Vaping Use: Never used   Substance Use Topics   • Alcohol use: Yes     Alcohol/week: 0.6 oz     Types: 1 Glasses of wine per week     Comment: at night   • Drug use: Never       Current Outpatient Medications Ordered in Epic   Medication Sig Dispense Refill   • omeprazole (PRILOSEC) 20 MG delayed-release capsule Take 1 Capsule by mouth every day. 30 Capsule 5   • fluticasone (FLONASE) 50 MCG/ACT nasal spray Administer 1 Spray into affected nostril(S) every day. 48 mL 0   • aspirin 81 MG EC tablet Take 1 tablet by mouth every day. 30 tablet    • divalproex ER (DEPAKOTE ER) 500 MG TABLET SR 24 HR TAKE 2 TABLETS (1,000MG) ORALLY EVERY DAY FOR 30  "DAYS     • divalproex ER (DEPAKOTE ER) 500 MG TABLET SR 24 HR TAKE 2 TABLETS (1,000MG) ORALLY EVERY DAY FOR 30 DAYS     • tolterodine ER (DETROL LA) 4 MG CAPSULE SR 24 HR tolterodine ER 4 mg capsule,extended release 24 hr   TAKE 1 CAPSULE BY MOUTH EVERY DAY     • mupirocin (BACTROBAN) 2 % Ointment APPLY TO AFFECTED AREA 3 TIMES A DAY FOR 7 DAYS     • Acetaminophen (TYLENOL) 325 MG Cap Tylenol 325 mg capsule   Take by oral route. (Patient not taking: Reported on 10/21/2021)       No current [facility]-ordered facility-administered medications on file.       Allergies:  Patient has no known allergies.    Health Maintenance: Completed    ROS:  Gen: no fevers/chill, no changes in weight  Eyes: no changes in vision  ENT: no sore throat, no hearing loss, no bloody nose  Pulm: no sob, no cough  CV: no chest pain, no palpitations  GI: no nausea/vomiting, no diarrhea  : no dysuria  MSk: no myalgias  Skin: no rash  Neuro: no headaches, no numbness/tingling  Heme/Lymph: no easy bruising      Objective:       Exam:  /64 (BP Location: Left arm, Patient Position: Sitting, BP Cuff Size: Adult long)   Pulse 62   Temp 36.4 °C (97.5 °F) (Temporal)   Ht 1.778 m (5' 10\")   Wt 81.6 kg (180 lb)   SpO2 98%   BMI 25.83 kg/m²  Body mass index is 25.83 kg/m².    General: Normal appearing. No distress.  HEAD: NCAT  EYES: conjunctiva clear, lids without ptosis, pupils equal and reactive to light  EARS: ears normal shape and contour.  MOUTH: normal dentition   Neck:  Normal ROM  Pulmonary: Normal effort. Normal respiratory rate.  Cardiovascular: Well perfused. No LE edema  Neurologic: Grossly normal, no focal deficits  Skin: Warm and dry.  No obvious lesions.  Musculoskeletal: Normal gait and station.   Psych: Normal mood and affect. Alert and oriented x3. Judgment and insight is normal.     Labs: 9/24/2021 results reviewed and discussed with the patient, questions answered.    Assessment & Plan:     81 y.o. male with the following - "     1. Need for vaccination  - Influenza Vaccine, High Dose (65+ Only)    2.  Low back pain  New problem.  Patient reports he had a fall, seen by his physical therapist who was concerned about compression fracture.  Reviewed x-ray that was ordered by myself today.  Advised there is no compression fracture.  Pain is already starting to improve.  No neurological symptoms.  Plan to continue PT.    3. Dry cough  Chronic problem, comes and goes.  Had resolved with PPI in the past.  He reports his wife is complaining about the chronic cough being back.  Plan to restart PPI.  Reviewed typical GERD inducing food/drink, handout given.  Plan to follow-up if no improvement.  - omeprazole (PRILOSEC) 20 MG delayed-release capsule; Take 1 Capsule by mouth every day.  Dispense: 30 Capsule; Refill: 5      Return if symptoms worsen or fail to improve.    Please note that this dictation was created using voice recognition software. I have made every reasonable attempt to correct obvious errors, but I expect that there are errors of grammar and possibly content that I did not discover before finalizing the note.

## 2021-10-27 ENCOUNTER — HOSPITAL ENCOUNTER (OUTPATIENT)
Facility: MEDICAL CENTER | Age: 81
End: 2021-10-27
Attending: PHYSICIAN ASSISTANT
Payer: MEDICARE

## 2021-10-27 PROCEDURE — 84153 ASSAY OF PSA TOTAL: CPT

## 2021-10-28 LAB — PSA SERPL-MCNC: 20.5 NG/ML (ref 0–4)

## 2021-11-24 DIAGNOSIS — R05.9 COUGH: ICD-10-CM

## 2021-11-24 NOTE — TELEPHONE ENCOUNTER
Received request via: Pharmacy    Was the patient seen in the last year in this department? Yes    Does the patient have an active prescription (recently filled or refills available) for medication(s) requested? No     Future Appointments       Provider Department Center    1/4/2022 1:30 PM Roxy Ramon M.D. [facility]

## 2021-11-29 RX ORDER — FLUTICASONE PROPIONATE 50 MCG
1 SPRAY, SUSPENSION (ML) NASAL DAILY
Qty: 48 ML | Refills: 0 | Status: SHIPPED | OUTPATIENT
Start: 2021-11-29

## 2022-01-06 ENCOUNTER — ANTICOAGULATION MONITORING (OUTPATIENT)
Dept: MEDICAL GROUP | Facility: PHYSICIAN GROUP | Age: 82
End: 2022-01-06

## 2022-01-06 NOTE — PROGRESS NOTES
Discharged from [facility].       Pt has been transitioned to [facility] by YFN Barrett.  Chani Solis, Clinical Pharmacist, CDE, CACP

## 2022-10-20 ENCOUNTER — TELEPHONE (OUTPATIENT)
Dept: HEALTH INFORMATION MANAGEMENT | Facility: OTHER | Age: 82
End: 2022-10-20

## 2022-10-20 NOTE — TELEPHONE ENCOUNTER
Outcome: Left Message for patient to schedule an AWV     Please transfer to [facility] at [phone] when patient returns call.     Attempt # 1  - [place]

## 2022-11-08 ENCOUNTER — PATIENT MESSAGE (OUTPATIENT)
Dept: HEALTH INFORMATION MANAGEMENT | Facility: OTHER | Age: 82
End: 2022-11-08